# Patient Record
Sex: FEMALE | Race: WHITE | NOT HISPANIC OR LATINO | Employment: FULL TIME | URBAN - METROPOLITAN AREA
[De-identification: names, ages, dates, MRNs, and addresses within clinical notes are randomized per-mention and may not be internally consistent; named-entity substitution may affect disease eponyms.]

---

## 2019-10-06 ENCOUNTER — TRANSFERRED RECORDS (OUTPATIENT)
Dept: HEALTH INFORMATION MANAGEMENT | Facility: CLINIC | Age: 59
End: 2019-10-06

## 2019-10-25 ENCOUNTER — TRANSFERRED RECORDS (OUTPATIENT)
Dept: HEALTH INFORMATION MANAGEMENT | Facility: CLINIC | Age: 59
End: 2019-10-25

## 2019-11-02 ENCOUNTER — TRANSFERRED RECORDS (OUTPATIENT)
Dept: HEALTH INFORMATION MANAGEMENT | Facility: CLINIC | Age: 59
End: 2019-11-02

## 2019-11-07 ENCOUNTER — TRANSFERRED RECORDS (OUTPATIENT)
Dept: HEALTH INFORMATION MANAGEMENT | Facility: CLINIC | Age: 59
End: 2019-11-07

## 2019-11-22 ENCOUNTER — TRANSFERRED RECORDS (OUTPATIENT)
Dept: HEALTH INFORMATION MANAGEMENT | Facility: CLINIC | Age: 59
End: 2019-11-22

## 2019-12-09 ENCOUNTER — TRANSFERRED RECORDS (OUTPATIENT)
Dept: HEALTH INFORMATION MANAGEMENT | Facility: CLINIC | Age: 59
End: 2019-12-09

## 2020-02-03 ENCOUNTER — TRANSFERRED RECORDS (OUTPATIENT)
Dept: HEALTH INFORMATION MANAGEMENT | Facility: CLINIC | Age: 60
End: 2020-02-03

## 2020-06-12 ENCOUNTER — TRANSFERRED RECORDS (OUTPATIENT)
Dept: HEALTH INFORMATION MANAGEMENT | Facility: CLINIC | Age: 60
End: 2020-06-12

## 2020-07-21 ENCOUNTER — TRANSFERRED RECORDS (OUTPATIENT)
Dept: HEALTH INFORMATION MANAGEMENT | Facility: CLINIC | Age: 60
End: 2020-07-21

## 2020-07-23 ENCOUNTER — TRANSFERRED RECORDS (OUTPATIENT)
Dept: HEALTH INFORMATION MANAGEMENT | Facility: CLINIC | Age: 60
End: 2020-07-23

## 2021-02-23 ENCOUNTER — TRANSFERRED RECORDS (OUTPATIENT)
Dept: HEALTH INFORMATION MANAGEMENT | Facility: CLINIC | Age: 61
End: 2021-02-23

## 2021-03-11 ENCOUNTER — TRANSFERRED RECORDS (OUTPATIENT)
Dept: HEALTH INFORMATION MANAGEMENT | Facility: CLINIC | Age: 61
End: 2021-03-11

## 2021-04-01 ENCOUNTER — MEDICAL CORRESPONDENCE (OUTPATIENT)
Dept: HEALTH INFORMATION MANAGEMENT | Facility: CLINIC | Age: 61
End: 2021-04-01

## 2021-04-01 ENCOUNTER — TRANSFERRED RECORDS (OUTPATIENT)
Dept: HEALTH INFORMATION MANAGEMENT | Facility: CLINIC | Age: 61
End: 2021-04-01

## 2021-04-08 ENCOUNTER — TRANSCRIBE ORDERS (OUTPATIENT)
Dept: OTHER | Age: 61
End: 2021-04-08

## 2021-04-08 DIAGNOSIS — K51.011 ULCERATIVE PANCOLITIS WITH RECTAL BLEEDING (H): Primary | ICD-10-CM

## 2021-04-09 ENCOUNTER — DOCUMENTATION ONLY (OUTPATIENT)
Dept: SURGERY | Facility: CLINIC | Age: 61
End: 2021-04-09

## 2021-04-09 NOTE — PROGRESS NOTES
Luis Potter 4/9/21 10:10AM   Action Taken CSS faxed to [facility] to obtain recs   FAX: [phone]    Update: Received recs from [facility] - sent to urgent scanning

## 2021-04-23 NOTE — PROGRESS NOTES
Colon and Rectal Surgery Clinic Note    RE: Radha Jameson.  : 1960.  JU: 2021.    Reason for visit: Ulcerative colitis looking for second opinion regarding IPAA.    HPI:     Ulcerative colitis refractory to medical management followed by Dr. Jeff Silva at [facility].     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     Underwent total abdominal colectomy and end ileostomy on 10/17/2019    Completion proctectomy on 2020.  No ileal pouch anal anastomosis due to large rectal varices.        She postoperatively developed portal vein thrombosis and was on warfarin but currently is just on daily aspirin.    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 Furthermore, has very little past medical and surgical history apart from her kidney disease, DVTs and ulcerative colitis. She does not have any history of liver disease. No previous anorectal operations. G3, P1, C2.    Medical history:  No past medical history on file.    Surgical history:  No past surgical history on file.    Family history:  No family history on file.    Medications:  Current Outpatient Medications   Medication Sig Dispense Refill     aspirin (ASA) 325 MG tablet Take 325 mg by mouth daily       augmented betamethasone dipropionate (DIPROLENE) 0.05 % external lotion APPLY TO THE AFFECTED AREA OF SCALP TWICE DAILY UNTIL IMPROVED       busPIRone (BUSPAR) 5 MG tablet TAKE 1 TABLET BY MOUTH EVERY MORNING AND 2 TABLETS EVERY EVENING       carvedilol (COREG) 25 MG tablet TAKE 1 TABLET BY MOUTH TWICE DAILY WITH MEALS       dutasteride (AVODART) 0.5 MG capsule TAKE 2 CAPSULES BY MOUTH DAILY       enoxaparin ANTICOAGULANT (LOVENOX) 80 MG/0.8ML syringe        EPINEPHrine (ANY BX GENERIC EQUIV) 0.3 MG/0.3ML injection 2-pack INJECT 0.3 ML INTO THE MUSCLE ONE TIME PRF ALLERGIC REACTION       escitalopram (LEXAPRO) 20 MG tablet Take 20 mg by mouth every morning       finasteride  "(PROSCAR) 5 MG tablet TAKE 1 TABLET BY MOUTH DAILY OR AS DIRECTED       ketoconazole (NIZORAL) 2 % external shampoo APPLY TO AFFECTED AREAS ON SCALP ONCE EVERY OTHER WEEK AND THEN WASH OFF       magnesium citrate solution Take 296 mLs by mouth once for 1 dose Start at 8 pm night before surgery, unless otherwise stated in \"Getting Ready for Surgery\" instruction 296 mL 0     metroNIDAZOLE (FLAGYL) 500 MG tablet Take 1 tablet (500 mg) by mouth every 6 hours At 8:00 am, 2:00 pm, 8:00 pm the day prior to your surgery with neomycin and zofran. 3 tablet 0     neomycin (MYCIFRADIN) 500 MG tablet Take 2 tablets (1,000 mg) by mouth every 6 hours At 8:00 am, 2:00 pm, 8:00 pm the day prior to your surgery with flagyl and zofran. 6 tablet 0     nortriptyline (PAMELOR) 25 MG capsule TAKE 1 CAPSULE BY MOUTH AT BEDTIME       ondansetron (ZOFRAN) 4 MG tablet Take 1 tablet (4 mg) by mouth every 6 hours At 8:00 am, 2:00 pm, 8:00 pm the day prior to your surgery with neomycin and flagyl. 3 tablet 0     polyethylene glycol (MIRALAX) 17 g packet Take 238 g by mouth See Admin Instructions Starting at 4 pm night prior to surgery. Refer to \"Getting Ready for Surgery\" instructions. 14 packet 0       Allergies:  Allergies   Allergen Reactions     Warfarin Other (See Comments)     Internal bleeding reaction.     Wasp Venom Protein Angioedema and Other (See Comments)     Morphine Itching     Itching in throat per pt     Amlodipine Other (See Comments)       Social history:   Social History     Tobacco Use     Smoking status: Never Smoker     Smokeless tobacco: Never Used   Substance Use Topics     Alcohol use: Yes     Frequency: 2-4 times a month     Marital status: .    Physical Examination:  /84 (BP Location: Left arm, Patient Position: Sitting, Cuff Size: Adult Regular)   Pulse 57   Temp 97.6  F (36.4  C) (Oral)   Ht 5' 9\"   SpO2 100%   General: Well hydrated. No acute distress.  Abdomen: Soft, NT.  Right lower quadrant and " ileostomy viable and functioning.  No inguinal adenopathy palpated.  No abdominal varices.  Perianal external examination:  Perianal skin: intact.  Lesions: No.  Eversion of buttocks: There was not evidence of an anal fissure. Details: N/A.  Skin tags or external hemorrhoids: No.  Digital rectal examination: Was performed.   Sphincter tone: Good.  Palpable lesions: No.  It seems that the rectal cuff measures at least 4-5 cm from the anal verge.  Other: A small  rectocele was appreciated on bearing down.  Bimanual examination: was not performed.    Investigations:  None.    Procedures:  None.    ASSESSMENT  60-year-old female with ulcerative colitis refractory to medical therapy status post total abdominal proctocolectomy with a 2 cm rectal cuff left behind in case ileoanal anastomosis would be attempted in the future.  Unfortunately given the size of her pelvic varices IPAA was not attempted at that time.  We had a long discussion with regards to her comorbidities that would complicate IPAA as well as sequelae such as pouchitis, pouch varices with recurrent bleeding and severe life-threatening bleeding, pouch failure, chronic pain, and the need for further operations because of bowel obstructions, bowel ischemia or bleeding, internal hernia.  This would also put her at a higher risk for short gut syndrome with chronic TPN as well as further episodes of mesenteric ischemia if she develops more blood clots in her abdominal mesentery.  We also discussed that if I was not able to staple from below that we would have to perform a handsewn ileoanal anastomosis which has been associated with significantly worse functional outcomes as well as anastomotic stricture.  She is very frustrated with the fact that she has had an ileostomy for almost 2 years and is severely depressed and on max dose medications to treat this.  She also lived a very active lifestyle and would like to return at least partially to this.  All questions  were answered to her satisfaction.  Risks, benefits, and alternatives of operative treatment were thoroughly discussed with the patient, he/she understands these well and agrees to proceed.    PLAN  1.  Routine stoma cares.  2.  Schedule MR abdomen and pelvis without IV contrast to reassess abdominal pelvic varices.  3.  Schedule labs today to assess liver and kidney function as well as protein levels.  4.  We will need preoperative clearance by hematology and nephrology as well as PAC.  5.  Tentatively schedule robotic IPAA with diverting loop ileostomy and flexible sigmoidoscopy.  Will need PAC and labs.    60 minutes spent on the date of the encounter doing chart review, history and exam, documentation and further activities as noted above.      Uriel Hung M.D., M.Sc.     Division of Colon and Rectal Surgery  [facility]    Referring Provider:  Jeff Silva MD  [facility]  [address]  [address]  [place] [address]     Primary Care Provider:  Hair Rivera    CC:  Sage Palma DO Hunt, Jennifer L, MD

## 2021-04-26 ENCOUNTER — OFFICE VISIT (OUTPATIENT)
Dept: SURGERY | Facility: CLINIC | Age: 61
End: 2021-04-26
Attending: COLON & RECTAL SURGERY
Payer: COMMERCIAL

## 2021-04-26 VITALS
TEMPERATURE: 97.6 F | OXYGEN SATURATION: 100 % | HEART RATE: 57 BPM | DIASTOLIC BLOOD PRESSURE: 84 MMHG | HEIGHT: 69 IN | SYSTOLIC BLOOD PRESSURE: 131 MMHG

## 2021-04-26 DIAGNOSIS — K51.019 ULCERATIVE PANCOLITIS WITH COMPLICATION (H): ICD-10-CM

## 2021-04-26 DIAGNOSIS — K51.019 ULCERATIVE PANCOLITIS WITH COMPLICATION (H): Primary | ICD-10-CM

## 2021-04-26 LAB
ALBUMIN SERPL-MCNC: 3.7 G/DL (ref 3.4–5)
ALP SERPL-CCNC: 66 U/L (ref 40–150)
ALT SERPL W P-5'-P-CCNC: 43 U/L (ref 0–50)
ANION GAP SERPL CALCULATED.3IONS-SCNC: 6 MMOL/L (ref 3–14)
APTT PPP: 25 SEC (ref 22–37)
AST SERPL W P-5'-P-CCNC: 30 U/L (ref 0–45)
BASOPHILS # BLD AUTO: 0 10E9/L (ref 0–0.2)
BASOPHILS NFR BLD AUTO: 0.9 %
BILIRUB SERPL-MCNC: 0.7 MG/DL (ref 0.2–1.3)
BUN SERPL-MCNC: 31 MG/DL (ref 7–30)
CALCIUM SERPL-MCNC: 9.2 MG/DL (ref 8.5–10.1)
CHLORIDE SERPL-SCNC: 106 MMOL/L (ref 94–109)
CO2 SERPL-SCNC: 25 MMOL/L (ref 20–32)
CREAT SERPL-MCNC: 2.64 MG/DL (ref 0.52–1.04)
DIFFERENTIAL METHOD BLD: NORMAL
EOSINOPHIL # BLD AUTO: 0.2 10E9/L (ref 0–0.7)
EOSINOPHIL NFR BLD AUTO: 5.4 %
ERYTHROCYTE [DISTWIDTH] IN BLOOD BY AUTOMATED COUNT: 14.1 % (ref 10–15)
GFR SERPL CREATININE-BSD FRML MDRD: 19 ML/MIN/{1.73_M2}
GLUCOSE SERPL-MCNC: 70 MG/DL (ref 70–99)
HCT VFR BLD AUTO: 44 % (ref 35–47)
HGB BLD-MCNC: 13.9 G/DL (ref 11.7–15.7)
IMM GRANULOCYTES # BLD: 0 10E9/L (ref 0–0.4)
IMM GRANULOCYTES NFR BLD: 0.2 %
INR PPP: 1.21 (ref 0.86–1.14)
LYMPHOCYTES # BLD AUTO: 1.1 10E9/L (ref 0.8–5.3)
LYMPHOCYTES NFR BLD AUTO: 24.5 %
MCH RBC QN AUTO: 31.2 PG (ref 26.5–33)
MCHC RBC AUTO-ENTMCNC: 31.6 G/DL (ref 31.5–36.5)
MCV RBC AUTO: 99 FL (ref 78–100)
MONOCYTES # BLD AUTO: 0.5 10E9/L (ref 0–1.3)
MONOCYTES NFR BLD AUTO: 12.2 %
NEUTROPHILS # BLD AUTO: 2.5 10E9/L (ref 1.6–8.3)
NEUTROPHILS NFR BLD AUTO: 56.8 %
NRBC # BLD AUTO: 0 10*3/UL
NRBC BLD AUTO-RTO: 0 /100
PLATELET # BLD AUTO: 216 10E9/L (ref 150–450)
POTASSIUM SERPL-SCNC: 4.7 MMOL/L (ref 3.4–5.3)
PREALB SERPL IA-MCNC: 35 MG/DL (ref 15–45)
PROT SERPL-MCNC: 7.5 G/DL (ref 6.8–8.8)
RBC # BLD AUTO: 4.46 10E12/L (ref 3.8–5.2)
SODIUM SERPL-SCNC: 138 MMOL/L (ref 133–144)
WBC # BLD AUTO: 4.4 10E9/L (ref 4–11)

## 2021-04-26 PROCEDURE — 80053 COMPREHEN METABOLIC PANEL: CPT | Performed by: PATHOLOGY

## 2021-04-26 PROCEDURE — 85730 THROMBOPLASTIN TIME PARTIAL: CPT | Performed by: PATHOLOGY

## 2021-04-26 PROCEDURE — 85610 PROTHROMBIN TIME: CPT | Performed by: PATHOLOGY

## 2021-04-26 PROCEDURE — 85025 COMPLETE CBC W/AUTO DIFF WBC: CPT | Performed by: PATHOLOGY

## 2021-04-26 PROCEDURE — 99205 OFFICE O/P NEW HI 60 MIN: CPT | Performed by: COLON & RECTAL SURGERY

## 2021-04-26 PROCEDURE — 36415 COLL VENOUS BLD VENIPUNCTURE: CPT | Performed by: PATHOLOGY

## 2021-04-26 PROCEDURE — 84134 ASSAY OF PREALBUMIN: CPT | Performed by: PATHOLOGY

## 2021-04-26 RX ORDER — METRONIDAZOLE 500 MG/1
500 TABLET ORAL EVERY 6 HOURS
Qty: 3 TABLET | Refills: 0 | Status: SHIPPED | OUTPATIENT
Start: 2021-04-26 | End: 2021-04-26

## 2021-04-26 RX ORDER — KETOCONAZOLE 20 MG/ML
SHAMPOO TOPICAL DAILY PRN
COMMUNITY
Start: 2020-12-21

## 2021-04-26 RX ORDER — BETAMETHASONE DIPROPIONATE 0.5 MG/ML
LOTION, AUGMENTED TOPICAL 2 TIMES DAILY
COMMUNITY
Start: 2021-04-03

## 2021-04-26 RX ORDER — NEOMYCIN SULFATE 500 MG/1
1000 TABLET ORAL EVERY 6 HOURS
Qty: 6 TABLET | Refills: 0 | Status: SHIPPED | OUTPATIENT
Start: 2021-04-26 | End: 2021-04-26

## 2021-04-26 RX ORDER — CARVEDILOL 25 MG/1
12.5 TABLET ORAL 2 TIMES DAILY WITH MEALS
COMMUNITY
Start: 2021-02-27

## 2021-04-26 RX ORDER — ASPIRIN 325 MG
325 TABLET ORAL DAILY
COMMUNITY
Start: 2021-04-16 | End: 2021-06-01

## 2021-04-26 RX ORDER — ONDANSETRON 4 MG/1
4 TABLET, FILM COATED ORAL EVERY 6 HOURS
Qty: 3 TABLET | Refills: 0 | Status: SHIPPED | OUTPATIENT
Start: 2021-04-26 | End: 2021-04-26

## 2021-04-26 RX ORDER — FINASTERIDE 5 MG/1
5 TABLET, FILM COATED ORAL EVERY MORNING
COMMUNITY
Start: 2021-03-21 | End: 2022-06-06

## 2021-04-26 RX ORDER — ESCITALOPRAM OXALATE 20 MG/1
20 TABLET ORAL EVERY MORNING
COMMUNITY
Start: 2021-02-06

## 2021-04-26 RX ORDER — POLYETHYLENE GLYCOL 3350 17 G/17G
238 POWDER, FOR SOLUTION ORAL SEE ADMIN INSTRUCTIONS
Qty: 14 PACKET | Refills: 0 | Status: SHIPPED | OUTPATIENT
Start: 2021-04-26 | End: 2021-04-26

## 2021-04-26 RX ORDER — EPINEPHRINE 0.3 MG/.3ML
INJECTION SUBCUTANEOUS
COMMUNITY
Start: 2020-09-22

## 2021-04-26 RX ORDER — DUTASTERIDE 0.5 MG/1
0.5 CAPSULE, LIQUID FILLED ORAL 2 TIMES DAILY
COMMUNITY
Start: 2021-04-04

## 2021-04-26 RX ORDER — NORTRIPTYLINE HCL 25 MG
25 CAPSULE ORAL AT BEDTIME
COMMUNITY
Start: 2021-02-27

## 2021-04-26 RX ORDER — BUSPIRONE HYDROCHLORIDE 5 MG/1
10 TABLET ORAL 2 TIMES DAILY
COMMUNITY
Start: 2021-02-27

## 2021-04-26 SDOH — HEALTH STABILITY: MENTAL HEALTH: HOW OFTEN DO YOU HAVE 6 OR MORE DRINKS ON ONE OCCASION?: NOT ASKED

## 2021-04-26 SDOH — HEALTH STABILITY: MENTAL HEALTH: HOW MANY STANDARD DRINKS CONTAINING ALCOHOL DO YOU HAVE ON A TYPICAL DAY?: NOT ASKED

## 2021-04-26 SDOH — HEALTH STABILITY: MENTAL HEALTH: HOW OFTEN DO YOU HAVE A DRINK CONTAINING ALCOHOL?: 2-4 TIMES A MONTH

## 2021-04-26 ASSESSMENT — PAIN SCALES - GENERAL: PAINLEVEL: NO PAIN (0)

## 2021-04-26 NOTE — PATIENT INSTRUCTIONS
Follow up:    1. Pura will call you to schedule your surgery. If you do not hear from her within three business days please reach out to her.     2. Appointments you will need in prep:  -COVID test  -pre op physical   -blood work   -cleared by hematologist and nephrologist  -CT      3. You will need to do a full bowel prep with antibiotics the day before surgery. We will mail you the instructions.     4. Please switch to 81mg Aspirin     Please call with any questions or concerns regarding your clinic visit today.    It is a pleasure being involved in your health care.    Contacts post-consultation depending on your need:    Radiology Appointments [phone]    Schedule Clinic Appointments [phone] # 1   M-F 7:30 - 5 pm    MILY Foster [phone]    Clinic Fax Number [phone]    Surgery Scheduling [phone]    My Chart is available 24 hours a day and is a secure way to access your records and communicate with your care team.  I strongly recommend signing up if you haven't already done so, if you are comfortable with computers.  If you would like to inquire about this or are having problems with My Chart access, you may call [phone] or go online at [email or web address].  Please allow at least 24 hours for a response and extra time on weekends and Holidays.

## 2021-04-26 NOTE — NURSING NOTE
"Chief Complaint   Patient presents with     New Patient     New consult for ulcerative pancolitis with rectal bleeding       Vitals:    04/26/21 0937   BP: 131/84   BP Location: Left arm   Patient Position: Sitting   Cuff Size: Adult Regular   Pulse: 57   Temp: 97.6  F (36.4  C)   TempSrc: Oral   SpO2: 100%   Height: 5' 9\"       There is no height or weight on file to calculate BMI.         Delores Linares, [facility]    "

## 2021-04-26 NOTE — LETTER
2021       RE: Radha Jameson  [address]  [address]  [place] [address]     Dear Colleague,    Thank you for referring your patient, Radha Jameson, to the [facility] [place] at [facility]. Please see a copy of my visit note below.    Colon and Rectal Surgery Clinic Note    RE: Radha Jameson.  : 1960.  JU: 2021.    Reason for visit: Ulcerative colitis looking for second opinion regarding IPAA.    HPI:     Ulcerative colitis refractory to medical management followed by Dr. Jeff Silva at [facility].     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     Underwent total abdominal colectomy and end ileostomy on 10/17/2019    Completion proctectomy on 2020.  No ileal pouch anal anastomosis due to large rectal varices.        She postoperatively developed portal vein thrombosis and was on warfarin but currently is just on daily aspirin.    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 Furthermore, has very little past medical and surgical history apart from her kidney disease, DVTs and ulcerative colitis. She does not have any history of liver disease. No previous anorectal operations. G3, P1, C2.    Medical history:  No past medical history on file.    Surgical history:  No past surgical history on file.    Family history:  No family history on file.    Medications:  Current Outpatient Medications   Medication Sig Dispense Refill     aspirin (ASA) 325 MG tablet Take 325 mg by mouth daily       augmented betamethasone dipropionate (DIPROLENE) 0.05 % external lotion APPLY TO THE AFFECTED AREA OF SCALP TWICE DAILY UNTIL IMPROVED       busPIRone (BUSPAR) 5 MG tablet TAKE 1 TABLET BY MOUTH EVERY MORNING AND 2 TABLETS EVERY EVENING       carvedilol (COREG) 25 MG tablet TAKE 1 TABLET BY MOUTH TWICE DAILY WITH MEALS       dutasteride  "(AVODART) 0.5 MG capsule TAKE 2 CAPSULES BY MOUTH DAILY       enoxaparin ANTICOAGULANT (LOVENOX) 80 MG/0.8ML syringe        EPINEPHrine (ANY BX GENERIC EQUIV) 0.3 MG/0.3ML injection 2-pack INJECT 0.3 ML INTO THE MUSCLE ONE TIME PRF ALLERGIC REACTION       escitalopram (LEXAPRO) 20 MG tablet Take 20 mg by mouth every morning       finasteride (PROSCAR) 5 MG tablet TAKE 1 TABLET BY MOUTH DAILY OR AS DIRECTED       ketoconazole (NIZORAL) 2 % external shampoo APPLY TO AFFECTED AREAS ON SCALP ONCE EVERY OTHER WEEK AND THEN WASH OFF       magnesium citrate solution Take 296 mLs by mouth once for 1 dose Start at 8 pm night before surgery, unless otherwise stated in \"Getting Ready for Surgery\" instruction 296 mL 0     metroNIDAZOLE (FLAGYL) 500 MG tablet Take 1 tablet (500 mg) by mouth every 6 hours At 8:00 am, 2:00 pm, 8:00 pm the day prior to your surgery with neomycin and zofran. 3 tablet 0     neomycin (MYCIFRADIN) 500 MG tablet Take 2 tablets (1,000 mg) by mouth every 6 hours At 8:00 am, 2:00 pm, 8:00 pm the day prior to your surgery with flagyl and zofran. 6 tablet 0     nortriptyline (PAMELOR) 25 MG capsule TAKE 1 CAPSULE BY MOUTH AT BEDTIME       ondansetron (ZOFRAN) 4 MG tablet Take 1 tablet (4 mg) by mouth every 6 hours At 8:00 am, 2:00 pm, 8:00 pm the day prior to your surgery with neomycin and flagyl. 3 tablet 0     polyethylene glycol (MIRALAX) 17 g packet Take 238 g by mouth See Admin Instructions Starting at 4 pm night prior to surgery. Refer to \"Getting Ready for Surgery\" instructions. 14 packet 0       Allergies:  Allergies   Allergen Reactions     Warfarin Other (See Comments)     Internal bleeding reaction.     Wasp Venom Protein Angioedema and Other (See Comments)     Morphine Itching     Itching in throat per pt     Amlodipine Other (See Comments)       Social history:   Social History     Tobacco Use     Smoking status: Never Smoker     Smokeless tobacco: Never Used   Substance Use Topics     Alcohol " "use: Yes     Frequency: 2-4 times a month     Marital status: .    Physical Examination:  /84 (BP Location: Left arm, Patient Position: Sitting, Cuff Size: Adult Regular)   Pulse 57   Temp 97.6  F (36.4  C) (Oral)   Ht 5' 9\"   SpO2 100%   General: Well hydrated. No acute distress.  Abdomen: Soft, NT.  Right lower quadrant and ileostomy viable and functioning.  No inguinal adenopathy palpated.  No abdominal varices.  Perianal external examination:  Perianal skin: intact.  Lesions: No.  Eversion of buttocks: There was not evidence of an anal fissure. Details: N/A.  Skin tags or external hemorrhoids: No.  Digital rectal examination: Was performed.   Sphincter tone: Good.  Palpable lesions: No.  It seems that the rectal cuff measures at least 4-5 cm from the anal verge.  Other: A small  rectocele was appreciated on bearing down.  Bimanual examination: was not performed.    Investigations:  None.    Procedures:  None.    ASSESSMENT  60-year-old female with ulcerative colitis refractory to medical therapy status post total abdominal proctocolectomy with a 2 cm rectal cuff left behind in case ileoanal anastomosis would be attempted in the future.  Unfortunately given the size of her pelvic varices IPAA was not attempted at that time.  We had a long discussion with regards to her comorbidities that would complicate IPAA as well as sequelae such as pouchitis, pouch varices with recurrent bleeding and severe life-threatening bleeding, pouch failure, chronic pain, and the need for further operations because of bowel obstructions, bowel ischemia or bleeding, internal hernia.  This would also put her at a higher risk for short gut syndrome with chronic TPN as well as further episodes of mesenteric ischemia if she develops more blood clots in her abdominal mesentery.  We also discussed that if I was not able to staple from below that we would have to perform a handsewn ileoanal anastomosis which has been " associated with significantly worse functional outcomes as well as anastomotic stricture.  She is very frustrated with the fact that she has had an ileostomy for almost 2 years and is severely depressed and on max dose medications to treat this.  She also lived a very active lifestyle and would like to return at least partially to this.  All questions were answered to her satisfaction.  Risks, benefits, and alternatives of operative treatment were thoroughly discussed with the patient, he/she understands these well and agrees to proceed.    PLAN  1.  Routine stoma cares.  2.  Schedule MR abdomen and pelvis without IV contrast to reassess abdominal pelvic varices.  3.  Schedule labs today to assess liver and kidney function as well as protein levels.  4.  We will need preoperative clearance by hematology and nephrology as well as PAC.  5.  Tentatively schedule robotic IPAA with diverting loop ileostomy and flexible sigmoidoscopy.  Will need PAC and labs.    60 minutes spent on the date of the encounter doing chart review, history and exam, documentation and further activities as noted above.      Uriel Hung M.D., M.Sc.     Division of Colon and Rectal Surgery  [facility]    Referring Provider:  Jeff Silva MD  [facility]  [address]  [address]  [place] [address]     Primary Care Provider:  Hair Rivera    CC:  Sage Palma DO Hunt, Jennifer L, MD

## 2021-04-27 ENCOUNTER — PATIENT OUTREACH (OUTPATIENT)
Dept: SURGERY | Facility: CLINIC | Age: 61
End: 2021-04-27

## 2021-04-27 NOTE — PROGRESS NOTES
Pt left me a voicemail with questions following her appointment yesterday. I sent the below message to :       Hey,     Patient had a few follow up questions     1. Will a vascular surgeon be involved in the surgery or have you consulted one already. Per pt she thought you discussed her case with someone at [facility]?    2. What is the percentage of success and what is the percentage of complications in regards to her vascular issues? She gave me the example: if I were to get a blood clot and then she has vascular issues.     Thanks   Kristin

## 2021-04-27 NOTE — PROGRESS NOTES
I called pt in regards to her previous questions at let her know the below:    I told her that there will not be a vascular surgeon scheduled but we have a full vascular surgery team available if needed. Her vascular complication risk is high due to how rare her condition is, probably 30% per

## 2021-04-28 ENCOUNTER — TELEPHONE (OUTPATIENT)
Dept: SURGERY | Facility: CLINIC | Age: 61
End: 2021-04-28

## 2021-04-28 PROBLEM — K51.019 ULCERATIVE PANCOLITIS WITH COMPLICATION (H): Status: ACTIVE | Noted: 2021-04-28

## 2021-04-28 NOTE — TELEPHONE ENCOUNTER
Case Request received to schedule a Tier 2 Robotic surgery admit case with Dr. Hung at [facility]. Possible surgery date June 10th. Per Urology  Jodi, Dr. Dixon can do Cysto/Stents that date.    Communicated with MILY Foster and Dr. Hung via in basket message on 4/29/2021, and MILY Foster also followed up with Radiology to confirm best imaging for patient's needs. MRA orders entered.    Spoke with patient about scheduling her imaging, PAC, and Labs on the same day 1-2 weeks before surgery, she said to go ahead and schedule everything, then to call her to confirm.    Spoke with imaging scheduling, got everything scheduled at the [facility] on 6/2/2021 following PAC & Labs.    Spoke with patient again via phone, completed / confirmed all scheduling with patient via phone. MILY Foster will call patient regarding some medical questions. Surgery Packet then mailed to patient containing the following scheduling info confirmed with patient:    Your surgery is scheduled:    Date: Thursday Delilah 10, 2021  ________________________________    Time: 9:00 AM*  ________________________________    Please arrive at:  7:00 AM*  ______________________    Surgeon's Name:    Dr. Uriel Hung with Urology  - Urologist Dr. Joaquin Dixon for Cystoscopy and Uretal Stent Insertion      Pre-Op Physical Fax Numbers:         [facility] Pre-Admissions  [facility] Fax:  [phone] / Phone:  [phone]         Your surgery is located at:      [facility]      [facility] [address]C)      [place] [address]      [phone]      [email or web address]     *Times are tentative and may change. You can expect a call from the pre-admission nurses to confirm arrival and surgery start times if the times should change.      Required: Yes, you will need a  18 years or older to drive you home from your procedure as well as stay with you for 24  hours after your procedure, if you are discharged the same day as your procedure.    Surgery: Robotic ileoanal anastomosis, flexible sigmoidoscopy; cystoscopy, uretal stent insertion    Upcoming Appointments / Pre-surgical Requirements:   To ensure you are fully prepared for your surgery, please make sure the following items have been completed PRIOR to your surgery date:    Pre-operative Cardiology Clearance:  - clearance for surgery needed from your cardiologist at [facility]    Pre-operative Nephrology Clearance:  - clearance for surgery needed from your nephrologist at [facility]  Pre-operative History & Physical appointment:   Clinic appointment with Pre-operative Assessment Center (PAC): 6/2/2021 at 2:00 PM                                                 [address] [address]    Pre-operative Lab appointment:   Lab draw appointment:    6/2/2021 at 3:15 PM                                                 [address]                                                 [address] [place] [address]    Pre-operative MRA Abdomen WO:   Lab draw appointment:    6/2/2021 at 3:30 PM check-in                                                 [address]                                                 [address] [place] [address]  - back-to-back scans start at 4:00 [place] [address]     Pre-operative MRA Pelvis WO:   Lab draw appointment:    6/2/2021 at 3:30 PM check-in                                                 [address]                                                 [address] [place] [address]  - back-to-back scans start at 4:00 [place] [address]    Pre-operative COVID-19 Test:   Pre-procedure asymptomatic COVID PCR   6/8/2021 at 11:00 AM                                                 [address]                                                  [address] [place] [address]    Post operative appointment:  Clinic appointment with Simi Finch NP: 6/24/2021 at 10:00 AM                                                                      [facility] Floor(4K)                                                                      [address] [place] [address]    Post operative appointment:  Clinic appointment with Dr. Uriel Hung: 7/26/2021 at 11:00 AM                                                                      [address](4K)                                                                      [address] [place] [address]    Pre-surgical Bowel Preparation:  NONE

## 2021-04-29 ENCOUNTER — PATIENT OUTREACH (OUTPATIENT)
Dept: SURGERY | Facility: CLINIC | Age: 61
End: 2021-04-29

## 2021-04-29 DIAGNOSIS — I86.2 PELVIC VARICES: ICD-10-CM

## 2021-04-29 DIAGNOSIS — Q26.8 ABSENCE OF INFERIOR VENA CAVA: ICD-10-CM

## 2021-04-29 DIAGNOSIS — K51.019 ULCERATIVE PANCOLITIS WITH COMPLICATION (H): Primary | ICD-10-CM

## 2021-04-29 DIAGNOSIS — I86.8 INTRA-ABDOMINAL VARICES: ICD-10-CM

## 2021-04-30 NOTE — TELEPHONE ENCOUNTER
FUTURE VISIT INFORMATION      SURGERY INFORMATION:    Date: 6/10/21    Location: [facility] OR    Surgeon:  Joaquin Dixon MD Gaertner, Wolfgang Bernd, MD    Anesthesia Type:  General    Procedure: INSERTION, STENT, URETER, PREOPERATIVE ROBOT-ASSISTED  ileal pouch-anal anastomosis, diverting loop ileostomy SIGMOIDOSCOPY, FLEXIBLE    Consult: ov     RECORDS REQUESTED FROM:       Primary Care Provider: Hair Rivera MBBS  - [facility]    Most recent EKG+ Tracin20- [facility]    Most recent ECHO: 2009- [facility]    Most recent Sleep Study: 16- [facility]

## 2021-04-30 NOTE — PROGRESS NOTES
I answered all of Radha's questions to her stated satisfaction. I told her we need clear documentation for clearance from her nephrologist Dr. Blank Germain and her cardiologist Dr. Sage Palma. I told her she will need to set up an office visit. We are able to see their documentation in [facility].

## 2021-05-16 DIAGNOSIS — Z11.59 ENCOUNTER FOR SCREENING FOR OTHER VIRAL DISEASES: Primary | ICD-10-CM

## 2021-05-24 ENCOUNTER — TRANSFERRED RECORDS (OUTPATIENT)
Dept: HEALTH INFORMATION MANAGEMENT | Facility: CLINIC | Age: 61
End: 2021-05-24

## 2021-05-27 ENCOUNTER — TEAM CONFERENCE (OUTPATIENT)
Dept: SURGERY | Facility: CLINIC | Age: 61
End: 2021-05-27

## 2021-05-27 NOTE — PROGRESS NOTES
COLON AND RECTAL SURGERY HUDDLE:    Patient was reviewed in preporation for their surgery the following was reviewed and has been completed:    Surgeon: Dr. Uriel Hung    Surgery & Date: 6/10/2021 robotic ileal pouch anal anastomosis DLI and flex sig     Last MD Note: reviewed    Anesthesia Type: General    Other Providers: Yes Dr. Dixon     PAC: Yes    WOC: N/A    Labs: Yes    Bowel Prep: No Clear Liquid    Packet: Yes    Imaging: Yes 6/2/2021 MR A/P    Post-Op Appointments: Yes  - Dr. Germain: cleared per telephone encounter on 4/28/2021 in [facility] and will have PAC visit on 6/2/2021  -Hematologist: per patient she said that she is cleared by her hematologist but there is no documentation. I asked the patient to have her hematologist either document something in epic or fax documentation over.  -Addendum: I read through the heme note they are going to have Radha draw labs on 6/4 then review them with her on 6/8. I told her she needs to either have that note physically with her or it needs to get faxed to us asap since that visit is 2 days before surgery      COVID: Yes

## 2021-05-28 ENCOUNTER — PATIENT OUTREACH (OUTPATIENT)
Dept: SURGERY | Facility: CLINIC | Age: 61
End: 2021-05-28

## 2021-06-01 RX ORDER — ASPIRIN 81 MG/1
81 TABLET ORAL DAILY
COMMUNITY
End: 2021-07-23

## 2021-06-01 NOTE — PROGRESS NOTES
Preoperative Assessment Center Medication History Note    Medication history completed on June 1, 2021 by this writer. See Epic admission navigator for prior to admission medications. Operating room staff will still need to confirm medications and last dose information on day of surgery.     Medication history interview sources:  patient, payor information,     Changes made to PTA medication list (reason)  Added: biotin, vitamin D.   Deleted: enoxaparin (not on this any longer, likely will be on this postoperatively for a prolonged prophylactic course -s ee notes from [facility] from appointment on 6/8).   Changed: buspar dose/sig,  Dutasteride dose/sig, aspirin dose.     Additional medication history information (including reliability of information, actions taken by pharmacist):    -- No recent (within 30 days) course of antibiotics  -- No recent (within 30 days) course of systemic steroids  -- Patient declines being on any other prescription or over-the-counter medications      Prior to Admission medications    Medication Sig Last Dose Taking? Auth Provider   aspirin 81 MG EC tablet Take 81 mg by mouth daily Taking Yes Unknown, Entered By History   augmented betamethasone dipropionate (DIPROLENE) 0.05 % external lotion APPLY TO THE AFFECTED AREA OF SCALP TWICE DAILY UNTIL IMPROVED Taking Yes Reported, Patient   BIOTIN PO Take 1 tablet by mouth daily Taking Yes Unknown, Entered By History   busPIRone (BUSPAR) 5 MG tablet Take 5 mg in the morning  Take 5 mg in the afternoon and   Take 10 mg (2 tablets) by mouth at bedtime. Taking Yes Reported, Patient   carvedilol (COREG) 25 MG tablet Take 25 mg by mouth 2 times daily (with meals)  Taking Yes Reported, Patient   cholecalciferol 25 MCG (1000 UT) TABS Take 1,000 Units by mouth daily Taking Yes Unknown, Entered By History   dutasteride (AVODART) 0.5 MG capsule Take 0.5 mg by mouth 2 times daily  Taking Yes Reported, Patient   EPINEPHrine (ANY BX GENERIC EQUIV) 0.3  MG/0.3ML injection 2-pack INJECT 0.3 ML INTO THE MUSCLE ONE TIME PRF ALLERGIC REACTION Taking Yes Reported, Patient   escitalopram (LEXAPRO) 20 MG tablet Take 20 mg by mouth every morning Taking Yes Reported, Patient   finasteride (PROSCAR) 5 MG tablet Take 5 mg by mouth daily  Taking Yes Reported, Patient   ketoconazole (NIZORAL) 2 % external shampoo APPLY TO AFFECTED AREAS ON SCALP ONCE EVERY OTHER WEEK AND THEN WASH OFF Taking Yes Reported, Patient   nortriptyline (PAMELOR) 25 MG capsule Take 25 mg by mouth At Bedtime  Taking Yes Reported, Patient          Medication history completed by: Nam Limon [facility]

## 2021-06-02 ENCOUNTER — ANCILLARY PROCEDURE (OUTPATIENT)
Dept: MRI IMAGING | Facility: CLINIC | Age: 61
End: 2021-06-02
Attending: COLON & RECTAL SURGERY
Payer: COMMERCIAL

## 2021-06-02 ENCOUNTER — ANESTHESIA EVENT (OUTPATIENT)
Dept: SURGERY | Facility: CLINIC | Age: 61
DRG: 330 | End: 2021-06-02
Payer: COMMERCIAL

## 2021-06-02 ENCOUNTER — PRE VISIT (OUTPATIENT)
Dept: SURGERY | Facility: CLINIC | Age: 61
End: 2021-06-02

## 2021-06-02 ENCOUNTER — OFFICE VISIT (OUTPATIENT)
Dept: SURGERY | Facility: CLINIC | Age: 61
End: 2021-06-02
Payer: COMMERCIAL

## 2021-06-02 ENCOUNTER — PATIENT OUTREACH (OUTPATIENT)
Dept: UROLOGY | Facility: CLINIC | Age: 61
End: 2021-06-02

## 2021-06-02 VITALS
HEART RATE: 66 BPM | SYSTOLIC BLOOD PRESSURE: 154 MMHG | DIASTOLIC BLOOD PRESSURE: 110 MMHG | OXYGEN SATURATION: 95 % | WEIGHT: 170 LBS | TEMPERATURE: 98.1 F | BODY MASS INDEX: 25.18 KG/M2 | RESPIRATION RATE: 16 BRPM | HEIGHT: 69 IN

## 2021-06-02 DIAGNOSIS — Q26.8 ABSENCE OF INFERIOR VENA CAVA: ICD-10-CM

## 2021-06-02 DIAGNOSIS — I86.8 INTRA-ABDOMINAL VARICES: ICD-10-CM

## 2021-06-02 DIAGNOSIS — K51.019 ULCERATIVE PANCOLITIS WITH COMPLICATION (H): Primary | ICD-10-CM

## 2021-06-02 DIAGNOSIS — I86.2 PELVIC VARICES: ICD-10-CM

## 2021-06-02 DIAGNOSIS — K51.019 ULCERATIVE PANCOLITIS WITH COMPLICATION (H): ICD-10-CM

## 2021-06-02 DIAGNOSIS — Z01.812 PRE-PROCEDURE LAB EXAM: Primary | ICD-10-CM

## 2021-06-02 DIAGNOSIS — Z01.818 PREOP EXAMINATION: Primary | ICD-10-CM

## 2021-06-02 DIAGNOSIS — Z01.812 PRE-PROCEDURE LAB EXAM: ICD-10-CM

## 2021-06-02 LAB
ALBUMIN SERPL-MCNC: 3.9 G/DL (ref 3.4–5)
ALBUMIN UR-MCNC: 100 MG/DL
ALP SERPL-CCNC: 59 U/L (ref 40–150)
ALT SERPL W P-5'-P-CCNC: 41 U/L (ref 0–50)
ANION GAP SERPL CALCULATED.3IONS-SCNC: 6 MMOL/L (ref 3–14)
APPEARANCE UR: CLEAR
APTT PPP: 26 SEC (ref 22–37)
AST SERPL W P-5'-P-CCNC: 22 U/L (ref 0–45)
BASOPHILS # BLD AUTO: 0.1 10E9/L (ref 0–0.2)
BASOPHILS NFR BLD AUTO: 0.9 %
BILIRUB SERPL-MCNC: 0.8 MG/DL (ref 0.2–1.3)
BILIRUB UR QL STRIP: NEGATIVE
BUN SERPL-MCNC: 29 MG/DL (ref 7–30)
CALCIUM SERPL-MCNC: 9.5 MG/DL (ref 8.5–10.1)
CHLORIDE SERPL-SCNC: 111 MMOL/L (ref 94–109)
CO2 SERPL-SCNC: 24 MMOL/L (ref 20–32)
COLOR UR AUTO: YELLOW
CREAT SERPL-MCNC: 2.38 MG/DL (ref 0.52–1.04)
DIFFERENTIAL METHOD BLD: NORMAL
EOSINOPHIL # BLD AUTO: 0.2 10E9/L (ref 0–0.7)
EOSINOPHIL NFR BLD AUTO: 3.7 %
ERYTHROCYTE [DISTWIDTH] IN BLOOD BY AUTOMATED COUNT: 14.4 % (ref 10–15)
GFR SERPL CREATININE-BSD FRML MDRD: 21 ML/MIN/{1.73_M2}
GLUCOSE SERPL-MCNC: 79 MG/DL (ref 70–99)
GLUCOSE UR STRIP-MCNC: NEGATIVE MG/DL
HCT VFR BLD AUTO: 44.6 % (ref 35–47)
HGB BLD-MCNC: 14.4 G/DL (ref 11.7–15.7)
HGB UR QL STRIP: ABNORMAL
IMM GRANULOCYTES # BLD: 0 10E9/L (ref 0–0.4)
IMM GRANULOCYTES NFR BLD: 0.5 %
INR PPP: 1.02 (ref 0.86–1.14)
KETONES UR STRIP-MCNC: NEGATIVE MG/DL
LEUKOCYTE ESTERASE UR QL STRIP: NEGATIVE
LYMPHOCYTES # BLD AUTO: 1.5 10E9/L (ref 0.8–5.3)
LYMPHOCYTES NFR BLD AUTO: 26.2 %
MCH RBC QN AUTO: 30.9 PG (ref 26.5–33)
MCHC RBC AUTO-ENTMCNC: 32.3 G/DL (ref 31.5–36.5)
MCV RBC AUTO: 96 FL (ref 78–100)
MONOCYTES # BLD AUTO: 0.6 10E9/L (ref 0–1.3)
MONOCYTES NFR BLD AUTO: 9.9 %
MUCOUS THREADS #/AREA URNS LPF: PRESENT /LPF
NEUTROPHILS # BLD AUTO: 3.5 10E9/L (ref 1.6–8.3)
NEUTROPHILS NFR BLD AUTO: 58.8 %
NITRATE UR QL: NEGATIVE
NRBC # BLD AUTO: 0 10*3/UL
NRBC BLD AUTO-RTO: 0 /100
PH UR STRIP: 5 PH (ref 5–7)
PLATELET # BLD AUTO: 238 10E9/L (ref 150–450)
POTASSIUM SERPL-SCNC: 4.4 MMOL/L (ref 3.4–5.3)
PREALB SERPL IA-MCNC: 34 MG/DL (ref 15–45)
PROT SERPL-MCNC: 7.6 G/DL (ref 6.8–8.8)
RBC # BLD AUTO: 4.66 10E12/L (ref 3.8–5.2)
RBC #/AREA URNS AUTO: 2 /HPF (ref 0–2)
SODIUM SERPL-SCNC: 141 MMOL/L (ref 133–144)
SOURCE: ABNORMAL
SP GR UR STRIP: 1.02 (ref 1–1.03)
SQUAMOUS #/AREA URNS AUTO: <1 /HPF (ref 0–1)
UROBILINOGEN UR STRIP-MCNC: 0 MG/DL (ref 0–2)
WBC # BLD AUTO: 5.9 10E9/L (ref 4–11)
WBC #/AREA URNS AUTO: 1 /HPF (ref 0–5)

## 2021-06-02 PROCEDURE — 81001 URINALYSIS AUTO W/SCOPE: CPT | Performed by: PATHOLOGY

## 2021-06-02 PROCEDURE — 36415 COLL VENOUS BLD VENIPUNCTURE: CPT | Performed by: PATHOLOGY

## 2021-06-02 PROCEDURE — 84134 ASSAY OF PREALBUMIN: CPT | Performed by: PATHOLOGY

## 2021-06-02 PROCEDURE — 87086 URINE CULTURE/COLONY COUNT: CPT | Mod: 90 | Performed by: PATHOLOGY

## 2021-06-02 PROCEDURE — 86901 BLOOD TYPING SEROLOGIC RH(D): CPT | Performed by: PATHOLOGY

## 2021-06-02 PROCEDURE — 85610 PROTHROMBIN TIME: CPT | Performed by: PATHOLOGY

## 2021-06-02 PROCEDURE — 99000 SPECIMEN HANDLING OFFICE-LAB: CPT | Performed by: PATHOLOGY

## 2021-06-02 PROCEDURE — 85730 THROMBOPLASTIN TIME PARTIAL: CPT | Performed by: PATHOLOGY

## 2021-06-02 PROCEDURE — 86900 BLOOD TYPING SEROLOGIC ABO: CPT | Performed by: PATHOLOGY

## 2021-06-02 PROCEDURE — 85025 COMPLETE CBC W/AUTO DIFF WBC: CPT | Performed by: PATHOLOGY

## 2021-06-02 PROCEDURE — 72198 MR ANGIO PELVIS W/O & W/DYE: CPT | Mod: GC | Performed by: RADIOLOGY

## 2021-06-02 PROCEDURE — 86850 RBC ANTIBODY SCREEN: CPT | Performed by: PATHOLOGY

## 2021-06-02 PROCEDURE — 99205 OFFICE O/P NEW HI 60 MIN: CPT | Performed by: CLINICAL NURSE SPECIALIST

## 2021-06-02 PROCEDURE — 80053 COMPREHEN METABOLIC PANEL: CPT | Performed by: PATHOLOGY

## 2021-06-02 PROCEDURE — 72195 MRI PELVIS W/O DYE: CPT | Mod: GC | Performed by: RADIOLOGY

## 2021-06-02 PROCEDURE — 74185 MRA ABD W OR W/O CNTRST: CPT | Mod: GC | Performed by: RADIOLOGY

## 2021-06-02 ASSESSMENT — MIFFLIN-ST. JEOR: SCORE: 1405.49

## 2021-06-02 ASSESSMENT — LIFESTYLE VARIABLES: TOBACCO_USE: 0

## 2021-06-02 ASSESSMENT — PAIN SCALES - GENERAL: PAINLEVEL: NO PAIN (0)

## 2021-06-02 NOTE — PATIENT INSTRUCTIONS
Preparing for Your Surgery      Name:  Radha Jameson   MRN:  [id number]   :  1960   Today's Date:  2021       Arriving for surgery:  Surgery date:  6/10/21  Arrival time:  9:45AM    Restrictions due to COVID 19:  One consistent visitor per patient is allowed.  The visitor will be allowed in the pre-op area.  Visitors are asked to leave the building during the surgery.  No ill visitors.  All visitors must wear face mask.    [employer] parking is available for anyone with mobility limitations or disabilities.  ([employer]  24 hours/ 7 days a week; [employer]  7 am- 3:30 pm, Mon- Fri)    Please come to:     [facility] [address]  [address] [place] [address]    When entering the hospital you will be asked COVID screening questions, you will then be directed to Security and registration.  Registration will direct you to the correct lobby to wait until escorted to the preop area. Preop number- [phone]    What can I eat or drink?  -  You may eat and drink normally for up to 8 hours before your surgery. (Until 6/10/21, 4:15AM)  -  You may have clear liquids until 2 hours before surgery. (Until 6/10/21, 9:45AM)    Examples of clear liquids:  Water  Clear broth  Juices (apple, white grape, white cranberry  and cider) without pulp  Noncarbonated, powder based beverages  (lemonade and Stevo-Aid)  Sodas (Sprite, 7-Up, ginger ale and seltzer)  Coffee or tea (without milk or cream)  Gatorade    -  No Alcohol for at least 24 hours before surgery     Which medicines can I take?    Hold Aspirin for 5 days before surgery.   Hold Multivitamins for 7 days before surgery.  Hold Supplements for 7 days before surgery.  Hold Ibuprofen (Advil, Motrin) for 1 day before surgery--unless otherwise directed by surgeon.  Hold Naproxen (Aleve) for 4 days before surgery.    -  DO NOT take these medications the day of surgery:    Biotin   Vitamin D    -  PLEASE TAKE these  medications the day of surgery:    Buspirone(Buspar)   Carvedilol(Coreg)    Dutasteride(Avodart)   Escitalopram(Lexapro)    Finasteride(Proscar)    How do I prepare myself?  - Please take 2 showers before surgery using Scrubcare or Hibiclens soap.    Use this soap only from the neck to your toes.     Leave the soap on your skin for one minute--then rinse thoroughly.      You may use your own shampoo and conditioner; no other hair products.   - Please remove all jewelry and body piercings.  - No lotions, deodorants or fragrance.  - No makeup or fingernail polish.   - Bring your ID and insurance card.    - All patients are required to have a Covid-19 test within 4 days of surgery/procedure.      -Patients will be contacted by the [facility] scheduling team within 1 week of surgery to make an appointment.      - Patients may call the Scheduling team at [phone] if they have not been scheduled within 4 days of  surgery.        Questions or Concerns:    - For any questions regarding the day of surgery or your hospital stay, please contact the Pre Admission Nursing Office at [phone].       - If you have health changes between today and your surgery please call your surgeon.       For questions after surgery please call your surgeons office.             OPTIMAL RECOVERY       Begin hydrating yourself by drinking at least 8-10 glasses of clear liquids for 24 hours before surgery:      Suggested clear liquids:   Water    Clear Juices   Clear Broth   Non- carbonated beverages    (Crystal Light or Stevo Aid)   Sodas    (Sprite, 7 up, ginger ale, seltzer)   Gatorade              Drink clear liquids up until 2 hours before your surgery.       We would like you to purchase a drink such as Gatorade or Ensure Clear (not the milkshake type).  Drink this before bedtime and on the way into the hospital, drink between 8-10 ounces or until you feel hydrated.        Keeping well hydrated leads to your veins being plump,  you wake up faster, and you are less likely to be nauseated. Start drinking water as soon as you can after surgery and advance to clear liquids and food as tolerated.  IV fluids contain salt, drinking fluids will minimize the amount of IV fluids you need and decrease the amount of salt you get.                 The most common reason for the patient to be readmitted is dehydration. Staying hydrated after you go home from the hospital is very important.  Ensure or Ensure Clear are good options to keep you hydrated.      Using an Incentive Spirometer    An incentive spirometer is a device that helps you do deep breathing exercises. These exercises expand your lungs, aid in circulation, and help prevent pneumonia. Deep breathing exercises also help you breathe better and improve the function of your lungs by:    Keeping your lungs clear    Strengthening your breathing muscles    Helping prevent respiratory complications or problems  The incentive spirometer gives you a way to take an active part in your care. A nurse or therapist will teach you breathing exercises. To do these exercises, you will breathe in through your mouth and not your nose. The incentive spirometer only works correctly if you breathe in through your mouth.    Steps to clear lungs  Step 1. Exhale normally. Then, inhale normally.    Relax and breathe out.  Step 2. Place your lips tightly around the mouthpiece.    Make sure the device is upright and not tilted.  Step 3. Inhale as much air as you can through the mouthpiece (don't breath through your nose).    Inhale slowly and deeply.    Hold your breath long enough to keep the balls or disk raised for at least 3 to 5 seconds, or as instructed by your healthcare provider.  Step 4. Repeat the exercise regularly.  Begin using the Incentive Spirometer one week prior to your surgery, 4 times per day-5 breaths each.

## 2021-06-02 NOTE — ANESTHESIA PREPROCEDURE EVALUATION
Anesthesia Pre-Procedure Evaluation    Patient: Radha Jameson   MRN: [id number] : 1960        Preoperative Diagnosis: Ulcerative pancolitis with complication (H) [K51.019]   Procedure : Procedure(s):  INSERTION, STENT, URETER, PREOPERATIVE  ROBOT-ASSISTED  ileal pouch-anal anastomosis, diverting loop ileostomy  SIGMOIDOSCOPY, FLEXIBLE     Past Medical History:   Diagnosis Date     Absence of inferior vena cava      Acute kidney injury (H)      Matson's esophagus      Chronic kidney disease      Chronic neck pain      DVT (deep venous thrombosis) (H)      Esophageal reflux      Fibromyalgia      HTN (hypertension)      PCOS (polycystic ovarian syndrome)      PONV (postoperative nausea and vomiting)      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     Wasp Venom Protein Angioedema and Other (See Comments)     Morphine Itching     Itching in throat per pt     Amlodipine Other (See Comments)     Edema        Social History     Tobacco Use     Smoking status: Never Smoker     Smokeless tobacco: Never Used   Substance Use Topics     Alcohol use: Yes     Frequency: 2-4 times a month      Wt Readings from Last 1 Encounters:   No data found for Wt        Anesthesia Evaluation   Pt has had prior anesthetic. Type: General and MAC.    History of anesthetic complications  - PONV.  Two episodes of hypotension during  surgery.    ROS/MED HX  ENT/Pulmonary:     (+) NICKI risk factors, snores loudly, hypertension,  (-) tobacco use   Neurologic:  - neg neurologic ROS     Cardiovascular:     (+) hypertension-range: Today 140-150s/100s/ ----Taking blood thinners Pt has received instructions: Instructions Given to patient: Will hold ASA for 5 days prior to surgery. Previous cardiac testing   Echo: Date: Results:    Stress Test: Date: Results:    ECG Reviewed: Date: 7/26/20 Results:  SB  Cath: Date: Results:      METS/Exercise Tolerance: >4 METS    Hematologic:     (+) History of blood clots, pt is not anticoagulated, anemia, history of blood transfusion, no previous transfusion reaction,     Musculoskeletal:  - neg musculoskeletal ROS     GI/Hepatic: Comment: History of Matson's  Denies GERD symptoms    (+) GERD, Other, Inflammatory bowel disease,     Renal/Genitourinary:     (+) renal disease, type: CRI, Pt does not require dialysis,     Endo:     (+) thyroid problem,  Thyroid disease - Other,     Psychiatric/Substance Use:     (+) psychiatric history anxiety and depression     Infectious Disease:  - neg infectious disease ROS     Malignancy:  - neg malignancy ROS     Other:  - neg other ROS          Physical Exam    Airway        Mallampati: II   TM distance: > 3 FB   Neck ROM: full   Mouth opening: > 3 cm    Respiratory Devices and Support         Dental  no notable dental history         Cardiovascular          Rhythm and rate: regular and normal     Pulmonary           breath sounds clear to auscultation           OUTSIDE LABS:  CBC:   Lab Results   Component Value Date    WBC 4.4 04/26/2021    HGB 13.9 04/26/2021    HCT 44.0 04/26/2021     04/26/2021     BMP:   Lab Results   Component Value Date     04/26/2021    POTASSIUM 4.7 04/26/2021    CHLORIDE 106 04/26/2021    CO2 25 04/26/2021    BUN 31 (H) 04/26/2021    CR 2.64 (H) 04/26/2021    GLC 70 04/26/2021     COAGS:   Lab Results   Component Value Date    PTT 25  04/26/2021    INR 1.21 (H) 04/26/2021     POC: No results found for: BGM, HCG, HCGS  HEPATIC:   Lab Results   Component Value Date    ALBUMIN 3.7 04/26/2021    PROTTOTAL 7.5 04/26/2021    ALT 43 04/26/2021    AST 30 04/26/2021    ALKPHOS 66 04/26/2021    BILITOTAL 0.7 04/26/2021     OTHER:   Lab Results   Component Value Date    STEPHANIE 9.2 04/26/2021       Anesthesia Plan    ASA Status:  3      Anesthesia Type: General.     - Airway: ETT   Induction: Intravenous.   Maintenance: Inhalation.   Techniques and Equipment:     - Lines/Monitors: 2nd IV     Consents    Anesthesia Plan(s) and associated risks, benefits, and realistic alternatives discussed. Questions answered and patient/representative(s) expressed understanding.     - Discussed with:  Patient         Postoperative Care    Pain management: Multi-modal analgesia.   PONV prophylaxis: Dexamethasone or Solumedrol     Comments:    H/o PONV x1 for several hours after a surgery years ago. Has had inhaled GA within past 2 years with decadron/zofran and not had PONV. Will admin decadron intraop, received granisetron preop; droperidol ordered for PACU prn breakthrough nausea.          PAC Discussion and Assessment    ASA Classification: 3  Case is suitable for: [place]  Anesthetic techniques and relevant risks discussed: GA and GA with regional block for post-op pain control  Invasive monitoring and risk discussed: No    Possibility and Risk of blood transfusion discussed: Yes            PAC Resident/NP Anesthesia Assessment: Radha Jameson is a 60 year old female scheduled to undergo INSERTION, STENT, URETER, PREOPERATIVE, ROBOT-ASSISTED  ileal pouch-anal anastomosis, diverting loop ileostomy, SIGMOIDOSCOPY, FLEXIBLE with Ezequiel Hung and Destiny on 6/10/21. She has the following specific operative considerations:   - RCRI : 6.6% risk of major adverse cardiac event.   - VTE risk: 1.8%  - NICKI # of risks 3/8 = Intermediate risk  - Risk of PONV score = 4.  If > 2,  anti-emetic intervention recommended. If 3 or > anti emetic intervention recommended with two or more meds    --Ulcerative colitis, refractory to medical management. S/p multiple surgeries, currently with Ileostomy. Above procedure now planned to complete  ileal pouch.   --PONV. Significant history. Final decisions regarding prophylaxis by Anesthesia on DOS. History of hypotension during two surgeries. Possibly attributed to acute illness with sepsis.   --HYPERTENSION. Presenting /101, attributed to anxiety but recheck 154/110. Patient was instructed to contact her PCP or physician following her BP and discuss management prior to surgery. She agreed. Will take Coreg on DOS. No other cardiac history, symptoms or meds. EKG above SB. Patient is a competitive weight  and is physically fit and active.   --Nonsmoker. Denies pulmonary symptoms. Intermediate risk for NICKI.  --Denies GERD at this time. No meds.  --History of multiple DVTs and thrombosis. Difficulty tolerating warfarin due to labile INR and bleeding episode. Currently on aspirin 81 mg and will hold for 5 days prior to surgery. Follows with outside Hematology with plan for postoperative anticoagulation documented in H and P.  --Anemia. Last Hgb 14.4. Past history of blood transfusion. Type and screen drawn today.   --CKD. Cr today 2.38. Followed by OSH Nephrology. Will require careful monitoring during periop period.  --History of thyrotoxicosis in the past attributed to an herbal supplement.   --Depression. Will take Lexapro on DOS. Buspar for anxiety and will take on DOS. Nortriptyline for sleep at HS.  --Pain management. Discussed possibility of nerve block if appropriate for patient's procedure. Final counseling and decisions by regional team on DOS.  --Proscar and Avodart for hair growth.  --Difficult IV stick with US sometimes required.   --Optimal recovery pathway.      Patient was discussed with Dr Lomas. Patient will proceed.        Reviewed and Signed by PAC Mid-Level Provider/Resident  Mid-Level Provider/Resident: LEVI Roman CNS  Date: 6/2/21  Time: 3:00pm                               LEVI Little CNS

## 2021-06-02 NOTE — H&P
"  Pre-Operative H & P     CC:  Preoperative exam to assess for increased cardiopulmonary risk while undergoing surgery and anesthesia.    Date of Encounter: 6/2/2021  Primary Care Physician:  Hair Rivera  Reason for visit: Ulcerative pancolitis with complication (H) [K51.019]    HPI  Radha Jameson is a 60 year old female who presents for pre-operative H & P in preparation for INSERTION, STENT, URETER, PREOPERATIVE, ROBOT-ASSISTED  ileal pouch-anal anastomosis, diverting loop ileostomy, SIGMOIDOSCOPY, FLEXIBLE with Ezequiel Hung and Destiny on 6/10/21 at [facility]. History is obtained from the patient and medical records.    Patient with complex medical history to include ulcerative colitis, refractory to medical management, s/p total abdominal colectomy in 2019. She returned to the OR in 7/2020 for completion proctectomy and pouch formation. The pouch, however, could not be formed due to rectal varices in significant pelvic venous collaterals related to her history of congenitally absent IVC. She more recently consulted with Dr. Hung who counseled her for above procedure.     Her history also includes multiple DVTs, portal vein thrombosis and SMV thrombosis. A work up in 2012 revealed lupus anticoagulant. She has been on anticoagulation in the past but has had difficulty tolerating warfarin due to labile INR and bleeding episode. She has been taking aspirin prophylaxis for many years. She has been recently followed by Hematology through [facility]. She was last seen on 5/24/21 in consideration for a perioperative plan for above procedure. Per notes from that visit:     \"Due to high risk of recurrent abdominal VTE, I recommend a prolonged post operative course of LMWH vs heparain with bridging to coumadin based on kidney function for an additional six weeks.\"     The patient is also treated for essential HYPERTENSION, Matson's esophagus, s/p Fontan " "gastric fundoplication, normocytic anemia, chronic low back pain, PCOS, and CKD, stage IV. For her kidney disease she is followed by Dr. Germain at [facility] who provided the following recommendation:    \"Colorectal at the [facility] wanted nephrology clearance for surgery.   I have reviewed her most recent vitals and labs from [facility]:     While her last [facility] creatinine is a bit higher than her baseline, her blood pressure, electrolytes, and hemoglobin are within normal limits. I do not see any renal contraindication for planned surgery. Please note that this is an informal assessment. She still needs her actual pre op physical and other testing as outlined in their note on 21.\"      Today patient denies fever, cough, shortness of breath, chest pain, irregular HR, or ankle edema. She denies pain. She is anxious today and presents with BP of 149/101.       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 No known reactions.      Hx of abnormal bleeding or anti-platelet " use: ASA 81 mg daily.     Menstrual history: No LMP recorded. Patient is postmenopausal.    Steroid use in the last year: Denies.     Personal or FH with difficulty with Anesthesia:  PONV. Patient also reports that her BP dropped in 2 of her surgeries and had to be stabilized before they proceeded. She was acute ill with sepsis prior to one episode.     Prior to Admission Medications  Current Outpatient Medications   Medication Sig Dispense Refill     aspirin 81 MG EC tablet Take 81 mg by mouth daily       augmented betamethasone dipropionate (DIPROLENE) 0.05 % external lotion APPLY TO THE AFFECTED AREA OF SCALP TWICE DAILY UNTIL IMPROVED       BIOTIN PO Take 1 tablet by mouth daily       busPIRone (BUSPAR) 5 MG tablet Take 5 mg in the morning  Take 5 mg in the afternoon and   Take 10 mg (2 tablets) by mouth at bedtime.       carvedilol (COREG) 25 MG tablet Take 25 mg by mouth 2 times daily (with meals)        cholecalciferol 25 MCG (1000 UT) TABS Take 1,000 Units by mouth daily       dutasteride (AVODART) 0.5 MG capsule Take 0.5 mg by mouth 2 times daily        EPINEPHrine (ANY BX GENERIC EQUIV) 0.3 MG/0.3ML injection 2-pack INJECT 0.3 ML INTO THE MUSCLE ONE TIME PRF ALLERGIC REACTION       escitalopram (LEXAPRO) 20 MG tablet Take 20 mg by mouth every morning       finasteride (PROSCAR) 5 MG tablet Take 5 mg by mouth daily        ketoconazole (NIZORAL) 2 % external shampoo APPLY TO AFFECTED AREAS ON SCALP ONCE EVERY OTHER WEEK AND THEN WASH OFF       nortriptyline (PAMELOR) 25 MG capsule Take 25 mg by mouth At Bedtime          Allergies  Allergies   Allergen Reactions     Warfarin Other (See Comments)     Internal bleeding reaction.     Wasp Venom Protein Angioedema and Other (See Comments)     Morphine Itching     Itching in throat per pt     Amlodipine Other (See Comments)     Edema         Social History  Social History     Socioeconomic History     Marital status:      Spouse name: Not on file     Number  of children: Not on file     Years of education: Not on file     Highest education level: Not on file   Occupational History     Not on file   Social Needs     Financial resource strain: Not on file     Food insecurity     Worry: Not on file     Inability: Not on file     Transportation needs     Medical: Not on file     Non-medical: Not on file   Tobacco Use     Smoking status: Never Smoker     Smokeless tobacco: Never Used   Substance and Sexual Activity     Alcohol use: Yes     Frequency: 2-4 times a month     Drug use: Never     Sexual activity: Not on file   Lifestyle     Physical activity     Days per week: Not on file     Minutes per session: Not on file     Stress: Not on file   Relationships     Social connections     Talks on phone: Not on file     Gets together: Not on file     Attends Taoist service: Not on file     Active member of club or organization: Not on file     Attends meetings of clubs or organizations: Not on file     Relationship status: Not on file     Intimate partner violence     Fear of current or ex partner: Not on file     Emotionally abused: Not on file     Physically abused: Not on file     Forced sexual activity: Not on file   Other Topics Concern     Not on file   Social History Narrative     Not on file       Family History  Family History   Problem Relation Age of Onset     Diabetes Mother      Hypertension Mother      Multiple myeloma Mother      Ulcerative Colitis Mother      Matson's esophagus Father      Cervical Cancer Sister      Diabetes Type 2  Brother      Asthma Maternal Grandmother      Breast Cancer Maternal Grandmother      Breast Cancer Paternal Grandmother        ROS  The complete review of systems is negative other than noted in the HPI or here.    ENT/Pulmonary:    (-) tobacco use   Neurologic: Neg   Cardiovascular:     (+) hypertension-----Taking blood thinners Pt has received instructions: Instructions Given to patient: Will hold ASA for 5 days prior to  "surgery. Previous cardiac testing   Echo: Date: Results:    Stress Test: Date: Results:    ECG Reviewed: Date: 7/26/20 Results:  SB  Cath: Date: Results:      METS/Exercise Tolerance: 4 - Raking leaves, gardening    Hematologic: History of transfusion, history of multiple blood clots    Musculoskeletal: NEG   GI/Hepatic:     (+) GERD, Inflammatory bowel disease,     Renal/Genitourinary:     (+) renal disease, type: CRI, Pt does not require dialysis,     Endo:     (+) thyroid problem,  Thyroid disease - Other,     Psychiatric/Substance Use:  - neg psychiatric ROS     Infectious Disease:  - neg infectious disease ROS     Malignancy:  - neg malignancy ROS     Other:  - neg other ROS         Vitals: BP (!) 154/110 (BP Location: Right arm, Patient Position: Chair, Cuff Size: Adult Regular)   Pulse 66   Temp 98.1  F (36.7  C) (Oral)   Resp 16   Ht 1.753 m (5' 9\")   Wt 77.1 kg (170 lb)   SpO2 95%   Breastfeeding No   BMI 25.10 kg/m    BMI= Body mass index is 25.1 kg/m .    Physical Exam  Constitutional: Awake, alert, cooperative, no apparent distress, and appears stated age.  Eyes: Pupils equal, round and reactive to light, extra ocular muscles intact, sclera clear, conjunctiva normal.  HENT: Normocephalic, oral pharynx with moist mucus membranes, good dentition. No goiter appreciated.   Respiratory: Clear to auscultation bilaterally, no crackles or wheezing. No cough or obvious dyspnea.  Cardiovascular: Regular rate and rhythm, normal S1 and S2, and no murmur noted.  Carotids +2, no bruits. No edema. Palpable pulses to radial  DP and PT arteries.   GI: Normal bowel sounds, soft, non-distended, non-tender, no masses palpated, Ileostomy RLQ covered with appliance. Surgical scars: midline, well healed. Small soft incisional hernia.   Lymph/Hematologic: No cervical lymphadenopathy and no supraclavicular lymphadenopathy.  Genitourinary:  Deferred.   Skin: Warm and diaphoretic.  No rashes at anticipated surgical site. "   Musculoskeletal: Full ROM of neck. There is no redness, warmth, or swelling of the joints. Gross motor strength is normal.    Neurologic: Awake, alert, oriented to name, place and time. Cranial nerves II-XII are grossly intact. Gait is normal.   Neuropsychiatric: Calm, cooperative. Normal affect.     Labs: (personally reviewed)  Lab Results   Component Value Date    WBC 5.9 06/02/2021     Lab Results   Component Value Date    RBC 4.66 06/02/2021     Lab Results   Component Value Date    HGB 14.4 06/02/2021     Lab Results   Component Value Date    HCT 44.6 06/02/2021     Lab Results   Component Value Date    MCV 96 06/02/2021     Lab Results   Component Value Date    MCH 30.9 06/02/2021     Lab Results   Component Value Date    MCHC 32.3 06/02/2021     Lab Results   Component Value Date    RDW 14.4 06/02/2021     Lab Results   Component Value Date     06/02/2021     Last Comprehensive Metabolic Panel:  Sodium   Date Value Ref Range Status   06/02/2021 141 133 - 144 mmol/L Final     Potassium   Date Value Ref Range Status   06/02/2021 4.4 3.4 - 5.3 mmol/L Final     Chloride   Date Value Ref Range Status   06/02/2021 111 (H) 94 - 109 mmol/L Final     Carbon Dioxide   Date Value Ref Range Status   06/02/2021 24 20 - 32 mmol/L Final     Anion Gap   Date Value Ref Range Status   06/02/2021 6 3 - 14 mmol/L Final     Glucose   Date Value Ref Range Status   06/02/2021 79 70 - 99 mg/dL Final     Urea Nitrogen   Date Value Ref Range Status   06/02/2021 29 7 - 30 mg/dL Final     Creatinine   Date Value Ref Range Status   06/02/2021 2.38 (H) 0.52 - 1.04 mg/dL Final     GFR Estimate   Date Value Ref Range Status   06/02/2021 21 (L) >60 mL/min/[1.73_m2] Final     Comment:     Non  GFR Calc  Starting 12/18/2018, serum creatinine based estimated GFR (eGFR) will be   calculated using the Chronic Kidney Disease Epidemiology Collaboration   (CKD-EPI) equation.       Calcium   Date Value Ref Range Status    06/02/2021 9.5 8.5 - 10.1 mg/dL Final     Lab Results   Component Value Date    AST 22 06/02/2021     Lab Results   Component Value Date    ALT 41 06/02/2021     No results found for: BILICONJ   Lab Results   Component Value Date    BILITOTAL 0.8 06/02/2021     Lab Results   Component Value Date    ALBUMIN 3.9 06/02/2021     Lab Results   Component Value Date    PROTTOTAL 7.6 06/02/2021      Lab Results   Component Value Date    ALKPHOS 59 06/02/2021     EKG: Personally reviewed 7/26/20 Sinus bradycardia  Cardiac echo: Last 2004  Personal review of a Two-Dimensional echocardiogram dated 01/221/04 demonstrates   normal Ejection fraction with ascending aorta upper normal at 3.7 cm.  5/11/21 CT abd/pelvis  1. Chronically thrombosed IVC with extensive venous collaterals. Chronic portal  vein thrombus and likely SMV thrombus.  2. Presumed small bowel obstruction of the ileostomy site has resolved since  11/07/2019.    5/7/21 Abd US  1. Decreased caliber of the portal system veins and tortuous blood vessels in  the prabha hepatis compatible with chronic post-thrombotic changes. The acute  thrombus seen within the portal system on 08/26/2020 has resolved.  2. Slightly echogenic renal parenchyma bilaterally suggesting medical renal  Disease.    US bilateral JULIETA with exercise 4/13/21  IMPRESSION:   1. Right lower extremity: Normal resting JULIETA of 1.07. Mildly reduced post exercise JULIETA of 0.85 with recovery to baseline by 7 minutes. Triphasic waveforms throughout. No evidence for hemodynamically significant stenoses or occlusions.   2. Left lower extremity: Normal resting JULIETA of 1.12. Normal post exercise JULIETA 1.04. Triphasic waveforms throughout. No evidence for hemodynamically significant stenoses or occlusions.    Imaging and cardiac testing reviewed by this provider    Outside records reviewed from: Care Everywhere    ASSESSMENT and PLAN  Radha Jameson is a 60 year old female scheduled to undergo INSERTION, STENT, URETER,  PREOPERATIVE, ROBOT-ASSISTED  ileal pouch-anal anastomosis, diverting loop ileostomy, SIGMOIDOSCOPY, FLEXIBLE with Ezequiel Hung and Destiny on 6/10/21. She has the following specific operative considerations:   - RCRI : 6.6% risk of major adverse cardiac event.   - Anesthesia considerations:  Refer to PAC assessment in anesthesia records  - VTE risk: 1.8%  - NICKI # of risks 3/8 = Intermediate risk  - Risk of PONV score = 4.  If > 2, anti-emetic intervention recommended. If 3 or > anti emetic intervention recommended with two or more meds    --Ulcerative colitis, refractory to medical management. S/p multiple surgeries, currently with Ileostomy. Above procedure now planned to complete  ileal pouch.   --PONV. Significant history. Final decisions regarding prophylaxis by Anesthesia on DOS. History of hypotension during two surgeries. Possibly attributed to acute illness with sepsis.   --HYPERTENSION. Presenting /101, attributed to anxiety but recheck 154/110. Patient was instructed to contact her PCP or physician following her BP and discuss management prior to surgery. She agreed. Will take Coreg on DOS. No other cardiac history, symptoms or meds. EKG above SB. Patient is a competitive weight  and is physically fit and active.   --Nonsmoker. Denies pulmonary symptoms. Intermediate risk for NICKI.  --Denies GERD at this time. No meds.  --History of multiple DVTs and thrombosis. Difficulty tolerating warfarin due to labile INR and bleeding episode. Currently on aspirin 81 mg and will hold for 5 days prior to surgery. Follows with outside Hematology with plan for postoperative anticoagulation above.   --Anemia. Last Hgb 14.4. Past history of blood transfusion. Type and screen drawn today.   --CKD. Cr today 2.38. Followed by [facility]. Will require careful monitoring during periop period.  --History of thyrotoxicosis in the past attributed to an herbal supplement.   --Depression. Will take Lexapro on DOS.  Buspar for anxiety and will take on DOS. Nortriptyline for sleep at HS.  --Pain management. Discussed possibility of nerve block if appropriate for patient's procedure. Final counseling and decisions by regional team on DOS.  --Proscar and Avodart for hair growth.  --Difficult IV stick with US sometimes required.  --Optimal recovery pathway.    Arrival time, NPO, shower and medication instructions provided by nursing staff today.     Patient was discussed with Dr Lomas. Patient will proceed.     LEVI Little CNS  Preoperative Assessment Center  [facility]  Phone: [phone]  Fax: [phone]

## 2021-06-03 LAB
BACTERIA SPEC CULT: NORMAL
Lab: NORMAL
SPECIMEN SOURCE: NORMAL

## 2021-06-03 NOTE — ADDENDUM NOTE
Addendum  created 06/03/21 1122 by Lesly Oliver APRN CNS    Order list changed, Order sets accessed

## 2021-06-07 ENCOUNTER — TELEPHONE (OUTPATIENT)
Dept: SURGERY | Facility: CLINIC | Age: 61
End: 2021-06-07

## 2021-06-07 NOTE — OR NURSING
Message left for patient regarding follow up on Blood Pressure following elevated blood pressure at PAC clinic visit. I will attempt to speak to patient later in the day.

## 2021-06-08 ENCOUNTER — TRANSFERRED RECORDS (OUTPATIENT)
Dept: HEALTH INFORMATION MANAGEMENT | Facility: CLINIC | Age: 61
End: 2021-06-08

## 2021-06-08 ENCOUNTER — ALLIED HEALTH/NURSE VISIT (OUTPATIENT)
Dept: INTERNAL MEDICINE | Facility: CLINIC | Age: 61
End: 2021-06-08
Payer: COMMERCIAL

## 2021-06-08 ENCOUNTER — TELEPHONE (OUTPATIENT)
Dept: SURGERY | Facility: CLINIC | Age: 61
End: 2021-06-08

## 2021-06-08 VITALS — DIASTOLIC BLOOD PRESSURE: 81 MMHG | SYSTOLIC BLOOD PRESSURE: 148 MMHG

## 2021-06-08 DIAGNOSIS — Z01.30 BLOOD PRESSURE CHECK: Primary | ICD-10-CM

## 2021-06-08 DIAGNOSIS — Z11.59 ENCOUNTER FOR SCREENING FOR OTHER VIRAL DISEASES: ICD-10-CM

## 2021-06-08 LAB
LABORATORY COMMENT REPORT: NORMAL
SARS-COV-2 RNA RESP QL NAA+PROBE: NEGATIVE
SARS-COV-2 RNA RESP QL NAA+PROBE: NORMAL
SPECIMEN SOURCE: NORMAL
SPECIMEN SOURCE: NORMAL

## 2021-06-08 PROCEDURE — 99207 PR NO CHARGE NURSE ONLY: CPT

## 2021-06-08 PROCEDURE — U0003 INFECTIOUS AGENT DETECTION BY NUCLEIC ACID (DNA OR RNA); SEVERE ACUTE RESPIRATORY SYNDROME CORONAVIRUS 2 (SARS-COV-2) (CORONAVIRUS DISEASE [COVID-19]), AMPLIFIED PROBE TECHNIQUE, MAKING USE OF HIGH THROUGHPUT TECHNOLOGIES AS DESCRIBED BY CMS-2020-01-R: HCPCS | Mod: 90 | Performed by: PATHOLOGY

## 2021-06-08 PROCEDURE — 99000 SPECIMEN HANDLING OFFICE-LAB: CPT | Performed by: PATHOLOGY

## 2021-06-08 PROCEDURE — U0005 INFEC AGEN DETEC AMPLI PROBE: HCPCS | Mod: 90 | Performed by: PATHOLOGY

## 2021-06-08 NOTE — NURSING NOTE
Chief Complaint   Patient presents with     Nurse Visit     bp check for preop       TODD Conway at 11:29 AM on 6/8/2021

## 2021-06-08 NOTE — NURSING NOTE
BP TRIPLE CHECK    A triple blood pressure AHA check was performed in clinic by myself. I instructed the patient to relax their arm and keep their legs uncrossed as the band automatically takes their pressure over 10 minutes. The patient voiced understanding.     Individual blood pressures were as follows:  136/91  130/86  129/88    Average blood pressure was:  131/88    The results of this pressure check were communicated to the provider.     Winifred Mitchell EMT at 11:36 AM on 6/8/2021

## 2021-06-08 NOTE — TELEPHONE ENCOUNTER
Health Call Center    Phone Message    May a detailed message be left on voicemail: yes     Reason for Call: Other: Dr.Osman Marc nurse has called to ask Dr. Hung to call  back. Writer has given the provider to provider line to Kristin. Anthony Foster has given Dr. Palma phone number here of . Anthony Foster would like if  left a voice message if  doesn't anwser. Thank you.    Action Taken: Message routed to:  [facility] ([facility] Rec    Travel Screening: Not Applicable

## 2021-06-08 NOTE — PROGRESS NOTES
Patient is affiliated with [facility] but has upcoming surgery at [facility]. I discussed with patient that she should follow-up with her primary care provider at [facility] regarding her blood pressure (Dr. Miguel Arndt) and request records from us if needed. Patient expressed understanding.     Winifred Mitchell, EMT at 11:45 AM on 6/8/2021

## 2021-06-08 NOTE — TELEPHONE ENCOUNTER
I gave Dr.Gaertner Dr Palma's phone number. I also called Kristin at [facility] and left a VM for her.

## 2021-06-10 ENCOUNTER — ANESTHESIA (OUTPATIENT)
Dept: SURGERY | Facility: CLINIC | Age: 61
DRG: 330 | End: 2021-06-10
Payer: COMMERCIAL

## 2021-06-10 ENCOUNTER — HOSPITAL ENCOUNTER (INPATIENT)
Facility: CLINIC | Age: 61
LOS: 4 days | Discharge: HOME | DRG: 330 | End: 2021-06-14
Attending: UROLOGY | Admitting: COLON & RECTAL SURGERY
Payer: COMMERCIAL

## 2021-06-10 ENCOUNTER — ANCILLARY PROCEDURE (OUTPATIENT)
Dept: ULTRASOUND IMAGING | Facility: CLINIC | Age: 61
End: 2021-06-10
Payer: COMMERCIAL

## 2021-06-10 DIAGNOSIS — K51.019 ULCERATIVE PANCOLITIS WITH COMPLICATION (H): ICD-10-CM

## 2021-06-10 LAB
ABO + RH BLD: NORMAL
ABO + RH BLD: NORMAL
ANION GAP SERPL CALCULATED.3IONS-SCNC: 5 MMOL/L (ref 3–14)
BLD GP AB SCN SERPL QL: NORMAL
BLOOD BANK CMNT PATIENT-IMP: NORMAL
BUN SERPL-MCNC: 26 MG/DL (ref 7–30)
CALCIUM SERPL-MCNC: 8.3 MG/DL (ref 8.5–10.1)
CHLORIDE SERPL-SCNC: 104 MMOL/L (ref 94–109)
CO2 SERPL-SCNC: 27 MMOL/L (ref 20–32)
CREAT SERPL-MCNC: 2.37 MG/DL (ref 0.52–1.04)
CREAT SERPL-MCNC: 2.53 MG/DL (ref 0.52–1.04)
GFR SERPL CREATININE-BSD FRML MDRD: 20 ML/MIN/{1.73_M2}
GFR SERPL CREATININE-BSD FRML MDRD: 21 ML/MIN/{1.73_M2}
GLUCOSE BLDC GLUCOMTR-MCNC: 89 MG/DL (ref 70–99)
GLUCOSE SERPL-MCNC: 118 MG/DL (ref 70–99)
HGB BLD-MCNC: 13.8 G/DL (ref 11.7–15.7)
MAGNESIUM SERPL-MCNC: 0.8 MG/DL (ref 1.6–2.3)
PHOSPHATE SERPL-MCNC: 4.3 MG/DL (ref 2.5–4.5)
POTASSIUM SERPL-SCNC: 5.1 MMOL/L (ref 3.4–5.3)
POTASSIUM SERPL-SCNC: 5.7 MMOL/L (ref 3.4–5.3)
POTASSIUM SERPL-SCNC: 5.8 MMOL/L (ref 3.4–5.3)
SODIUM SERPL-SCNC: 136 MMOL/L (ref 133–144)
SPECIMEN EXP DATE BLD: NORMAL

## 2021-06-10 PROCEDURE — 250N000011 HC RX IP 250 OP 636: Performed by: STUDENT IN AN ORGANIZED HEALTH CARE EDUCATION/TRAINING PROGRAM

## 2021-06-10 PROCEDURE — 999N001017 HC STATISTIC GLUCOSE BY METER IP

## 2021-06-10 PROCEDURE — 250N000011 HC RX IP 250 OP 636: Performed by: COLON & RECTAL SURGERY

## 2021-06-10 PROCEDURE — 999N000141 HC STATISTIC PRE-PROCEDURE NURSING ASSESSMENT: Performed by: UROLOGY

## 2021-06-10 PROCEDURE — 250N000013 HC RX MED GY IP 250 OP 250 PS 637: Performed by: COLON & RECTAL SURGERY

## 2021-06-10 PROCEDURE — 250N000011 HC RX IP 250 OP 636: Performed by: NURSE ANESTHETIST, CERTIFIED REGISTERED

## 2021-06-10 PROCEDURE — 84132 ASSAY OF SERUM POTASSIUM: CPT | Performed by: CLINICAL NURSE SPECIALIST

## 2021-06-10 PROCEDURE — 4A1685H MONITORING OF LYMPHATIC FLOW USING INDOCYANINE GREEN DYE, VIA NATURAL OR ARTIFICIAL OPENING ENDOSCOPIC: ICD-10-PCS | Performed by: UROLOGY

## 2021-06-10 PROCEDURE — 250N000009 HC RX 250: Performed by: NURSE ANESTHETIST, CERTIFIED REGISTERED

## 2021-06-10 PROCEDURE — 8E0W4CZ ROBOTIC ASSISTED PROCEDURE OF TRUNK REGION, PERCUTANEOUS ENDOSCOPIC APPROACH: ICD-10-PCS | Performed by: COLON & RECTAL SURGERY

## 2021-06-10 PROCEDURE — 250N000025 HC SEVOFLURANE, PER MIN: Performed by: UROLOGY

## 2021-06-10 PROCEDURE — 82565 ASSAY OF CREATININE: CPT | Performed by: CLINICAL NURSE SPECIALIST

## 2021-06-10 PROCEDURE — 0D1B0ZQ BYPASS ILEUM TO ANUS, OPEN APPROACH: ICD-10-PCS | Performed by: COLON & RECTAL SURGERY

## 2021-06-10 PROCEDURE — C1769 GUIDE WIRE: HCPCS | Performed by: UROLOGY

## 2021-06-10 PROCEDURE — 84100 ASSAY OF PHOSPHORUS: CPT | Performed by: COLON & RECTAL SURGERY

## 2021-06-10 PROCEDURE — 258N000003 HC RX IP 258 OP 636: Performed by: ANESTHESIOLOGY

## 2021-06-10 PROCEDURE — C9290 INJ, BUPIVACAINE LIPOSOME: HCPCS | Performed by: STUDENT IN AN ORGANIZED HEALTH CARE EDUCATION/TRAINING PROGRAM

## 2021-06-10 PROCEDURE — 710N000010 HC RECOVERY PHASE 1, LEVEL 2, PER MIN: Performed by: UROLOGY

## 2021-06-10 PROCEDURE — 250N000009 HC RX 250: Performed by: STUDENT IN AN ORGANIZED HEALTH CARE EDUCATION/TRAINING PROGRAM

## 2021-06-10 PROCEDURE — 0TJD8ZZ INSPECTION OF URETHRA, VIA NATURAL OR ARTIFICIAL OPENING ENDOSCOPIC: ICD-10-PCS | Performed by: UROLOGY

## 2021-06-10 PROCEDURE — 258N000003 HC RX IP 258 OP 636: Performed by: COLON & RECTAL SURGERY

## 2021-06-10 PROCEDURE — 250N000009 HC RX 250: Performed by: UROLOGY

## 2021-06-10 PROCEDURE — 0DJD8ZZ INSPECTION OF LOWER INTESTINAL TRACT, VIA NATURAL OR ARTIFICIAL OPENING ENDOSCOPIC: ICD-10-PCS | Performed by: COLON & RECTAL SURGERY

## 2021-06-10 PROCEDURE — 84132 ASSAY OF SERUM POTASSIUM: CPT | Performed by: COLON & RECTAL SURGERY

## 2021-06-10 PROCEDURE — 80048 BASIC METABOLIC PNL TOTAL CA: CPT | Performed by: COLON & RECTAL SURGERY

## 2021-06-10 PROCEDURE — 85018 HEMOGLOBIN: CPT | Performed by: COLON & RECTAL SURGERY

## 2021-06-10 PROCEDURE — 360N000080 HC SURGERY LEVEL 7, PER MIN: Performed by: UROLOGY

## 2021-06-10 PROCEDURE — 250N000011 HC RX IP 250 OP 636: Performed by: ANESTHESIOLOGY

## 2021-06-10 PROCEDURE — C1765 ADHESION BARRIER: HCPCS | Performed by: UROLOGY

## 2021-06-10 PROCEDURE — 0DBB4ZZ EXCISION OF ILEUM, PERCUTANEOUS ENDOSCOPIC APPROACH: ICD-10-PCS | Performed by: COLON & RECTAL SURGERY

## 2021-06-10 PROCEDURE — 36415 COLL VENOUS BLD VENIPUNCTURE: CPT | Performed by: COLON & RECTAL SURGERY

## 2021-06-10 PROCEDURE — 83735 ASSAY OF MAGNESIUM: CPT | Performed by: COLON & RECTAL SURGERY

## 2021-06-10 PROCEDURE — 370N000017 HC ANESTHESIA TECHNICAL FEE, PER MIN: Performed by: UROLOGY

## 2021-06-10 PROCEDURE — 272N000001 HC OR GENERAL SUPPLY STERILE: Performed by: UROLOGY

## 2021-06-10 PROCEDURE — 120N000002 HC R&B MED SURG/OB UMMC

## 2021-06-10 PROCEDURE — 36415 COLL VENOUS BLD VENIPUNCTURE: CPT | Performed by: CLINICAL NURSE SPECIALIST

## 2021-06-10 PROCEDURE — 258N000003 HC RX IP 258 OP 636: Performed by: NURSE ANESTHETIST, CERTIFIED REGISTERED

## 2021-06-10 RX ORDER — ERTAPENEM 1 G/1
1 INJECTION, POWDER, LYOPHILIZED, FOR SOLUTION INTRAMUSCULAR; INTRAVENOUS ONCE
Status: COMPLETED | OUTPATIENT
Start: 2021-06-11 | End: 2021-06-11

## 2021-06-10 RX ORDER — FENTANYL CITRATE 50 UG/ML
INJECTION, SOLUTION INTRAMUSCULAR; INTRAVENOUS PRN
Status: DISCONTINUED | OUTPATIENT
Start: 2021-06-10 | End: 2021-06-10

## 2021-06-10 RX ORDER — EPHEDRINE SULFATE 50 MG/ML
INJECTION, SOLUTION INTRAMUSCULAR; INTRAVENOUS; SUBCUTANEOUS PRN
Status: DISCONTINUED | OUTPATIENT
Start: 2021-06-10 | End: 2021-06-10

## 2021-06-10 RX ORDER — HYDROMORPHONE HYDROCHLORIDE 1 MG/ML
.3-.5 INJECTION, SOLUTION INTRAMUSCULAR; INTRAVENOUS; SUBCUTANEOUS EVERY 5 MIN PRN
Status: DISCONTINUED | OUTPATIENT
Start: 2021-06-10 | End: 2021-06-10 | Stop reason: HOSPADM

## 2021-06-10 RX ORDER — OXYCODONE HYDROCHLORIDE 10 MG/1
10 TABLET ORAL EVERY 4 HOURS PRN
Status: DISCONTINUED | OUTPATIENT
Start: 2021-06-10 | End: 2021-06-14 | Stop reason: HOSPADM

## 2021-06-10 RX ORDER — HYDROMORPHONE HCL IN WATER/PF 6 MG/30 ML
0.2 PATIENT CONTROLLED ANALGESIA SYRINGE INTRAVENOUS
Status: DISCONTINUED | OUTPATIENT
Start: 2021-06-10 | End: 2021-06-13

## 2021-06-10 RX ORDER — CELECOXIB 200 MG/1
200 CAPSULE ORAL ONCE
Status: COMPLETED | OUTPATIENT
Start: 2021-06-10 | End: 2021-06-10

## 2021-06-10 RX ORDER — LIDOCAINE HYDROCHLORIDE 20 MG/ML
INJECTION, SOLUTION INFILTRATION; PERINEURAL PRN
Status: DISCONTINUED | OUTPATIENT
Start: 2021-06-10 | End: 2021-06-10

## 2021-06-10 RX ORDER — MAGNESIUM SULFATE HEPTAHYDRATE 40 MG/ML
4 INJECTION, SOLUTION INTRAVENOUS ONCE
Status: COMPLETED | OUTPATIENT
Start: 2021-06-10 | End: 2021-06-11

## 2021-06-10 RX ORDER — LIDOCAINE 40 MG/G
CREAM TOPICAL
Status: DISCONTINUED | OUTPATIENT
Start: 2021-06-10 | End: 2021-06-14 | Stop reason: HOSPADM

## 2021-06-10 RX ORDER — ONDANSETRON 2 MG/ML
4 INJECTION INTRAMUSCULAR; INTRAVENOUS EVERY 30 MIN PRN
Status: DISCONTINUED | OUTPATIENT
Start: 2021-06-10 | End: 2021-06-10 | Stop reason: HOSPADM

## 2021-06-10 RX ORDER — PHENYLEPHRINE HCL IN 0.9% NACL 50MG/250ML
0.5-6 PLASTIC BAG, INJECTION (ML) INTRAVENOUS CONTINUOUS
Status: DISCONTINUED | OUTPATIENT
Start: 2021-06-10 | End: 2021-06-10 | Stop reason: HOSPADM

## 2021-06-10 RX ORDER — NALOXONE HYDROCHLORIDE 0.4 MG/ML
0.4 INJECTION, SOLUTION INTRAMUSCULAR; INTRAVENOUS; SUBCUTANEOUS
Status: DISCONTINUED | OUTPATIENT
Start: 2021-06-10 | End: 2021-06-14 | Stop reason: HOSPADM

## 2021-06-10 RX ORDER — SODIUM CHLORIDE, SODIUM LACTATE, POTASSIUM CHLORIDE, CALCIUM CHLORIDE 600; 310; 30; 20 MG/100ML; MG/100ML; MG/100ML; MG/100ML
INJECTION, SOLUTION INTRAVENOUS CONTINUOUS PRN
Status: DISCONTINUED | OUTPATIENT
Start: 2021-06-10 | End: 2021-06-10

## 2021-06-10 RX ORDER — NALOXONE HYDROCHLORIDE 0.4 MG/ML
0.2 INJECTION, SOLUTION INTRAMUSCULAR; INTRAVENOUS; SUBCUTANEOUS
Status: DISCONTINUED | OUTPATIENT
Start: 2021-06-10 | End: 2021-06-10 | Stop reason: HOSPADM

## 2021-06-10 RX ORDER — BUSPIRONE HYDROCHLORIDE 10 MG/1
10 TABLET ORAL AT BEDTIME
Status: DISCONTINUED | OUTPATIENT
Start: 2021-06-10 | End: 2021-06-14 | Stop reason: HOSPADM

## 2021-06-10 RX ORDER — FLUMAZENIL 0.1 MG/ML
0.2 INJECTION, SOLUTION INTRAVENOUS
Status: DISCONTINUED | OUTPATIENT
Start: 2021-06-10 | End: 2021-06-10 | Stop reason: HOSPADM

## 2021-06-10 RX ORDER — ERTAPENEM 1 G/1
1 INJECTION, POWDER, LYOPHILIZED, FOR SOLUTION INTRAMUSCULAR; INTRAVENOUS
Status: DISCONTINUED | OUTPATIENT
Start: 2021-06-10 | End: 2021-06-10 | Stop reason: HOSPADM

## 2021-06-10 RX ORDER — ACETAMINOPHEN 500 MG
1000 TABLET ORAL 4 TIMES DAILY
Status: DISCONTINUED | OUTPATIENT
Start: 2021-06-10 | End: 2021-06-14 | Stop reason: HOSPADM

## 2021-06-10 RX ORDER — DEXAMETHASONE SODIUM PHOSPHATE 4 MG/ML
INJECTION, SOLUTION INTRA-ARTICULAR; INTRALESIONAL; INTRAMUSCULAR; INTRAVENOUS; SOFT TISSUE PRN
Status: DISCONTINUED | OUTPATIENT
Start: 2021-06-10 | End: 2021-06-10

## 2021-06-10 RX ORDER — CARVEDILOL 12.5 MG/1
25 TABLET ORAL 2 TIMES DAILY WITH MEALS
Status: DISCONTINUED | OUTPATIENT
Start: 2021-06-11 | End: 2021-06-14 | Stop reason: HOSPADM

## 2021-06-10 RX ORDER — NALOXONE HYDROCHLORIDE 0.4 MG/ML
0.4 INJECTION, SOLUTION INTRAMUSCULAR; INTRAVENOUS; SUBCUTANEOUS
Status: DISCONTINUED | OUTPATIENT
Start: 2021-06-10 | End: 2021-06-10 | Stop reason: HOSPADM

## 2021-06-10 RX ORDER — DROPERIDOL 2.5 MG/ML
0.62 INJECTION, SOLUTION INTRAMUSCULAR; INTRAVENOUS ONCE
Status: DISCONTINUED | OUTPATIENT
Start: 2021-06-10 | End: 2021-06-10 | Stop reason: HOSPADM

## 2021-06-10 RX ORDER — DUTASTERIDE 0.5 MG/1
0.5 CAPSULE, LIQUID FILLED ORAL 2 TIMES DAILY
Status: DISCONTINUED | OUTPATIENT
Start: 2021-06-11 | End: 2021-06-14 | Stop reason: HOSPADM

## 2021-06-10 RX ORDER — INDOCYANINE GREEN AND WATER 25 MG
KIT INJECTION PRN
Status: DISCONTINUED | OUTPATIENT
Start: 2021-06-10 | End: 2021-06-10 | Stop reason: HOSPADM

## 2021-06-10 RX ORDER — SODIUM CHLORIDE, SODIUM LACTATE, POTASSIUM CHLORIDE, CALCIUM CHLORIDE 600; 310; 30; 20 MG/100ML; MG/100ML; MG/100ML; MG/100ML
INJECTION, SOLUTION INTRAVENOUS CONTINUOUS
Status: DISCONTINUED | OUTPATIENT
Start: 2021-06-10 | End: 2021-06-13

## 2021-06-10 RX ORDER — DEXAMETHASONE SODIUM PHOSPHATE 10 MG/ML
INJECTION, SOLUTION INTRAMUSCULAR; INTRAVENOUS
Status: DISCONTINUED | OUTPATIENT
Start: 2021-06-10 | End: 2021-06-11

## 2021-06-10 RX ORDER — FENTANYL CITRATE 50 UG/ML
25-50 INJECTION, SOLUTION INTRAMUSCULAR; INTRAVENOUS
Status: DISCONTINUED | OUTPATIENT
Start: 2021-06-10 | End: 2021-06-10 | Stop reason: HOSPADM

## 2021-06-10 RX ORDER — SODIUM CHLORIDE, SODIUM LACTATE, POTASSIUM CHLORIDE, CALCIUM CHLORIDE 600; 310; 30; 20 MG/100ML; MG/100ML; MG/100ML; MG/100ML
INJECTION, SOLUTION INTRAVENOUS CONTINUOUS
Status: DISCONTINUED | OUTPATIENT
Start: 2021-06-10 | End: 2021-06-10 | Stop reason: HOSPADM

## 2021-06-10 RX ORDER — PROPOFOL 10 MG/ML
INJECTION, EMULSION INTRAVENOUS PRN
Status: DISCONTINUED | OUTPATIENT
Start: 2021-06-10 | End: 2021-06-10

## 2021-06-10 RX ORDER — BUSPIRONE HYDROCHLORIDE 5 MG/1
5 TABLET ORAL 2 TIMES DAILY
Status: DISCONTINUED | OUTPATIENT
Start: 2021-06-11 | End: 2021-06-14 | Stop reason: HOSPADM

## 2021-06-10 RX ORDER — BUPIVACAINE HYDROCHLORIDE 2.5 MG/ML
INJECTION, SOLUTION EPIDURAL; INFILTRATION; INTRACAUDAL
Status: COMPLETED | OUTPATIENT
Start: 2021-06-10 | End: 2021-06-10

## 2021-06-10 RX ORDER — ONDANSETRON 4 MG/1
4 TABLET, ORALLY DISINTEGRATING ORAL EVERY 30 MIN PRN
Status: DISCONTINUED | OUTPATIENT
Start: 2021-06-10 | End: 2021-06-10 | Stop reason: HOSPADM

## 2021-06-10 RX ORDER — NALOXONE HYDROCHLORIDE 0.4 MG/ML
0.2 INJECTION, SOLUTION INTRAMUSCULAR; INTRAVENOUS; SUBCUTANEOUS
Status: DISCONTINUED | OUTPATIENT
Start: 2021-06-10 | End: 2021-06-14 | Stop reason: HOSPADM

## 2021-06-10 RX ORDER — ESCITALOPRAM OXALATE 10 MG/1
20 TABLET ORAL EVERY MORNING
Status: DISCONTINUED | OUTPATIENT
Start: 2021-06-11 | End: 2021-06-14 | Stop reason: HOSPADM

## 2021-06-10 RX ORDER — LORAZEPAM 2 MG/ML
0.5 INJECTION INTRAMUSCULAR EVERY 6 HOURS PRN
Status: DISCONTINUED | OUTPATIENT
Start: 2021-06-10 | End: 2021-06-13

## 2021-06-10 RX ORDER — HYDROMORPHONE HCL IN WATER/PF 6 MG/30 ML
0.4 PATIENT CONTROLLED ANALGESIA SYRINGE INTRAVENOUS
Status: DISCONTINUED | OUTPATIENT
Start: 2021-06-10 | End: 2021-06-13

## 2021-06-10 RX ORDER — FINASTERIDE 5 MG/1
5 TABLET, FILM COATED ORAL DAILY
Status: DISCONTINUED | OUTPATIENT
Start: 2021-06-11 | End: 2021-06-14 | Stop reason: HOSPADM

## 2021-06-10 RX ORDER — OXYCODONE HYDROCHLORIDE 5 MG/1
5 TABLET ORAL EVERY 4 HOURS PRN
Status: DISCONTINUED | OUTPATIENT
Start: 2021-06-10 | End: 2021-06-14 | Stop reason: HOSPADM

## 2021-06-10 RX ORDER — LIDOCAINE 40 MG/G
CREAM TOPICAL
Status: DISCONTINUED | OUTPATIENT
Start: 2021-06-10 | End: 2021-06-10 | Stop reason: HOSPADM

## 2021-06-10 RX ORDER — GLYCOPYRROLATE 0.2 MG/ML
INJECTION, SOLUTION INTRAMUSCULAR; INTRAVENOUS PRN
Status: DISCONTINUED | OUTPATIENT
Start: 2021-06-10 | End: 2021-06-10

## 2021-06-10 RX ORDER — GRANISETRON HYDROCHLORIDE 1 MG/ML
1 INJECTION INTRAVENOUS ONCE
Status: COMPLETED | OUTPATIENT
Start: 2021-06-10 | End: 2021-06-10

## 2021-06-10 RX ADMIN — Medication 5 MG: at 13:18

## 2021-06-10 RX ADMIN — BUPIVACAINE HYDROCHLORIDE 20 ML: 2.5 INJECTION, SOLUTION EPIDURAL; INFILTRATION; INTRACAUDAL; PERINEURAL at 11:57

## 2021-06-10 RX ADMIN — SODIUM CHLORIDE, POTASSIUM CHLORIDE, SODIUM LACTATE AND CALCIUM CHLORIDE: 600; 310; 30; 20 INJECTION, SOLUTION INTRAVENOUS at 11:31

## 2021-06-10 RX ADMIN — FENTANYL CITRATE 50 MCG: 50 INJECTION, SOLUTION INTRAMUSCULAR; INTRAVENOUS at 12:48

## 2021-06-10 RX ADMIN — BUPIVACAINE 20 ML: 13.3 INJECTION, SUSPENSION, LIPOSOMAL INFILTRATION at 11:57

## 2021-06-10 RX ADMIN — ROCURONIUM BROMIDE 30 MG: 10 INJECTION INTRAVENOUS at 15:38

## 2021-06-10 RX ADMIN — BUSPIRONE HYDROCHLORIDE 10 MG: 10 TABLET ORAL at 22:31

## 2021-06-10 RX ADMIN — MIDAZOLAM 1 MG: 1 INJECTION INTRAMUSCULAR; INTRAVENOUS at 11:39

## 2021-06-10 RX ADMIN — ROCURONIUM BROMIDE 20 MG: 10 INJECTION INTRAVENOUS at 14:44

## 2021-06-10 RX ADMIN — Medication 5 MG: at 12:58

## 2021-06-10 RX ADMIN — HYDROMORPHONE HYDROCHLORIDE 0.2 MG: 0.2 INJECTION, SOLUTION INTRAMUSCULAR; INTRAVENOUS; SUBCUTANEOUS at 21:58

## 2021-06-10 RX ADMIN — ROCURONIUM BROMIDE 30 MG: 10 INJECTION INTRAVENOUS at 13:25

## 2021-06-10 RX ADMIN — HYDROMORPHONE HYDROCHLORIDE 0.5 MG: 1 INJECTION, SOLUTION INTRAMUSCULAR; INTRAVENOUS; SUBCUTANEOUS at 19:15

## 2021-06-10 RX ADMIN — LIDOCAINE HYDROCHLORIDE 80 MG: 20 INJECTION, SOLUTION INFILTRATION; PERINEURAL at 12:48

## 2021-06-10 RX ADMIN — GRANISETRON HYDROCHLORIDE 1 MG: 1 INJECTION INTRAVENOUS at 11:08

## 2021-06-10 RX ADMIN — SODIUM CHLORIDE, POTASSIUM CHLORIDE, SODIUM LACTATE AND CALCIUM CHLORIDE: 600; 310; 30; 20 INJECTION, SOLUTION INTRAVENOUS at 19:18

## 2021-06-10 RX ADMIN — ROCURONIUM BROMIDE 10 MG: 10 INJECTION INTRAVENOUS at 17:00

## 2021-06-10 RX ADMIN — FENTANYL CITRATE 50 MCG: 50 INJECTION, SOLUTION INTRAMUSCULAR; INTRAVENOUS at 11:39

## 2021-06-10 RX ADMIN — ROCURONIUM BROMIDE 50 MG: 10 INJECTION INTRAVENOUS at 12:48

## 2021-06-10 RX ADMIN — ROCURONIUM BROMIDE 10 MG: 10 INJECTION INTRAVENOUS at 17:46

## 2021-06-10 RX ADMIN — SUGAMMADEX 200 MG: 100 INJECTION, SOLUTION INTRAVENOUS at 18:26

## 2021-06-10 RX ADMIN — OXYCODONE HYDROCHLORIDE 5 MG: 5 TABLET ORAL at 22:30

## 2021-06-10 RX ADMIN — SODIUM CHLORIDE, POTASSIUM CHLORIDE, SODIUM LACTATE AND CALCIUM CHLORIDE: 600; 310; 30; 20 INJECTION, SOLUTION INTRAVENOUS at 15:39

## 2021-06-10 RX ADMIN — MAGNESIUM SULFATE IN WATER 4 G: 40 INJECTION, SOLUTION INTRAVENOUS at 23:46

## 2021-06-10 RX ADMIN — GLYCOPYRROLATE 0.1 MG: 0.2 INJECTION, SOLUTION INTRAMUSCULAR; INTRAVENOUS at 13:11

## 2021-06-10 RX ADMIN — PROPOFOL 150 MG: 10 INJECTION, EMULSION INTRAVENOUS at 12:48

## 2021-06-10 RX ADMIN — GLYCOPYRROLATE 0.1 MG: 0.2 INJECTION, SOLUTION INTRAMUSCULAR; INTRAVENOUS at 13:23

## 2021-06-10 RX ADMIN — MIDAZOLAM 2 MG: 1 INJECTION INTRAMUSCULAR; INTRAVENOUS at 12:48

## 2021-06-10 RX ADMIN — CELECOXIB 200 MG: 200 CAPSULE ORAL at 11:12

## 2021-06-10 RX ADMIN — Medication 10 MG: at 13:05

## 2021-06-10 RX ADMIN — DEXAMETHASONE SODIUM PHOSPHATE 4 MG: 4 INJECTION, SOLUTION INTRA-ARTICULAR; INTRALESIONAL; INTRAMUSCULAR; INTRAVENOUS; SOFT TISSUE at 13:00

## 2021-06-10 RX ADMIN — HYDROMORPHONE HYDROCHLORIDE 0.5 MG: 1 INJECTION, SOLUTION INTRAMUSCULAR; INTRAVENOUS; SUBCUTANEOUS at 19:35

## 2021-06-10 RX ADMIN — Medication 5 MG: at 13:01

## 2021-06-10 RX ADMIN — FENTANYL CITRATE 50 MCG: 50 INJECTION, SOLUTION INTRAMUSCULAR; INTRAVENOUS at 18:35

## 2021-06-10 RX ADMIN — GLUCAGON HYDROCHLORIDE 1 MG: KIT at 16:35

## 2021-06-10 RX ADMIN — ACETAMINOPHEN 1000 MG: 500 TABLET, FILM COATED ORAL at 22:30

## 2021-06-10 RX ADMIN — ENOXAPARIN SODIUM 30 MG: 30 INJECTION SUBCUTANEOUS at 11:12

## 2021-06-10 RX ADMIN — HYDROMORPHONE HYDROCHLORIDE 0.5 MG: 1 INJECTION, SOLUTION INTRAMUSCULAR; INTRAVENOUS; SUBCUTANEOUS at 18:17

## 2021-06-10 RX ADMIN — ROCURONIUM BROMIDE 20 MG: 10 INJECTION INTRAVENOUS at 13:58

## 2021-06-10 RX ADMIN — HYDROMORPHONE HYDROCHLORIDE 0.5 MG: 1 INJECTION, SOLUTION INTRAMUSCULAR; INTRAVENOUS; SUBCUTANEOUS at 20:12

## 2021-06-10 RX ADMIN — FENTANYL CITRATE 50 MCG: 50 INJECTION, SOLUTION INTRAMUSCULAR; INTRAVENOUS at 13:48

## 2021-06-10 RX ADMIN — HYDROMORPHONE HYDROCHLORIDE 0.5 MG: 1 INJECTION, SOLUTION INTRAMUSCULAR; INTRAVENOUS; SUBCUTANEOUS at 16:37

## 2021-06-10 RX ADMIN — SODIUM CHLORIDE, POTASSIUM CHLORIDE, SODIUM LACTATE AND CALCIUM CHLORIDE: 600; 310; 30; 20 INJECTION, SOLUTION INTRAVENOUS at 18:00

## 2021-06-10 RX ADMIN — Medication 10 MG: at 17:34

## 2021-06-10 RX ADMIN — SODIUM CHLORIDE, POTASSIUM CHLORIDE, SODIUM LACTATE AND CALCIUM CHLORIDE: 600; 310; 30; 20 INJECTION, SOLUTION INTRAVENOUS at 12:38

## 2021-06-10 ASSESSMENT — MIFFLIN-ST. JEOR: SCORE: 1396.38

## 2021-06-10 NOTE — BRIEF OP NOTE
[facility]    Brief Operative Note    Pre-operative diagnosis: Ulcerative pancolitis with complication (H) [K51.019]  Post-operative diagnosis Same as pre-operative diagnosis    Procedure: Procedure(s):  INSERTION, STENT, URETER, PREOPERATIVE  ROBOT-ASSISTED  ileal pouch-anal anastomosis, diverting loop ileostomy  SIGMOIDOSCOPY, FLEXIBLE  Surgeon: Surgeon(s) and Role:  Panel 1:     * Joaquin Dixon MD - Primary     * Cass Burkett MD - Assisting     * Nori Mcguire MD - Resident - Assisting  Panel 2:     * Uriel Hung MD - Primary     * Jeff Evans MD  Panel 3:     * Uriel Hung MD - Primary     * Jeff Evans MD  Anesthesia: General   Estimated blood loss: 200 ml  Drains:  19Fr Maikol drain in the pelvis  Specimens: * No specimens in log *  Findings:   Known pelvic varices adding difficulty to dissection. 25mm EEA stapler with tension free anastomosis.  Complications: None.  Implants: * No implants in log *

## 2021-06-10 NOTE — ANESTHESIA CARE TRANSFER NOTE
Patient: Radha COSTA Stalcar    Procedure(s):  INSERTION, STENT, URETER, PREOPERATIVE  ROBOT-ASSISTED  ileal pouch-anal anastomosis, diverting loop ileostomy  SIGMOIDOSCOPY, FLEXIBLE    Diagnosis: Ulcerative pancolitis with complication (H) [K51.019]  Diagnosis Additional Information: No value filed.    Anesthesia Type:   General     Note:    Oropharynx: oropharynx clear of all foreign objects and spontaneously breathing  Level of Consciousness: awake  Oxygen Supplementation: face mask  Level of Supplemental Oxygen (L/min / FiO2): 6  Independent Airway: airway patency satisfactory and stable  Dentition: dentition unchanged  Vital Signs Stable: post-procedure vital signs reviewed and stable  Report to RN Given: handoff report given  Patient transferred to: PACU    Handoff Report: Identifed the Patient, Identified the Reponsible Provider, Reviewed the pertinent medical history, Discussed the surgical course, Reviewed Intra-OP anesthesia mangement and issues during anesthesia, Set expectations for post-procedure period and Allowed opportunity for questions and acknowledgement of understanding      Vitals: (Last set prior to Anesthesia Care Transfer)  CRNA VITALS  6/10/2021 1806 - 6/10/2021 1841      6/10/2021             NIBP:  (!) 120/101    Ht Rate:  56    SpO2:  100 %        Electronically Signed By: LEVI Ware CRNA  Delilah 10, 2021  6:41 PM

## 2021-06-10 NOTE — ANESTHESIA PROCEDURE NOTES
TAP Procedure Note  Pre-Procedure   Staff -        Anesthesiologist:  Amado Velasquez MD       Resident/Fellow: Danna Paz MD       Performed By: resident       Location: pre-op       Pre-Anesthestic Checklist: patient identified, IV checked, site marked, risks and benefits discussed, informed consent, monitors and equipment checked, pre-op evaluation, at physician/surgeon's request and post-op pain management  Timeout:       Correct Patient: Yes        Correct Procedure: Yes        Correct Site: Yes        Correct Position: Yes        Correct Laterality: Yes        Site Marked: Yes  Procedure Documentation  Procedure: TAP       Diagnosis: POST OPERATIVE PAIN       Laterality: bilateral       Patient Position: supine       Patient Prep/Sterile Barriers: sterile gloves, mask       Skin prep: Chloraprep       Needle Type: short bevel       Needle Gauge: 21.        Needle Length (millimeters): 110        Ultrasound guided       1. Ultrasound was used to identify targeted nerve, plexus, vascular marker, or fascial plane and place a needle adjacent to it in real-time.       2. Ultrasound was used to visualize the spread of anesthetic in close proximity to the above referenced structure.       3. A permanent image is entered into the patient's record.    Assessment/Narrative         The placement was negative for: blood aspirated, painful injection and site bleeding       Paresthesias: No.     Bolus given via needle..        Secured via.        Insertion/Infusion Method: Single Shot       Complications: none       Injection made incrementally with aspirations every 5 mL.    Medication(s) Administered   Bupivacaine 0.25% PF (Infiltration), 20 mL  Bupivacaine liposome (Exparel) 1.3% LA inj susp (Infiltration), 20 mL

## 2021-06-10 NOTE — ANESTHESIA PROCEDURE NOTES
Airway       Patient location during procedure: OR  Staff -        CRNA: Pura Grady APRN CRNA       Performed By: CRNA  Consent for Airway        Urgency: elective  Indications and Patient Condition       Indications for airway management: marisol-procedural       Induction type:intravenous       Mask difficulty assessment: 1 - vent by mask    Final Airway Details       Final airway type: endotracheal airway       Successful airway: ETT - single  Endotracheal Airway Details        ETT size (mm): 7.0       Cuffed: yes       Successful intubation technique: direct laryngoscopy       DL Blade Type: [place] 2       Grade View of Cords: 1       Adjucts: stylet       Position: Right       Measured from: gums/teeth       Secured at (cm): 22       Bite block used: None    Post intubation assessment        Placement verified by: capnometry, equal breath sounds and chest rise        Number of attempts at approach: 1       Number of other approaches attempted: 0       Secured with: pink tape       Ease of procedure: easy       Dentition: Intact and Unchanged    Medication(s) Administered   Medication Administration Time: 6/10/2021 12:55 PM

## 2021-06-11 ENCOUNTER — APPOINTMENT (OUTPATIENT)
Dept: OCCUPATIONAL THERAPY | Facility: CLINIC | Age: 61
DRG: 330 | End: 2021-06-11
Attending: COLON & RECTAL SURGERY
Payer: COMMERCIAL

## 2021-06-11 LAB
ANION GAP SERPL CALCULATED.3IONS-SCNC: 6 MMOL/L (ref 3–14)
BUN SERPL-MCNC: 32 MG/DL (ref 7–30)
CALCIUM SERPL-MCNC: 8.2 MG/DL (ref 8.5–10.1)
CHLORIDE SERPL-SCNC: 103 MMOL/L (ref 94–109)
CO2 SERPL-SCNC: 24 MMOL/L (ref 20–32)
CREAT SERPL-MCNC: 2.67 MG/DL (ref 0.52–1.04)
GFR SERPL CREATININE-BSD FRML MDRD: 19 ML/MIN/{1.73_M2}
GLUCOSE SERPL-MCNC: 125 MG/DL (ref 70–99)
HGB BLD-MCNC: 13.3 G/DL (ref 11.7–15.7)
MAGNESIUM SERPL-MCNC: 2.2 MG/DL (ref 1.6–2.3)
PLATELET # BLD AUTO: 219 10E9/L (ref 150–450)
POTASSIUM SERPL-SCNC: 5.3 MMOL/L (ref 3.4–5.3)
SODIUM SERPL-SCNC: 134 MMOL/L (ref 133–144)

## 2021-06-11 PROCEDURE — 83735 ASSAY OF MAGNESIUM: CPT | Performed by: COLON & RECTAL SURGERY

## 2021-06-11 PROCEDURE — 258N000003 HC RX IP 258 OP 636: Performed by: PHYSICIAN ASSISTANT

## 2021-06-11 PROCEDURE — 999N000198 HC STATISTIC WOC PT EDUCATION, 16-30 MIN

## 2021-06-11 PROCEDURE — 258N000003 HC RX IP 258 OP 636: Performed by: COLON & RECTAL SURGERY

## 2021-06-11 PROCEDURE — 250N000013 HC RX MED GY IP 250 OP 250 PS 637: Performed by: COLON & RECTAL SURGERY

## 2021-06-11 PROCEDURE — 97165 OT EVAL LOW COMPLEX 30 MIN: CPT | Mod: GO

## 2021-06-11 PROCEDURE — 999N000147 HC STATISTIC PT IP EVAL DEFER

## 2021-06-11 PROCEDURE — 80048 BASIC METABOLIC PNL TOTAL CA: CPT | Performed by: COLON & RECTAL SURGERY

## 2021-06-11 PROCEDURE — 97530 THERAPEUTIC ACTIVITIES: CPT | Mod: GO

## 2021-06-11 PROCEDURE — 97535 SELF CARE MNGMENT TRAINING: CPT | Mod: GO

## 2021-06-11 PROCEDURE — 250N000009 HC RX 250: Performed by: COLON & RECTAL SURGERY

## 2021-06-11 PROCEDURE — 999N000128 HC STATISTIC PERIPHERAL IV START W/O US GUIDANCE

## 2021-06-11 PROCEDURE — 120N000002 HC R&B MED SURG/OB UMMC

## 2021-06-11 PROCEDURE — 36415 COLL VENOUS BLD VENIPUNCTURE: CPT | Performed by: COLON & RECTAL SURGERY

## 2021-06-11 PROCEDURE — 85018 HEMOGLOBIN: CPT | Performed by: COLON & RECTAL SURGERY

## 2021-06-11 PROCEDURE — 250N000011 HC RX IP 250 OP 636: Performed by: COLON & RECTAL SURGERY

## 2021-06-11 PROCEDURE — 85049 AUTOMATED PLATELET COUNT: CPT | Performed by: COLON & RECTAL SURGERY

## 2021-06-11 RX ORDER — CALCIUM GLUCONATE 20 MG/ML
2 INJECTION, SOLUTION INTRAVENOUS ONCE
Status: COMPLETED | OUTPATIENT
Start: 2021-06-11 | End: 2021-06-11

## 2021-06-11 RX ADMIN — HYDROMORPHONE HYDROCHLORIDE 0.2 MG: 0.2 INJECTION, SOLUTION INTRAMUSCULAR; INTRAVENOUS; SUBCUTANEOUS at 07:45

## 2021-06-11 RX ADMIN — ERTAPENEM SODIUM 1 G: 1 INJECTION, POWDER, LYOPHILIZED, FOR SOLUTION INTRAMUSCULAR; INTRAVENOUS at 12:21

## 2021-06-11 RX ADMIN — CALCIUM GLUCONATE 2 G: 20 INJECTION, SOLUTION INTRAVENOUS at 18:51

## 2021-06-11 RX ADMIN — CARVEDILOL 25 MG: 12.5 TABLET, FILM COATED ORAL at 18:51

## 2021-06-11 RX ADMIN — OXYCODONE HYDROCHLORIDE 5 MG: 5 TABLET ORAL at 08:45

## 2021-06-11 RX ADMIN — OXYCODONE HYDROCHLORIDE 5 MG: 5 TABLET ORAL at 03:18

## 2021-06-11 RX ADMIN — BUSPIRONE HYDROCHLORIDE 5 MG: 5 TABLET ORAL at 14:12

## 2021-06-11 RX ADMIN — BUSPIRONE HYDROCHLORIDE 5 MG: 5 TABLET ORAL at 08:43

## 2021-06-11 RX ADMIN — HYDROMORPHONE HYDROCHLORIDE 0.4 MG: 0.2 INJECTION, SOLUTION INTRAMUSCULAR; INTRAVENOUS; SUBCUTANEOUS at 17:07

## 2021-06-11 RX ADMIN — SODIUM CHLORIDE, POTASSIUM CHLORIDE, SODIUM LACTATE AND CALCIUM CHLORIDE: 600; 310; 30; 20 INJECTION, SOLUTION INTRAVENOUS at 04:55

## 2021-06-11 RX ADMIN — HYDROMORPHONE HYDROCHLORIDE 0.2 MG: 0.2 INJECTION, SOLUTION INTRAMUSCULAR; INTRAVENOUS; SUBCUTANEOUS at 00:11

## 2021-06-11 RX ADMIN — HYDROMORPHONE HYDROCHLORIDE 0.2 MG: 0.2 INJECTION, SOLUTION INTRAMUSCULAR; INTRAVENOUS; SUBCUTANEOUS at 04:57

## 2021-06-11 RX ADMIN — HYDROMORPHONE HYDROCHLORIDE 0.2 MG: 0.2 INJECTION, SOLUTION INTRAMUSCULAR; INTRAVENOUS; SUBCUTANEOUS at 01:41

## 2021-06-11 RX ADMIN — FINASTERIDE 5 MG: 5 TABLET, FILM COATED ORAL at 08:40

## 2021-06-11 RX ADMIN — ENOXAPARIN SODIUM 30 MG: 30 INJECTION SUBCUTANEOUS at 18:51

## 2021-06-11 RX ADMIN — DUTASTERIDE 0.5 MG: 0.5 CAPSULE, LIQUID FILLED ORAL at 08:40

## 2021-06-11 RX ADMIN — CARVEDILOL 25 MG: 12.5 TABLET, FILM COATED ORAL at 08:42

## 2021-06-11 RX ADMIN — DUTASTERIDE 0.5 MG: 0.5 CAPSULE, LIQUID FILLED ORAL at 20:08

## 2021-06-11 RX ADMIN — BUSPIRONE HYDROCHLORIDE 10 MG: 10 TABLET ORAL at 22:20

## 2021-06-11 RX ADMIN — SODIUM CHLORIDE, POTASSIUM CHLORIDE, SODIUM LACTATE AND CALCIUM CHLORIDE: 600; 310; 30; 20 INJECTION, SOLUTION INTRAVENOUS at 13:16

## 2021-06-11 RX ADMIN — ESCITALOPRAM OXALATE 20 MG: 10 TABLET ORAL at 08:43

## 2021-06-11 RX ADMIN — ACETAMINOPHEN 1000 MG: 500 TABLET, FILM COATED ORAL at 16:36

## 2021-06-11 RX ADMIN — ACETAMINOPHEN 1000 MG: 500 TABLET, FILM COATED ORAL at 08:42

## 2021-06-11 RX ADMIN — ACETAMINOPHEN 1000 MG: 500 TABLET, FILM COATED ORAL at 12:12

## 2021-06-11 ASSESSMENT — ACTIVITIES OF DAILY LIVING (ADL)
ADLS_ACUITY_SCORE: 17

## 2021-06-11 NOTE — PROVIDER NOTIFICATION
"  Colorectal cross cover Dr. Velasquez paged via [facility] at 0625 regarding critical lab value.     Page stated, \"7C 7412-1 Radha Jaemson. YARIEL Critical lab value: Mag 0.8, no symptoms. RN driven protocol already ordered 4g with recheck tomorrow AM. Thanks Winifred MINOR [phone]\"    Mag 0.8. Pt already has RN protocol ordered. 4g of Mag ordered, awaiting pharmacist to verify medication. Recheck ordered for tomorrow morning.     Received call back at 7687: MD acknowledged, no further orders placed.   "

## 2021-06-11 NOTE — PLAN OF CARE
"  BP (!) 143/78 (BP Location: Left arm)   Pulse 70   Temp 98.7  F (37.1  C) (Temporal)   Resp 12   Ht 1.753 m (5' 9\")   Wt 76.2 kg (167 lb 15.9 oz)   SpO2 96%   BMI 24.81 kg/m      Time: 3360-6510.  Reason for admission: POD #0 s/p ureter stent placement, ileal pouch-anal anastomosis, and diverting loop ileostomy.    VS: VSS on 2L NC with O2 sats in high 90s. Afebrile. Capno WDL.   Activity: Up with assist x1, stood at the bedside, tolerated well. Calls appropriately.   Neuros: A&Ox4. Neuros intact. C/o abdominal/incisional pain managed with scheduled PO Tylenol, PRN IV dilaudid, and PRN PO Oxy.   Cardiac: Slight HTN, 140s/70s. HR stable in 70s. Denies chest pain.   Respiratory: LS clear. Denies SOB or LAWRENCE. Stable on 2L NC. Capno WDL. No cough noted. IS encouraged.   GI/: Valerio with pink output, cath cares completed. Ileostomy with small mucous green/brown output, -gas. Hypoactive BS. Denies nausea or vomiting.   Diet: Full liquid diet, tolerating ice water well.   Skin: x4 lap sites dermabonded closed, JANIE, no drainage noted. LLQ ISAAC with bright red output. RUQ ileostomy. Redness on upper back. Mepilex to coccyx.    Lines: Right PIV infusing LR at 125 mL/hr. Left PIV SL'd. Valerio, ISAAC, ileostomy.   Labs: K+ elevated at 5.7. Cr 2.53.     New changes this shift/Plan: Pt arrived from PACU around 2130. VSS on 2L NC, capno WDL, afebrile. Stood at the bedside, tolerated well. Pain well controlled on current regimen. A&Ox4. Valerio with pink output. ISAAC with bright red output. Ileostomy with gree/brown mucous. X4 lap sites dermabonded. 2 RN skin check completed, see note. Tolerating ice water. K+ elevated at 5.7, Cr 2.53.  Will continue to monitor & follow POC.     "

## 2021-06-11 NOTE — PLAN OF CARE
Admitted/transferred from: PACU at 2130.    2 RN full skin assessment completed by Winifred Robert, RN and Maggy Sanchez, 7C RN.   Skin assessment finding: issues found  x4 lap sites dermabonded, x1 LLQ ISAAC, RUQ ileostomy. Redness noted on upper back. Mepielx to coccyx.    Interventions/actions: skin interventions Mepilex to coccyx.      Will continue to monitor.

## 2021-06-11 NOTE — OP NOTE
Procedure Date: 06/10/2021.    PREOPERATIVE DIAGNOSES:  1.  Chronic ulcerative pancolitis refractory to medical therapy (status post total abdominal proctocolectomy with end ileostomy).  2.  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  3.  History of deep venous thrombosis.  4.  Chronic kidney disease (stage III).      POSTOPERATIVE DIAGNOSES:  1.  Chronic ulcerative pancolitis refractory to medical therapy (status post total abdominal proctocolectomy with end ileostomy).  2.  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  3.  History of deep venous thrombosis.  4.  Chronic kidney disease (stage III).    ANESTHESIA:  General endotracheal anesthesia plus bilateral TAP blocks.    PROCEDURES PERFORMED:  1.  Cystoscopy with bilateral ureteral stent insertion and injection of indocyanine green.  2.  Laparoscopic robotic assisted pelvic dissection with mobilization of rectal stump.  3.  Ileostomy takedown.  4.  Laparoscopic robotic assisted ileal pouch-anal anastomosis.  5.  Flexible pouchoscopy with CO2 insufflation.  6.  Diverting loop ileostomy.    ASSISTANT:  Uriel Hung MD.    ASSISTANTS:  Jeff Evans MD; and Cony Hu MD.    BRIEF HISTORY:  Radha is a 60-year-old pleasant lady with chronic ulcerative pancolitis refractory to medical management.  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  She underwent a total abdominal colectomy with end ileostomy in 10/2019.  Completion proctectomy was performed in 07/2020, but given the size and amount of rectal varices the ileal pouch was aborted.  She does not have any history of colon dysplasia or invasive carcinoma.  She did develop a postoperative DVT and was on warfarin, but on her clinic visit she was only on aspirin at that time.  Radha is an extremely fit individual and she  has been a weight  most of her life.  She does have stage III chronic kidney disease as one of diagnoses.  I evaluated her in clinic and performed a flexible sigmoidoscopy.  Her anorectal exam showed good sphincter tone and her rectal cuff measured approximately 4-5 cm from the anal verge.  There was a small rectocele.  There was no evidence of any active inflammation in the rectal stump.  She was off of all colitis medications or steroids.  I had a long discussion with her regarding the risks of performing ileal pouch anal anastomosis in the setting of extensive collateralization of her venous return in the abdomen.  We also discussed that even if we were able to construct a pouch that she would be at high risk for pouchitis and pouch varices with recurrent bleeding.  This would increase her likelihood of pouch failure.  This would also put her at a high risk for a vascular-mesenteric event and potential small bowel ischemia leading to a short gut syndrome and chronic TPN.  We also discussed that a reoperative pelvic operation can be associated with poor functional outcomes as well as anastomotic stricture.  She was very frustrated with her ileostomy and she had had it for approximately 2 years and this had made her severely depressed.  She also lives a very active lifestyle and would like to return to this, at least partially.  After discussing all of the risks, benefits, and alternatives of surgery we agreed to proceed with performing some abdominal imaging with an MRI as well as consultation with her nephrologist and hematologist and after getting all this information back, we decided to move forward with surgery.  I thoroughly discussed all these risks, benefits and alternatives once again and she agreed to proceed.    DESCRIPTION OF OPERATION:  After obtaining informed consent, the patient was brought to the operating room and placed in the modified lithotomy position.  General endotracheal anesthesia  "was gently induced without difficulty.  She underwent preoperative placement of bilateral TAP blocks.  She also received appropriate preoperative antibiotic prophylaxis as well as mechanical and chemical DVT prophylaxis.  Bilateral lower extremity pneumatic compression devices were applied, and all pressure points were cushioned.  Dr. Dixon performed cystoscopy with injection of both ureters with indocyanine green as well as bilateral insertion of ureteral stents.  Please see his operative report for full details.  After this, the abdomen was prepped and draped in the standard sterile fashion.  A \"timeout\" was performed.  A 12 mm upper midline incision was placed and the peritoneal cavity was entered under direct vision.  A 12 mm balloon tipped trocar was then placed at this incision and pneumoperitoneum was established up to 15 mmHg without difficulty.  The peritoneal cavity was then assessed with a 30-degree angle robotic laparoscope and there was no evidence of organ injury or bleeding.  There was extensive collateralization of her intraabdominal veins noted mainly at the omentum and in the pelvis.  Additional trocars were placed at the left abdomen (8 mm x 2); as well as the right abdomen (12 mm x 1 and 8 mm AirSeal trocar x 1).  There were minimal adhesions to the previous lower midline incision.  These were taken down with sharp dissection.  There were minimal adhesions around the right lower quadrant end ileostomy as well.  The da Kati Xi robot was docked and all robotic instruments were placed under direct visualization.  The uterus was adhesed to the sacral promontory and rectosigmoid mesentery.  This was dissected free with monopolar electrocautery and sharp dissection.  Both ureters were clearly identified and care was taken to not injure these structures during the pelvic dissection.  The uterus was then tented up to the abdominal wall with two 2-0 Ethibond sutures.  The rectal stump was severely " adhered to the posterior vagina as well as the pelvic sidewalls.  This was a very difficult dissection and required quite a bit of guidance with an EEA sizer as well as digital exams both in the rectum and vagina.  We were able to dissect the rectal stump free with care.  There was no evidence of any injuries to the rectal stump.  We confirmed this with flexible sigmoidoscopy and there was no evidence of any air leakage and the rectal stump measured approximately 4 cm.  After this, we reassessed all the circulation to the small intestine and there was no evidence of any bleeding in the pelvis.  We decided at that time that this would be appropriate to perform an ileal pouch-anal anastomosis.  All robotic instruments were removed and the da Kati Xi robot was undocked.  We then placed a circular incision around the right lower quadrant ileostomy and dissected it free all the way into the peritoneal cavity.  The ileum was then exteriorized through this incision and folded on itself.  We selected the segment of ileum that would reach the farthest down to the pubis.  The ileostomy was excised and the mesentery was transected at this site.  This was performed with a MISSY stapler (100 mm -- blue load).  We then folded the ileum on itself and placed an antimesenteric enterotomy.  The J-pouch was measured and this was approximately 15 cm in length.  The J-pouch was then constructed with 2 firings of a MISSY stapler (blue load -- 100 mm).  The tip of the J-pouch was oversewn with multiple 4-0 silk Lembert sutures.  The antimesenteric aspect of the J-pouch staple line was also reinforced with multiple 4-0 silk Lembert sutures.  The J-pouch appeared to be clearly viable and with absolutely no evidence of torsion or inadequate blood supply.  The J-pouch staple lines were then tested by insufflating it with Betadine solution and there was absolutely no leakage.  The blood supply to the J-pouch was visibly pulsatile.  A 2-0  pursestring suture was then placed at the antimesenteric enterotomy and a 25 mm EEA anvil was sutured in with the pursestring suture.  Care was taken to not incorporate any adjacent tissue at the enterotomy.  The J-pouch was then returned to the peritoneal cavity and a GelPort mini was placed at this incision.  Pneumoperitoneum was reestablished up to 15 mmHg.  Upon reinsufflation there was some venous bleeding from the posterior vagina and this was controlled with bipolar cautery.  Hemostasis was corroborated.  A 25 mm EEA stapler was then passed transanally to the top of the rectal pouch.  The post was then delivered and the anvil was connected to the post.  There was absolutely no evidence of excessive tension, torsion, or inadequate blood supply to the ileal pouch-anal anastomosis.  The post was then retrieved and the stapler was fired.  Care was taken to not incorporate any of the posterior vagina into the staple line.  This was assessed multiple times with vaginal exams.  Inspection of the anastomotic rings showed 2 intact anastomotic rings.  Flexible pouchoscopy was then repeated with a pediatric colonoscope and there was absolutely no evidence of air leakage upon hydropneumatic testing.  Furthermore, the entire pouch appeared to be healthy with no evidence of active bleeding from the staple lines.  The ileoanal anastomosis was also healthy appearing with no evidence of dehiscence or air leakage.  The colonoscope was then removed.  Hemostasis was corroborated.  The abdomen was irrigated with approximately 300 mL of warm sterile water and subsequently suctioned out.  The uteropexy sutures were removed.  A 19-[place] Maikol drain was placed through one of the left sided trocars and left in the pelvis.  This was fixed in place with a 2-0 nylon suture.  The omentum was then draped over the small intestine.  We did close the right lower quadrant 12 mm robotic trocar site at the fascial level with a figure-of-eight 0  Vicryl suture using a PMI device.  We then selected a segment of ileum approximately 40 cm proximal to the J-pouch for our diverting loop ileostomy.  Pneumoperitoneum was then desufflated and all trocars were removed under direct visualization.  The 12 mm upper midline incision was closed at the fascial level with a figure-of-eight 0 Vicryl suture.  Skin incisions were reapproximated with running 4-0 Monocryl subcuticular sutures and Exofin fusion.  All incisions were irrigated with dilute peroxide before closure.  The segment of ileum that was brought up to the previous Gelport mini site was oriented.  There was some arterial bleeding from the rectus muscle at this site and this was controlled with figure-of-eight 3-0 Vicryl sutures.  The ileum was matured as a loop ileostomy with multiple 3-0 Vicryl sutures.  A sterile stoma appliance was then placed.  Both ureteral catheters were removed at the end of the operation.  The patient tolerated the procedure well.    COMPLICATIONS:  None immediately.    ESTIMATED BLOOD LOSS:  200 mL    REPLACEMENT:  2100 mL of crystalloid.    DRAINS/TUBES:  Valerio catheter and 19-[place] Maikol drain in the pelvis.    SPECIMENS:  None.    FINDINGS:  A difficult pelvic dissection given her previous proctectomy as well as multiple pelvic varices.  A 25 mm EEA ileoanal anastomosis with no evidence of air leakage.  Anastomosis was located approximately 4 cm from the anal verge.    DISPOSITION:  PACU.      Uriel Hung MD        D: 06/10/2021   T: 06/10/2021   MT: [facility]    Name:     SCOTT MATUTE  MRN:      [id number]        Account:        [id number]   :      1960           Procedure Date: 06/10/2021     Document: [id number]    cc:  MD Hair Mir MD Diaa Osman, D.O. Jennifer L. Hunt, MD

## 2021-06-11 NOTE — PLAN OF CARE
PT 7C: Defer - PT orders acknowledged and appreciated. Pt mobilizing SBA and will only require 1 discipline to follow for IP stay. Please defer to OT for updated discharge recs as appropriate. Will complete orders. Thank you for your referral.

## 2021-06-11 NOTE — CONSULTS
"  WO Nurse Inpatient [facility]   WO Nurse Inpatient Adult     Initial Assessment   Assessment of new diverting loop Ileostomy Stoma complication(s) none   Mucocutaneous junction; intact   Peristomal complication(s) none - not visualized barrier in place  Pouch wear time:less than 24 hours  Following today's visit:Patient /  is  able to demonstrate;       1. How to empty their pouch? yes      2. How to change their pouch?  yes      3. How to read and record intake and output correctly? yes    Objective data:  Patient history according to medical record: This is a 60 year old female with congenitally absent IVC and history of multiple DVT and refractory ulcerative colitis s/p total abdominal colectomy with end ileostomy followed by completion proctectomy now s/p IPAA with diverting loop ileostomy on 6/10. She is tolerating liquids, ambulating, and her pain is controlled with pain medications.  Current Diet/Nutrition: Orders Placed This Encounter      Full Liquid Diet     TPN no   I/O last 3 completed shifts:  In: 2510 [P.O.:310; I.V.:2200]  Out: 1330 [Urine:910; Drains:220; Blood:200]  Labs:    Recent Labs   Lab 06/11/21  0638   HGB 13.3        Physical Exam:  Current pouching system:[place] and 2pc flat CTF fro surgery wihout any tape boarder - pt not willing to change the pouch as it was placed yesterday  Reason for pouch change today: pouch not changed today  Stoma appearance: viable, healthy, normal-appearing, moist, good turgor, edematous and protruberant  Stoma size;   approx 1 1/4\"  Peristomal skin: not visualized (barrier in place)  Stoma output :green   Abdominal  Assessment  soft , NG still in place? No  Surgical Site: open to air  Pain: Dull ache  Is patient still on a PCA No    Interventions:  Patient's chart evaluated.  Focus of today's visit: verbal instruction , diet and hydration , pouch emptying, output measurement and pt knowledgable on ostom management. Prefers to use coloplast " pouches and has plenty left at home. Went over stoma measurement, who to contact with issues and signs to watch for, perianal skin cares. Appropriate supplies given  Participant of teaching session today patient   Orders: Reviewed and Written  Change made with ostomy management today: No  Patient/family: active participation  Supplies:Gathered and at bedside    Plan:  Learning needs: None  Preparation for discharge: None needed  Recommend home care? no    Discussed plan of care with Patient and Nurse  Nursing to notify the Provider(s) and re-consult the WOC Nurse if new ostomy concerns or discharge planned before next planned WOC visit.    WOC Nurse will return: No planned visit schedule please call with questions- RN to page WOC if tati Srivastava RN

## 2021-06-11 NOTE — PROGRESS NOTES
Took over cares of pt @ 2300. Pt is AxO. VSS on 2L NC, using IS when awake. Pain controled with Tylenol, Oxy and Dilaudid. Valerio is patent with adequate pink UOP. ISAAC drain with red/bloody output. Ileostomy has had significant amount of gas and has small amount of green output. Pt is tolerating fluids, denies nausea. 4 lap sites all are CDI and open to air. Magnesium replaced and recheck in place for this am. Pt ambulated in room this am from bed to door with Ax1, denies dizziness or lightheadedness, tolerated well.

## 2021-06-11 NOTE — PROGRESS NOTES
CLINICAL NUTRITION SERVICES  Reason for Assessment:  Nutrition education regarding low fiber/low residue diet education  Diet History:  Patient was on a low fiber diet in the past after previous surgery, would like a review today  Nutrition Diagnosis:  Food- and nutrition-related knowledge deficit r/t requiring review of low fiber diet as evidenced by patient report  Interventions:  Provided instruction on low fiber diet. Discussed each food group and foods to eat and avoid. Answered patient's questions regarding diet.   Provided handouts: Fiber-Restricted Nutrition Therapy and Low Fiber Diet Hospital Menu  Goals:   Patient will verbalize at least 5 low fiber foods acceptable on diet and 5 high fiber foods to avoid.  Follow-up:    Patient to ask any further nutrition-related questions before discharge.  In addition, pt may request outpatient RD appointment.    Marley Alcocer RD, LD  Pager: [phone]  Units covered: 7C (all beds) and 5A (beds 8766 through 5219-2)

## 2021-06-11 NOTE — PROGRESS NOTES
06/11/21 0907   Quick Adds   Type of Visit Initial Occupational Therapy Evaluation   Living Environment   People in home spouse   Current Living Arrangements house   Home Accessibility stairs to enter home   Number of Stairs, Main Entrance 2   Transportation Anticipated family or friend will provide   Living Environment Comments spouse retired. Pt works from home. IND at baseline. walk in shower and tub shower.    Self-Care   Usual Activity Tolerance excellent   Current Activity Tolerance moderate   Regular Exercise Yes   Activity/Exercise Type   (strengthening)   Exercise Amount/Frequency 3-5 times/wk   Equipment Currently Used at Home none   Activity/Exercise/Self-Care Comment lifts 3x/wk for 1 hour.    Disability/Function   Hearing Difficulty or Deaf no   Wear Glasses or Blind yes   Vision Management readers   Concentrating, Remembering or Making Decisions Difficulty no   Difficulty Communicating no   Difficulty Eating/Swallowing no   Walking or Climbing Stairs Difficulty no   Dressing/Bathing Difficulty no   Toileting issues no   Doing Errands Independently Difficulty (such as shopping) no   Fall history within last six months no   Change in Functional Status Since Onset of Current Illness/Injury no   General Information   Onset of Illness/Injury or Date of Surgery 06/10/21   Referring Physician Abhilash   Patient/Family Therapy Goal Statement (OT) return home with spouse A    Additional Occupational Profile Info/Pertinent History of Current Problem This is a 60 year old female with congenitally absent IVC and history of multiple DVT and refractory ulcerative colitis s/p total abdominal colectomy with end ileostomy followed by completion proctectomy now s/p IPAA with diverting loop ileostomy on 6/10.   Existing Precautions/Restrictions abdominal;fall   Cognitive Status Examination   Orientation Status orientation to person, place and time   Affect/Mental Status (Cognitive) WNL   Follows Commands WNL   Visual  Perception   Visual Impairment/Limitations WNL   Sensory   Sensory Quick Adds No deficits were identified   Pain Assessment   Patient Currently in Pain Yes, see Vital Sign flowsheet   Integumentary/Edema   Integumentary/Edema no deficits were identifed   Posture   Posture not impaired   Range of Motion Comprehensive   General Range of Motion no range of motion deficits identified   Strength Comprehensive (MMT)   General Manual Muscle Testing (MMT) Assessment no strength deficits identified   Muscle Tone Assessment   Muscle Tone Quick Adds No deficits were identified   Coordination   Upper Extremity Coordination No deficits were identified   Instrumental Activities of Daily Living (IADL)   Previous Responsibilities shopping;laundry;housekeeping;meal prep;medication management;finances;driving;work   IADL Comments works from home. Spouse able to A with IADLs as needed.    Clinical Impression   Criteria for Skilled Therapeutic Interventions Met (OT) yes   OT Diagnosis dec IND with ADL/IADLs and transfers   OT Problem List-Impairments impacting ADL activity tolerance impaired;pain;post-surgical precautions   Assessment of Occupational Performance 3-5 Performance Deficits   Identified Performance Deficits LE dressing, bed mobility, amb, stairs, g/h    Planned Therapy Interventions (OT) ADL retraining;IADL retraining;transfer training;home program guidelines   Clinical Decision Making Complexity (OT) low complexity   Therapy Frequency (OT) 6x/week   Predicted Duration of Therapy 7 days   Risk & Benefits of therapy have been explained care plan/treatment goals reviewed;evaluation/treatment results reviewed;risks/benefits reviewed;current/potential barriers reviewed;patient   OT Discharge Planning    OT Discharge Recommendation (DC Rec) Home with assist   OT Rationale for DC Rec anticipate pt will be safe to d/c to home with spouse A for IADLs as needed. Pt mobilizing well on POD 1, SBA and no AE.   OT Brief overview of  current status  SBA    Total Evaluation Time (Minutes)   Total Evaluation Time (Minutes) 4

## 2021-06-11 NOTE — PROGRESS NOTES
Pt is A&Ox4. VSS. Pt using IS independently. Pt reports a decrease in pain, controlled with Tylenol, Dilaudid and Oxycodone. Valerio catheter in place with pink urine output. Pt. Passing gas into ileostomy bag, small amount of stool in ileostomy bag. Pt on full liquid diet tolerating well. Denies nausea. Pt. Has 4 lap sites open to air and CDI. Pt ambulated in room and to bathroom independently. Denies dizziness, numbness or tingling or lightheadedness.

## 2021-06-11 NOTE — PHARMACY-ADMISSION MEDICATION HISTORY
Admission Medication History Completed by Pharmacy    See [facility] Admission Navigator for allergy information, preferred outpatient pharmacy, prior to admission medications and immunization status.     Medication History Sources:     Chart, dispense report, patient    Changes made to PTA medication list (reason):    Added: None    Deleted: None    Changed: unknown strength of biotin    Additional Information:    None    Prior to Admission medications    Medication Sig Last Dose Taking? Auth Provider   aspirin 81 MG EC tablet Take 81 mg by mouth daily Past Week at Unknown time Yes Unknown, Entered By History   augmented betamethasone dipropionate (DIPROLENE) 0.05 % external lotion APPLY TO THE AFFECTED AREA OF SCALP TWICE DAILY UNTIL IMPROVED 6/9/2021 at Unknown time Yes Reported, Patient   busPIRone (BUSPAR) 5 MG tablet Take 5 mg in the morning  Take 5 mg in the afternoon and   Take 10 mg (2 tablets) by mouth at bedtime. 6/10/2021 at 0800 Yes Reported, Patient   carvedilol (COREG) 25 MG tablet Take 25 mg by mouth 2 times daily (with meals)  6/10/2021 at 0800 Yes Reported, Patient   cholecalciferol 25 MCG (1000 UT) TABS Take 1,000 Units by mouth daily Past Week at Unknown time Yes Unknown, Entered By History   dutasteride (AVODART) 0.5 MG capsule Take 0.5 mg by mouth 2 times daily  6/10/2021 at 0800 Yes Reported, Patient   escitalopram (LEXAPRO) 20 MG tablet Take 20 mg by mouth every morning 6/10/2021 at 0800 Yes Reported, Patient   finasteride (PROSCAR) 5 MG tablet Take 5 mg by mouth daily  6/10/2021 at 0800 Yes Reported, Patient   nortriptyline (PAMELOR) 25 MG capsule Take 25 mg by mouth At Bedtime  Past Week at Unknown time Yes Reported, Patient   BIOTIN PO Take 1 tablet by mouth daily Unknown strength More than a month at Unknown time  Unknown, Entered By History   EPINEPHrine (ANY BX GENERIC EQUIV) 0.3 MG/0.3ML injection 2-pack INJECT 0.3 ML INTO THE MUSCLE ONE TIME PRF ALLERGIC REACTION Unknown at Unknown time   Reported, Patient   ketoconazole (NIZORAL) 2 % external shampoo APPLY TO AFFECTED AREAS ON SCALP ONCE EVERY OTHER WEEK AND THEN WASH OFF More than a month at Unknown time  Reported, Patient       Date completed: 06/11/21    Medication history completed by: Janee Franks [facility]

## 2021-06-11 NOTE — PROGRESS NOTES
Colorectal Surgery Progress Note  [facility]  POD#1      Subjective:  Magnesium low to 0.8, replaced with 4g Mg sulfate. Tolerating liquids, no nausea or vomiting. Ambulating. Pain tolerable.     Vitals:  Vitals:    06/10/21 2345 06/11/21 0000 06/11/21 0017 06/11/21 0318   BP: 116/86 106/55 111/57    BP Location: Left arm Left arm Left arm    Pulse: 68 68 71    Resp:       Temp:       TempSrc:       SpO2:    96%   Weight:       Height:         I/O:  I/O last 3 completed shifts:  In: 2260 [P.O.:60; I.V.:2200]  Out: 630 [Urine:310; Drains:120; Blood:200]    Physical Exam:  Gen: AAOx3, NAD  Pulm: Non-labored breathing  Abd: Soft, non-distended, appropriately tender, no guarding   Incisions C/D/I    Stoma pink and viable with old blood in bag.    ISAAC drain with serosanguinous output  : Valerio in place with light cherry colored urine, expected since s/p stenting  Ext:  Warm and well-perfused    BMP  Recent Labs   Lab 06/10/21  2149 06/10/21  1952 06/10/21  1102   NA  --  136  --    POTASSIUM 5.7* 5.8* 5.1   CHLORIDE  --  104  --    CO2  --  27  --    BUN  --  26  --    CR  --  2.53* 2.37*   GLC  --  118*  --    MAG 0.8*  --   --    PHOS 4.3  --   --      CBC  Recent Labs   Lab 06/10/21  1952   HGB 13.8         ASSESSMENT: This is a 60 year old female with congenitally absent IVC and history of multiple DVT and refractory ulcerative colitis s/p total abdominal colectomy with end ileostomy followed by completion proctectomy now s/p IPAA with diverting loop ileostomy on 6/10. She is tolerating liquids, ambulating, and her pain is controlled with pain medications. Continue full liquid diet and follow labs today.     Neuro/Pain: controlled with scheduled Tylenol, prn hydromorphone, oxycodone  CV: on home carvedilol  PULM: encourage IS.  GI/FEN: Full liquid diet  : Valerio in place with slightly sanguinous urine output, will keep Valerio in today   Heme: congenitally absent IVC and history of DVT,  on ppx anticoagulation   ID: 1g ertapenem ppx today  Other: /hr  Lines/Drains: Valerio, ileostomy, Maikol drain LLQ, PIV  Activity: as tolerated.  Ppx: enoxaparin 30mg daily  Dispo: home in 2-3 days    Corinne Praska, MS4    Patient was seen and discussed with Dr. Evans who will discuss with staff.      Addendum:  Pt seen and examined independent of medical student.   Up in bed.  Hugging pillow.  Doing ok. No nausea.   NAD  abd soft, mildly distended.    Stoma pink and viable with old blood in bag  ISAAC drain serosang  Valerio light cherry colored urine, expected.     Hgb 13.3 from 13.8. (baseline 13-14)  Creat 2.67 (baseline 2.3-2.6)  Potassium 5.3 from 5.7.    Agree with note above.   - FLD as tolerated  - continue IVF, UOP picking up  - replete e-lytes  - WOCN for ileo teaching  Ppx:  lovenox 30mg per hematologist due to CKD.   Dispo:  Likely early next week, Mon-Tues.  New ileo, lovenox 30mg daily for 30 days, likely will remove ISAAC drain prior to discharge but still to be determined.      Diann Kennedy PA-C  Colon and Rectal Surgery     Additional diagnosis of hypomagnesemia treated with IV magnesium replacement.    Jeff Evans MD   CRS Fellow

## 2021-06-11 NOTE — ANESTHESIA POSTPROCEDURE EVALUATION
Patient: Radha COSTA Stalcar    Procedure(s):  INSERTION, STENT, URETER, PREOPERATIVE  ROBOT-ASSISTED  ileal pouch-anal anastomosis, diverting loop ileostomy  SIGMOIDOSCOPY, FLEXIBLE    Diagnosis:Ulcerative pancolitis with complication (H) [K51.019]  Diagnosis Additional Information: No value filed.    Anesthesia Type:  General    Note:  Disposition: Admission   Postop Pain Control: Uneventful            Sign Out: Well controlled pain   PONV: No   Neuro/Psych: Uneventful            Sign Out: Acceptable/Baseline neuro status   Airway/Respiratory: Uneventful            Sign Out: Acceptable/Baseline resp. status   CV/Hemodynamics: Uneventful            Sign Out: Acceptable CV status; No obvious hypovolemia; No obvious fluid overload   Other NRE: NONE   DID A NON-ROUTINE EVENT OCCUR? No           Last vitals:  Vitals:    06/10/21 1930 06/10/21 1945 06/10/21 2000   BP: (!) 136/97 132/88    Pulse: 57 66    Resp: 15 12 12   Temp: 36.5  C (97.7  F)     SpO2: 99% 96%        Last vitals prior to Anesthesia Care Transfer:  CRNA VITALS  6/10/2021 1806 - 6/10/2021 1906      6/10/2021             NIBP:  (!) 120/101    Ht Rate:  56    SpO2:  100 %          Electronically Signed By: Scar Liu MD  Delilah 10, 2021  8:15 PM

## 2021-06-11 NOTE — ADDENDUM NOTE
Addendum  created 06/11/21 1021 by Danna Paz MD    Clinical Note Signed, Diagnosis association updated, Intraprocedure Blocks edited

## 2021-06-11 NOTE — CONSULTS
SW consulted for discharge planning.  Chart reviewed and discussed during MDRs.  OT is recommending home with assist.  No SW needs identified, consulted closed.  Please page SW or place a new consult if needs arise.    LEONARDO Acosta, [facility]  Phone:  [phone]   Pager:  [phone]

## 2021-06-11 NOTE — OR NURSING
Phlebotomist at bedside [phone] attempting to draw blood from patient.  Sample obtained at 1955, MD will be paged with results as requested prior to transfer to .

## 2021-06-11 NOTE — PLAN OF CARE
VSS except temp max at 100.4F/temporal and 98.2F/oral-MD notified, will monitor.   Neuros intact.  LS clear-IS done.  +BS, +flatus/ watery stool in ostomy.  Younger with adeq uop, urine still pinkish-younger to stay.  Tolerating Full liqs.  UAL/SBA with activitties-walked with family in hallways.  Pain controlled with prn oxycodone, sched tylenol.  Abd incision with intact derma bond.  ISAAC with serosang drainage.  PIV infusing, started with IV meds.  Continue with POC.

## 2021-06-11 NOTE — PROGRESS NOTES
"Post Op Check    06/10/2021    Radha Jameson is a 60 year old female with h/o congenitally absent IVC, ulcerative colitis s/p total abdominal colectomy with j pouch (no anastomosis) in 2019 now POD#0 s/p robot-assisted ileal pouch anal anastomosis and loop ileosotmy.    Pt reports she is doing well. Pain well controlled. Tolerating clears. Denies SOB, chest pain, or dizziness.    BP (!) 140/69 (BP Location: Left arm)   Pulse 72   Temp 98.7  F (37.1  C) (Temporal)   Resp 13   Ht 1.753 m (5' 9\")   Wt 76.2 kg (167 lb 15.9 oz)   SpO2 96%   BMI 24.81 kg/m      Gen: A&O x3, NAD, resting comfortably in bed  Chest: breathing non-labored on room air  Abdomen: soft, non-distended, minimally tender, incisions c/d/i, pelvic drain with serosanguinous output. Ileostomy pink, patent, small amount of gas and green output in ostomy bag.  Extremities: warm and well perfused    A/P: No acute post-op issues. Continue plan of care per primary team. Please call with any questions.    Jose A Velasquez MD  Surgery resident  [phone]  "

## 2021-06-12 ENCOUNTER — APPOINTMENT (OUTPATIENT)
Dept: OCCUPATIONAL THERAPY | Facility: CLINIC | Age: 61
DRG: 330 | End: 2021-06-12
Attending: UROLOGY
Payer: COMMERCIAL

## 2021-06-12 LAB
ANION GAP SERPL CALCULATED.3IONS-SCNC: 4 MMOL/L (ref 3–14)
BUN SERPL-MCNC: 34 MG/DL (ref 7–30)
CA-I SERPL ISE-MCNC: 4.8 MG/DL (ref 4.4–5.2)
CALCIUM SERPL-MCNC: 9 MG/DL (ref 8.5–10.1)
CHLORIDE SERPL-SCNC: 104 MMOL/L (ref 94–109)
CO2 SERPL-SCNC: 28 MMOL/L (ref 20–32)
CREAT SERPL-MCNC: 2.85 MG/DL (ref 0.52–1.04)
GFR SERPL CREATININE-BSD FRML MDRD: 17 ML/MIN/{1.73_M2}
GLUCOSE SERPL-MCNC: 88 MG/DL (ref 70–99)
POTASSIUM SERPL-SCNC: 4.5 MMOL/L (ref 3.4–5.3)
SODIUM SERPL-SCNC: 136 MMOL/L (ref 133–144)

## 2021-06-12 PROCEDURE — 250N000013 HC RX MED GY IP 250 OP 250 PS 637: Performed by: COLON & RECTAL SURGERY

## 2021-06-12 PROCEDURE — 97530 THERAPEUTIC ACTIVITIES: CPT | Mod: GO

## 2021-06-12 PROCEDURE — 258N000003 HC RX IP 258 OP 636: Performed by: PHYSICIAN ASSISTANT

## 2021-06-12 PROCEDURE — 82330 ASSAY OF CALCIUM: CPT | Performed by: COLON & RECTAL SURGERY

## 2021-06-12 PROCEDURE — 36415 COLL VENOUS BLD VENIPUNCTURE: CPT | Performed by: COLON & RECTAL SURGERY

## 2021-06-12 PROCEDURE — 250N000011 HC RX IP 250 OP 636: Performed by: COLON & RECTAL SURGERY

## 2021-06-12 PROCEDURE — 97535 SELF CARE MNGMENT TRAINING: CPT | Mod: GO

## 2021-06-12 PROCEDURE — 120N000002 HC R&B MED SURG/OB UMMC

## 2021-06-12 PROCEDURE — 80048 BASIC METABOLIC PNL TOTAL CA: CPT | Performed by: COLON & RECTAL SURGERY

## 2021-06-12 RX ADMIN — SODIUM CHLORIDE, POTASSIUM CHLORIDE, SODIUM LACTATE AND CALCIUM CHLORIDE: 600; 310; 30; 20 INJECTION, SOLUTION INTRAVENOUS at 03:30

## 2021-06-12 RX ADMIN — SODIUM CHLORIDE, POTASSIUM CHLORIDE, SODIUM LACTATE AND CALCIUM CHLORIDE: 600; 310; 30; 20 INJECTION, SOLUTION INTRAVENOUS at 17:11

## 2021-06-12 RX ADMIN — ENOXAPARIN SODIUM 30 MG: 30 INJECTION SUBCUTANEOUS at 18:23

## 2021-06-12 RX ADMIN — CARVEDILOL 25 MG: 12.5 TABLET, FILM COATED ORAL at 18:23

## 2021-06-12 RX ADMIN — DUTASTERIDE 0.5 MG: 0.5 CAPSULE, LIQUID FILLED ORAL at 08:12

## 2021-06-12 RX ADMIN — BUSPIRONE HYDROCHLORIDE 5 MG: 5 TABLET ORAL at 08:12

## 2021-06-12 RX ADMIN — DUTASTERIDE 0.5 MG: 0.5 CAPSULE, LIQUID FILLED ORAL at 20:20

## 2021-06-12 RX ADMIN — BUSPIRONE HYDROCHLORIDE 10 MG: 10 TABLET ORAL at 22:23

## 2021-06-12 RX ADMIN — CARVEDILOL 25 MG: 12.5 TABLET, FILM COATED ORAL at 08:12

## 2021-06-12 RX ADMIN — FINASTERIDE 5 MG: 5 TABLET, FILM COATED ORAL at 08:12

## 2021-06-12 RX ADMIN — ACETAMINOPHEN 1000 MG: 500 TABLET, FILM COATED ORAL at 20:20

## 2021-06-12 RX ADMIN — BUSPIRONE HYDROCHLORIDE 5 MG: 5 TABLET ORAL at 14:24

## 2021-06-12 RX ADMIN — ESCITALOPRAM OXALATE 20 MG: 10 TABLET ORAL at 08:12

## 2021-06-12 RX ADMIN — ACETAMINOPHEN 1000 MG: 500 TABLET, FILM COATED ORAL at 16:29

## 2021-06-12 RX ADMIN — ACETAMINOPHEN 1000 MG: 500 TABLET, FILM COATED ORAL at 12:13

## 2021-06-12 RX ADMIN — ACETAMINOPHEN 1000 MG: 500 TABLET, FILM COATED ORAL at 08:12

## 2021-06-12 ASSESSMENT — ACTIVITIES OF DAILY LIVING (ADL)
ADLS_ACUITY_SCORE: 17

## 2021-06-12 ASSESSMENT — MIFFLIN-ST. JEOR: SCORE: 1440.42

## 2021-06-12 NOTE — PLAN OF CARE
Occupational Therapy Discharge Summary    Reason for therapy discharge:    All goals and outcomes met, no further needs identified.    Progress towards therapy goal(s). See goals on Care Plan in [facility] electronic health record for goal details.  Goals met    Therapy recommendation(s):    No further therapy is recommended.

## 2021-06-12 NOTE — PLAN OF CARE
VSS   Neuros intact.  LS clear, infrequent non-productive cough-IS encouraged.  +BS, +flatus and dark green to maroon-colored loose stool in ostomy, MD aware.  Voiding spontaneously after younger was removed.  Tolerating low fiber diet.  UAL with activity.  Pain controlled with sched tylenol.  Abd incision with intact derma bond.  ISAAC with serosang drainage.  PIV infusing  Continue with POC

## 2021-06-12 NOTE — PLAN OF CARE
VSS. Highest Temp: 100.4 (temporal), MD notified, will monitor. Valerio patent with pink urine output, adequate amount. Ileostomy with moderate output, brown/black. Pain managed with scheduled tylenol and prn oxycodone x1 this shift. ISAAC drain with bloody/red moderate output, changed dressing @ 2230. Tolerating full liquid diet. 4 abdominal lap sites liquid bandage, open to air. Ambulated in [place] x1, independent. L PIV infusing LR. Continue plan of care.

## 2021-06-12 NOTE — PROGRESS NOTES
Took over cares of pt @ 2300. Pt is AxO. VSS on RA. Up ad mayur in room and walking halls. Valerio in place, patent and having adequate UOP that is sergio and clear. No output from ISAAC overnight. Pt denies pain or nausea. Tolerating FLD. Lap sites are CDI and JANIE. PIV with IVF. Continue POC.

## 2021-06-13 PROCEDURE — 250N000013 HC RX MED GY IP 250 OP 250 PS 637: Performed by: COLON & RECTAL SURGERY

## 2021-06-13 PROCEDURE — 250N000011 HC RX IP 250 OP 636: Performed by: COLON & RECTAL SURGERY

## 2021-06-13 PROCEDURE — 120N000002 HC R&B MED SURG/OB UMMC

## 2021-06-13 PROCEDURE — 250N000013 HC RX MED GY IP 250 OP 250 PS 637: Performed by: STUDENT IN AN ORGANIZED HEALTH CARE EDUCATION/TRAINING PROGRAM

## 2021-06-13 PROCEDURE — 258N000003 HC RX IP 258 OP 636: Performed by: PHYSICIAN ASSISTANT

## 2021-06-13 RX ORDER — PSYLLIUM SEED (WITH DEXTROSE)
2 POWDER (GRAM) ORAL 2 TIMES DAILY
Status: DISCONTINUED | OUTPATIENT
Start: 2021-06-13 | End: 2021-06-14 | Stop reason: HOSPADM

## 2021-06-13 RX ORDER — LOPERAMIDE HCL 1 MG/7.5ML
2 SUSPENSION ORAL 2 TIMES DAILY
Status: DISCONTINUED | OUTPATIENT
Start: 2021-06-13 | End: 2021-06-14

## 2021-06-13 RX ADMIN — ACETAMINOPHEN 1000 MG: 500 TABLET, FILM COATED ORAL at 12:05

## 2021-06-13 RX ADMIN — ENOXAPARIN SODIUM 30 MG: 30 INJECTION SUBCUTANEOUS at 18:11

## 2021-06-13 RX ADMIN — BUSPIRONE HYDROCHLORIDE 5 MG: 5 TABLET ORAL at 13:29

## 2021-06-13 RX ADMIN — DUTASTERIDE 0.5 MG: 0.5 CAPSULE, LIQUID FILLED ORAL at 08:11

## 2021-06-13 RX ADMIN — BUSPIRONE HYDROCHLORIDE 5 MG: 5 TABLET ORAL at 08:07

## 2021-06-13 RX ADMIN — LOPERAMIDE HCL 2 MG: 1 SOLUTION ORAL at 09:20

## 2021-06-13 RX ADMIN — CARVEDILOL 25 MG: 12.5 TABLET, FILM COATED ORAL at 08:11

## 2021-06-13 RX ADMIN — CARVEDILOL 25 MG: 12.5 TABLET, FILM COATED ORAL at 18:04

## 2021-06-13 RX ADMIN — OXYCODONE HYDROCHLORIDE 5 MG: 5 TABLET ORAL at 04:10

## 2021-06-13 RX ADMIN — Medication 2 WAFER: at 20:55

## 2021-06-13 RX ADMIN — ACETAMINOPHEN 1000 MG: 500 TABLET, FILM COATED ORAL at 20:47

## 2021-06-13 RX ADMIN — BUSPIRONE HYDROCHLORIDE 10 MG: 10 TABLET ORAL at 22:26

## 2021-06-13 RX ADMIN — ACETAMINOPHEN 1000 MG: 500 TABLET, FILM COATED ORAL at 08:08

## 2021-06-13 RX ADMIN — LOPERAMIDE HCL 2 MG: 1 SOLUTION ORAL at 20:47

## 2021-06-13 RX ADMIN — DUTASTERIDE 0.5 MG: 0.5 CAPSULE, LIQUID FILLED ORAL at 20:55

## 2021-06-13 RX ADMIN — ACETAMINOPHEN 1000 MG: 500 TABLET, FILM COATED ORAL at 16:31

## 2021-06-13 RX ADMIN — Medication 2 WAFER: at 09:20

## 2021-06-13 RX ADMIN — ESCITALOPRAM OXALATE 20 MG: 10 TABLET ORAL at 08:08

## 2021-06-13 RX ADMIN — SODIUM CHLORIDE, POTASSIUM CHLORIDE, SODIUM LACTATE AND CALCIUM CHLORIDE: 600; 310; 30; 20 INJECTION, SOLUTION INTRAVENOUS at 04:15

## 2021-06-13 RX ADMIN — FINASTERIDE 5 MG: 5 TABLET, FILM COATED ORAL at 08:11

## 2021-06-13 ASSESSMENT — ACTIVITIES OF DAILY LIVING (ADL)
ADLS_ACUITY_SCORE: 17
ADLS_ACUITY_SCORE: 15
ADLS_ACUITY_SCORE: 17
ADLS_ACUITY_SCORE: 15

## 2021-06-13 NOTE — PLAN OF CARE
Patient is emptying her ileostomy, loose brown stool and gas in bag. ISAAC to bulb suction, leaks a bit at insertion site, dressing changed once. Voiding good amounts, tolerating a low fiber diet. Mild pain managed with scheduled Tylenol. Up ad mayur, ambulates independently in halls. BP slightly elevated, is getting scheduled Coreg.

## 2021-06-13 NOTE — PLAN OF CARE
"  Plan of Care Note    Reason for Admission: Ulcerative pancolitis with complication, Procedure(s):  INSERTION, STENT, URETER, PREOPERATIVE  ROBOT-ASSISTED  ileal pouch-anal anastomosis, diverting loop ileostomy  SIGMOIDOSCOPY, FLEXIBLE  Access Lines: MOLLY PIV infusing LR @ 75 ML/HR  Incisions/Drains: LLQ ISAAC drain, Abd Incision liquid bandage  VS: /69 (BP Location: Right arm)   Pulse 61   Temp 98.8  F (37.1  C) (Temporal)   Resp 18   Ht 1.753 m (5' 9\")   Wt 80.6 kg (177 lb 11.2 oz)   SpO2 93%   BMI 26.24 kg/m    Diet: Low Fiber  Activity: Independent   Pain Management: Pain managed with Tylenol, and Oxycodone  GI/: Ileostomy with adequate output and voiding adequately   Neuro: A&Ox4  Team: Colorectal  Pertinent Labs: None      Shift Summary  Patient sleeping most of shift. Pt up to the bathroom x 3 on shift. ISAAC drain with 185 ML out on shift. Pt up for a walk in am. Continue with POC.      "

## 2021-06-13 NOTE — PROGRESS NOTES
"Colorectal Surgery Progress Note    Subjective: No acute events. Pt tolerating low fiber diet. Ostomy with thicker output. Ambulating around the unit. Voided spontaneously after Valerio removed. Pain well controlled. No other complaints    Objective:   /69 (BP Location: Right arm)   Pulse 61   Temp 98.8  F (37.1  C) (Temporal)   Resp 18   Ht 1.753 m (5' 9\")   Wt 80.6 kg (177 lb 11.2 oz)   SpO2 93%   BMI 26.24 kg/m      PE:  Gen: no distress, sitting up in bed  CV: Regular rate  Resp: non-labored breathing on RA  Abd: Soft, non-tender, minimally distended, ostomy pink, patent with thick brown stool and gas in bag, drain serosanguinous  Ext: warm and well perfused  Incision: c/d/i       Intake/Output Summary (Last 24 hours) at 6/13/2021 0648  Last data filed at 6/13/2021 0521  Gross per 24 hour   Intake 3960 ml   Output 3400 ml   Net 560 ml       No new labs    A/P: 60F hx congenitally absent IVC, DVTs, refractory UC s/p TAC-EI followed by completion proctectomy now s/p IPAA with DLI 6/10. Doing well postoperatively tolerating diet, voiding independently, and ambulating. Drain with continued high output; will monitor and potentially pull prior to discharge. Anticipate home tomorrow.     - Discontinue IV fluids; encourage good PO fluid intake  - Low fiber diet  - Start Metamucil 2 wafers BID, Imodium 2mg BID  - Keep drain d/t high outputs  - BMP tomorrow  - Lovenox  - Dispo: home tomorrow, lovenox 30d, +/- drain    Seen and discussed with fellow, Dr. Kimble, who will discuss with staff.    Toney Amezcua MD  Surgery PGY-1    "

## 2021-06-13 NOTE — PROGRESS NOTES
Pt. Is A&Ox4. VSS. Pt. Is up independently in room and hallway frequently; resting comfortably in bed now.  Pt. Voiding spontaneously. Pt. Is passing gas into ileostomy bag, medium output of stool in ileostomy bag. Pt. On low fiber diet, tolerating well, denies any nausea. Pt. States pain has come down to 2/10; treating with Tylenol. Lap sites are CDI. Pt. Is saline locked. Denies any dizziness, numbness or tingling. Pt. States she would like to go home as soon as possible.

## 2021-06-14 VITALS
TEMPERATURE: 98.8 F | OXYGEN SATURATION: 97 % | SYSTOLIC BLOOD PRESSURE: 158 MMHG | HEART RATE: 67 BPM | BODY MASS INDEX: 26.32 KG/M2 | DIASTOLIC BLOOD PRESSURE: 91 MMHG | HEIGHT: 69 IN | WEIGHT: 177.7 LBS | RESPIRATION RATE: 16 BRPM

## 2021-06-14 LAB
ANION GAP SERPL CALCULATED.3IONS-SCNC: 2 MMOL/L (ref 3–14)
BUN SERPL-MCNC: 25 MG/DL (ref 7–30)
CALCIUM SERPL-MCNC: 9.7 MG/DL (ref 8.5–10.1)
CHLORIDE SERPL-SCNC: 107 MMOL/L (ref 94–109)
CO2 SERPL-SCNC: 32 MMOL/L (ref 20–32)
CREAT SERPL-MCNC: 2.43 MG/DL (ref 0.52–1.04)
GFR SERPL CREATININE-BSD FRML MDRD: 21 ML/MIN/{1.73_M2}
GLUCOSE SERPL-MCNC: 70 MG/DL (ref 70–99)
POTASSIUM SERPL-SCNC: 4.7 MMOL/L (ref 3.4–5.3)
SODIUM SERPL-SCNC: 141 MMOL/L (ref 133–144)

## 2021-06-14 PROCEDURE — 250N000013 HC RX MED GY IP 250 OP 250 PS 637: Performed by: COLON & RECTAL SURGERY

## 2021-06-14 PROCEDURE — 36415 COLL VENOUS BLD VENIPUNCTURE: CPT | Performed by: STUDENT IN AN ORGANIZED HEALTH CARE EDUCATION/TRAINING PROGRAM

## 2021-06-14 PROCEDURE — 250N000013 HC RX MED GY IP 250 OP 250 PS 637: Performed by: STUDENT IN AN ORGANIZED HEALTH CARE EDUCATION/TRAINING PROGRAM

## 2021-06-14 PROCEDURE — 80048 BASIC METABOLIC PNL TOTAL CA: CPT | Performed by: STUDENT IN AN ORGANIZED HEALTH CARE EDUCATION/TRAINING PROGRAM

## 2021-06-14 RX ORDER — LOPERAMIDE HCL 1 MG/7.5ML
4 SUSPENSION ORAL 3 TIMES DAILY
Qty: 2700 ML | Refills: 0 | Status: SHIPPED | OUTPATIENT
Start: 2021-06-14 | End: 2021-07-14

## 2021-06-14 RX ORDER — OXYCODONE HYDROCHLORIDE 5 MG/1
5 TABLET ORAL EVERY 4 HOURS PRN
Qty: 5 TABLET | Refills: 0 | Status: SHIPPED | OUTPATIENT
Start: 2021-06-14 | End: 2021-07-23

## 2021-06-14 RX ORDER — ACETAMINOPHEN 500 MG
1000 TABLET ORAL 4 TIMES DAILY PRN
Qty: 240 TABLET | Refills: 0 | Status: SHIPPED | OUTPATIENT
Start: 2021-06-14 | End: 2021-07-14

## 2021-06-14 RX ORDER — LOPERAMIDE HCL 1 MG/7.5ML
2 SUSPENSION ORAL 4 TIMES DAILY
Status: DISCONTINUED | OUTPATIENT
Start: 2021-06-14 | End: 2021-06-14

## 2021-06-14 RX ORDER — PSYLLIUM SEED (WITH DEXTROSE)
2 POWDER (GRAM) ORAL 2 TIMES DAILY
Qty: 60 PACKET | Refills: 0 | Status: SHIPPED | OUTPATIENT
Start: 2021-06-14 | End: 2021-07-14

## 2021-06-14 RX ORDER — LOPERAMIDE HCL 1 MG/7.5ML
4 SUSPENSION ORAL 3 TIMES DAILY
Status: DISCONTINUED | OUTPATIENT
Start: 2021-06-14 | End: 2021-06-14 | Stop reason: HOSPADM

## 2021-06-14 RX ADMIN — Medication 2 WAFER: at 07:49

## 2021-06-14 RX ADMIN — FINASTERIDE 5 MG: 5 TABLET, FILM COATED ORAL at 07:49

## 2021-06-14 RX ADMIN — ESCITALOPRAM OXALATE 20 MG: 10 TABLET ORAL at 07:48

## 2021-06-14 RX ADMIN — LOPERAMIDE HCL 4 MG: 1 SOLUTION ORAL at 10:36

## 2021-06-14 RX ADMIN — BUSPIRONE HYDROCHLORIDE 5 MG: 5 TABLET ORAL at 07:49

## 2021-06-14 RX ADMIN — DUTASTERIDE 0.5 MG: 0.5 CAPSULE, LIQUID FILLED ORAL at 07:48

## 2021-06-14 RX ADMIN — CARVEDILOL 25 MG: 12.5 TABLET, FILM COATED ORAL at 07:49

## 2021-06-14 RX ADMIN — LOPERAMIDE HCL 2 MG: 1 SOLUTION ORAL at 07:49

## 2021-06-14 RX ADMIN — ACETAMINOPHEN 1000 MG: 500 TABLET, FILM COATED ORAL at 07:48

## 2021-06-14 ASSESSMENT — ACTIVITIES OF DAILY LIVING (ADL)
ADLS_ACUITY_SCORE: 15

## 2021-06-14 NOTE — PLAN OF CARE
A&Ox4.B/P was high this evening;B/P improved after evening dose of coreg 25 mg tab po.Ileostomy with large brown watery output; pt is on imodium 2 mg solution twice daily; pt empties ileostomy and saves it in the bathroom.Harry with moderate serosang output. Voids adequate amount. Good appetite.Up independent in [place]. Pt stated that she knows how to administer lovenox  Plan for possible discharge home tomorrow with lovenox.

## 2021-06-14 NOTE — DISCHARGE SUMMARY
[facility]  Discharge Summary  Colon and Rectal Surgery     Radha Jameson MRN# [id number]   YOB: 1960 Age: 60 year old     Date of Admission:  6/10/2021  Date of Discharge::  6/14/21  Admitting Physician:  Uriel Hung MD  Discharge Physician:  Uriel Hung MD  Primary Care Physician:        Hair Rivera          Admission Diagnoses:   Ulcerative pancolitis with complication (H) [K51.019]            Discharge Diagnosis:   Ulcerative pancolitis with complication (H) [K51.019]           Procedures:   1.  Cystoscopy with bilateral ureteral stent insertion and injection of indocyanine green.  2.  Laparoscopic robotic assisted pelvic dissection with mobilization of rectal stump.  3.  Ileostomy takedown.  4.  Laparoscopic robotic assisted ileal pouch-anal anastomosis.  5.  Flexible pouchoscopy with CO2 insufflation.  6.  Diverting loop ileostomy.              Consultations:   NUTRITION SERVICES ADULT IP CONSULT  OCCUPATIONAL THERAPY ADULT IP CONSULT  PHYSICAL THERAPY ADULT IP CONSULT  SOCIAL WORK IP CONSULT  WOUND OSTOMY CONTINENCE NURSE  IP CONSULT  VASCULAR ACCESS CARE ADULT IP CONSULT         Imaging Studies:     Results for orders placed or performed during the hospital encounter of 06/10/21   POC US GUIDANCE NEEDLE PLACEMENT    Impression    Bilateral Transversus Abdominis Plane Block              Medications Prior to Admission:     Medications Prior to Admission   Medication Sig Dispense Refill Last Dose     aspirin 81 MG EC tablet Take 81 mg by mouth daily   Past Week at Unknown time     augmented betamethasone dipropionate (DIPROLENE) 0.05 % external lotion APPLY TO THE AFFECTED AREA OF SCALP TWICE DAILY UNTIL IMPROVED   6/9/2021 at Unknown time     busPIRone (BUSPAR) 5 MG tablet Take 5 mg in the morning  Take 5 mg in the afternoon and   Take 10 mg (2 tablets) by mouth at bedtime.   6/10/2021 at 0800     carvedilol  (COREG) 25 MG tablet Take 25 mg by mouth 2 times daily (with meals)    6/10/2021 at 0800     cholecalciferol 25 MCG (1000 UT) TABS Take 1,000 Units by mouth daily   Past Week at Unknown time     dutasteride (AVODART) 0.5 MG capsule Take 0.5 mg by mouth 2 times daily    6/10/2021 at 0800     escitalopram (LEXAPRO) 20 MG tablet Take 20 mg by mouth every morning   6/10/2021 at 0800     finasteride (PROSCAR) 5 MG tablet Take 5 mg by mouth daily    6/10/2021 at 0800     nortriptyline (PAMELOR) 25 MG capsule Take 25 mg by mouth At Bedtime    Past Week at Unknown time     BIOTIN PO Take 1 tablet by mouth daily Unknown strength   More than a month at Unknown time     EPINEPHrine (ANY BX GENERIC EQUIV) 0.3 MG/0.3ML injection 2-pack INJECT 0.3 ML INTO THE MUSCLE ONE TIME PRF ALLERGIC REACTION   Unknown at Unknown time     ketoconazole (NIZORAL) 2 % external shampoo APPLY TO AFFECTED AREAS ON SCALP ONCE EVERY OTHER WEEK AND THEN WASH OFF   More than a month at Unknown time              Discharge Medications:     Current Discharge Medication List      START taking these medications    Details   acetaminophen (TYLENOL) 500 MG tablet Take 2 tablets (1,000 mg) by mouth 4 times daily as needed for mild pain  Qty: 240 tablet, Refills: 0    Associated Diagnoses: Ulcerative pancolitis with complication (H)      enoxaparin ANTICOAGULANT (LOVENOX) 30 MG/0.3ML syringe Inject 0.3 mLs (30 mg) Subcutaneous every 24 hours for 26 days  Qty: 7.8 mL, Refills: 0    Associated Diagnoses: Ulcerative pancolitis with complication (H)      loperamide (IMODIUM) 1 MG/7.5ML liquid Take 30 mLs (4 mg) by mouth 3 times daily  Qty: 2700 mL, Refills: 0    Associated Diagnoses: Ulcerative pancolitis with complication (H)      oxyCODONE (ROXICODONE) 5 MG tablet Take 1 tablet (5 mg) by mouth every 4 hours as needed for breakthrough pain  Qty: 5 tablet, Refills: 0    Associated Diagnoses: Ulcerative pancolitis with complication (H)      psyllium (METAMUCIL) [facility]  Take 2 Wafers by mouth 2 times daily  Qty: 60 packet, Refills: 0    Associated Diagnoses: Ulcerative pancolitis with complication (H)         CONTINUE these medications which have NOT CHANGED    Details   aspirin 81 MG EC tablet Take 81 mg by mouth daily      augmented betamethasone dipropionate (DIPROLENE) 0.05 % external lotion APPLY TO THE AFFECTED AREA OF SCALP TWICE DAILY UNTIL IMPROVED      busPIRone (BUSPAR) 5 MG tablet Take 5 mg in the morning  Take 5 mg in the afternoon and   Take 10 mg (2 tablets) by mouth at bedtime.      carvedilol (COREG) 25 MG tablet Take 25 mg by mouth 2 times daily (with meals)       cholecalciferol 25 MCG (1000 UT) TABS Take 1,000 Units by mouth daily      dutasteride (AVODART) 0.5 MG capsule Take 0.5 mg by mouth 2 times daily       escitalopram (LEXAPRO) 20 MG tablet Take 20 mg by mouth every morning      finasteride (PROSCAR) 5 MG tablet Take 5 mg by mouth daily       nortriptyline (PAMELOR) 25 MG capsule Take 25 mg by mouth At Bedtime       BIOTIN PO Take 1 tablet by mouth daily Unknown strength      EPINEPHrine (ANY BX GENERIC EQUIV) 0.3 MG/0.3ML injection 2-pack INJECT 0.3 ML INTO THE MUSCLE ONE TIME PRF ALLERGIC REACTION      ketoconazole (NIZORAL) 2 % external shampoo APPLY TO AFFECTED AREAS ON SCALP ONCE EVERY OTHER WEEK AND THEN WASH OFF                      Brief History of Illness:   Radha is a 60-year-old pleasant lady with chronic ulcerative pancolitis refractory to medical management.  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  She underwent a total abdominal colectomy with end ileostomy in 10/2019.  Completion proctectomy was performed in 07/2020, but given the size and amount of rectal varices the ileal pouch was aborted.  She does not have any history of colon dysplasia or invasive carcinoma.  She did develop a postoperative DVT and was on warfarin, but on her clinic visit she was only on aspirin at that time.   Radha is an extremely fit individual and she has been a weight  most of her life.  She does have stage III chronic kidney disease as one of diagnoses.  I evaluated her in clinic and performed a flexible sigmoidoscopy.  Her anorectal exam showed good sphincter tone and her rectal cuff measured approximately 4-5 cm from the anal verge.  There was a small rectocele.  There was no evidence of any active inflammation in the rectal stump.  She was off of all colitis medications or steroids.  I had a long discussion with her regarding the risks of performing ileal pouch anal anastomosis in the setting of extensive collateralization of her venous return in the abdomen.  We also discussed that even if we were able to construct a pouch that she would be at high risk for pouchitis and pouch varices with recurrent bleeding.  This would increase her likelihood of pouch failure.  This would also put her at a high risk for a vascular-mesenteric event and potential small bowel ischemia leading to a short gut syndrome and chronic TPN.  We also discussed that a reoperative pelvic operation can be associated with poor functional outcomes as well as anastomotic stricture.  She was very frustrated with her ileostomy and she had had it for approximately 2 years and this had made her severely depressed.  She also lives a very active lifestyle and would like to return to this, at least partially.  After discussing all of the risks, benefits, and alternatives of surgery we agreed to proceed with performing some abdominal imaging with an MRI as well as consultation with her nephrologist and hematologist and after getting all this information back, we decided to move forward with surgery.           Hospital Course:   Post-operatively pt was transferred to the general floor and gently fluid resuscitated. Her diet was progressed as she was able to tolerate oral intake. Her IV fluids were continued for an additional day given her history  "of CKD and initial concern for ANA given slight creatinine bump. She was ambulating and pain was controlled on an oral regimen starting POD1. She had good ileostomy output and was started on Imodium as well as Metamucil on POD2 which was titrated appropriately upon discharge. Valerio catheter was removed POD2 and she was able to void independently. She did extremely well during her postoperative course and was accordingly discharged home on 6/14 (POD4). She did not require home care nursing cares as she is experienced with having an ostomy.       Pt was seen by WOJERICHO and taught how to manage her ostomy (has had an ostomy for 2 yrs).   Pt was seen by PT/OT and deemed appropriate for discharge home.   Pt was taught how to self-inject post-operative prophylactic lovenox for which she is to do for a total of 30 days.   Post-operative pain was controlled with scheduled tylenol, ibuprofen, robaxin and prn oxycodone.       Patient is to follow up in the Colon and Rectal Surgery Clinic in 1 week with Simi Chen NP for close monitoring of electrolytes and ileostomy outputs. Then scheduled with Dr. Hung in 2-3 weeks after.          Day of Discharge Physical Exam:   Blood pressure (!) 158/91, pulse 67, temperature 98.8  F (37.1  C), temperature source Temporal, resp. rate 16, height 1.753 m (5' 9\"), weight 80.6 kg (177 lb 11.2 oz), SpO2 97 %, not currently breastfeeding.    Gen: AAOx3, NAD  Pulm: Non-labored breathing  Abd: Soft, appropriately tender, no guarding   Incision C/D/I with skin glue overlying   Stoma pink and viable with stool and gas in bag   ISAAC drain serosanguinous  Ext:  Warm and well-perfused         Final Pathology Result:   No pathology submitted           Discharge Instructions and Follow-Up:     Discharge Procedure Orders   Reason for your hospital stay   Order Comments: Ileal pouch anal anastomosis     Adult [facility] Follow-up and recommended labs and tests   Order Comments: Please follow " up with SIRIA Belcher in the colorectal clinic on Monday 6/21 to monitor ileostomy outputs and ISAAC drain outputs. She will be able to determine need for ongoing therapies to control outputs and potentially remove the drain at that time. Further follow up will be scheduled at that appt.     Appointments on [facility] and/or [facility] (with [facility] or [facility] provider or service). Call [phone] if you haven't heard regarding these appointments within 7 days of discharge.     Activity   Order Comments: Your activity upon discharge: no heavy lifting >10 lbs for 4-6 weeks. Can discuss additional recommendations at your follow up appt.     Order Specific Question Answer Comments   Is discharge order? Yes      Monitor and record   Order Comments: You have a ISAAC drain in your left abdomen. Please empty the drainage into a collection device when the bulb is full and record the outputs in mL in a notebook. Bring the notebook with you to clinic to determine if the drain can be removed.    You also have an ileostomy. Please empty the drainage into a collection device and record the outputs in mL in a notebook. Bring the notebook with you to clinic to determine if the medications to slow the output need to be adjusted.     Tubes and drains   Order Comments: You are going home with the following tubes or drains: ISAAC drain to bulb suction, ileostomy. See the monitor and record section for how to record the outputs.     Discharge Instructions   Order Comments: DIET  -Low Residue Diet for at least 4-6 weeks unless cleared by Colorectal surgery.  No raw vegetables, fruit skins, fibrous foods that require a lot of chewing, nuts, seeds, corn, popcorn.   -We recommend eating slowly, chewing thoroughly, eating small frequent meals throughout the day  -Stay well hydrated.    -Follow the food recommendations given by the Nutritionist and the Wound Ostomy Nurse.    ACTIVITY  -No lifting, pushing, pulling greater than 10 lbs and no  strenuous exercise for 6 weeks   -No driving while on narcotic analgesics (i.e. Percocet, oxycodone, Vicodin)  -No driving until you are able to fully twist to both sides or slam on brakes quickly and without any pain  -We encourage walking at least 4-5 times per day    WOUND CARE  -Inspect your wounds daily for signs of infection (increased redness, drainage, pain)  -Keep your wound clean and dry  -You may shower, but do not soak in tub or pool  -If you have staples, they can be removed in 7-10 days by your PCP or in Colorectal Clinic    NOTIFY  Please contact Kristin Delgado RN, Hilary Aguilar RN or Delores BERMEO at [phone] for problems after discharge such as:  -Temperature > 101F, chills, rigors, dizziness  -Redness around or purulent drainage from wound  -Inability to tolerate diet, nausea or vomiting  -You stop passing gas (or your stoma stops functioning), develop significant bloating, abdominal pain  -Have blood in stools/vomit  -Have severe diarrhea/constipation  -Any other questions or concerns.  - At nights (after 4:30pm), on weekends, or if urgent, call [phone] and ask the  to speak with the on-call Colorectal Surgery resident or fellow    -Measure your total daily ileostomy output and record.  If you have LESS than 500mL or GREATER than 1500mL of ileostomy output within a 24 hour period, please notify the Colorectal Nurse.  If you have greater than 1200-1500mL in a 24 hour period or greater than 500cc for three consecutive 8 hour shifts, take Imodium 2mg once, stay hydrated especially with Pedialyte and call the Colorectal Clinic to further discuss.      Medication Instructions  Some of your medications may have changed. Please take only prescribed and resumed medications     FOLLOW-UP  1.  You will need to follow-up with Simi Chen NP in the Colon and Rectal Surgery clinic in 1 week and then with CRS Staff: Dr. Uriel Hung in 4-5 weeks after.  Please contact our  clinic scheduler (phone # [phone]) if you have not heard from our clinic in 3 business days afer discharge to schedule a follow-up appointment. Please bring your log of daily ileostomy outputs to your follow up clinic appointment.   2.  Follow up with your primary care provider in 1-2 weeks after discharge from the hospital to review this hospitalization.         ORAL REHYDRATION RECIPE for Home Use  With your new ileostomy, you are at increased risk for dehydration,  Especially if you have high ileostomy outputs, or low appetites, you can help prevent it by drinking this mixture as directed.  Some examples of commercially prepared Oral Rehydration Solutions are: Pedialyte (solution, powder packet, Freeze Pops), or Drip Drop.     Electrolyte Water   -4 cups of water (equal to 1 quart or 32 ounces)   -  teaspoon salt   -6 teaspoons sugar   -Crystal Light if desired (or other choice Nutrasweet or Splenda flavoring - SUGAR-FREE) to taste (recommend lemonade or orange-pineapple flavors, but any flavor will work)    Add two cups of tap water to a large container. With measuring spoons (not table silverware), add the dry ingredients and stir or shake well. Add two additional cups of water. This will equal one quart of Electrolyte Water. Add sugar-free flavoring if desired. Best if chilled. Sip solution throughout the day. Discard after 24 hours.     Diet   Order Comments: Follow this diet upon discharge: Low fiber diet     Order Specific Question Answer Comments   Is discharge order? Yes             Home Health Care:     Not needed           Discharge Disposition:     Discharged to home      Condition at discharge: Tete Amezcua MD  Colon and Rectal Surgery     Pt was seen and discussed with Dr. Evans and Dr. Hung on 6/14/21

## 2021-06-14 NOTE — PLAN OF CARE
Reviewed ISAAC cares, how to empty, measure etc. Patient empties and cares for ileostomy, reviewed and has instructions on measuring, recording and monitoring output. Tolerating a low fiber diet. Up ambulating independently in halls. Pain managed with current regime. States she has given herself lovenox at home prior to admit and feels comfortable doing them at home, reviewed dosage of 30mg. Has follow up phone numbers if any issues arise.

## 2021-06-15 ENCOUNTER — TELEPHONE (OUTPATIENT)
Dept: SURGERY | Facility: CLINIC | Age: 61
End: 2021-06-15

## 2021-06-15 NOTE — TELEPHONE ENCOUNTER
[facility] Call Center    Phone Message    May a detailed message be left on voicemail: yes     Reason for Call: Other: Pt called in and wanted to know if she should be seen on Monday 6/21 because her discharge papers give that date or if she will just be seen the one time at her appt 6/24. Please let the pt know. Thank you.     Action Taken: Message routed to:  [facility] ([facility]    Travel Screening: Not Applicable

## 2021-06-16 DIAGNOSIS — Z09 FOLLOW-UP EXAMINATION AFTER COLORECTAL SURGERY: Primary | ICD-10-CM

## 2021-06-16 NOTE — TELEPHONE ENCOUNTER
Post Op Note     Called to check on patient postoperatively after hospital discharge.       Patient is s/p IPAA with DLI with Dr. Uriel Hung for UC.   Admitted 6/10/2021 and discharged on 6/14/2021.      Pain is well controlled with tylenol     Patient is eating and drinking normally. Patient is on a low fiber diet.  Encouraged patient to drink 8-10 glasses of water a day.     Patient has been recording daily ileostomy outputs. Patient has recorded outputs these were; 1150cc. Advised patient to contact the clinic if outputs are more than 1000 mL daily for two consecutive days or more than 2000 mL in one day or less than 500 mL for two consecutive days.    Patient reports taking medication for bowel function. Imodium TID and fiber BID     Patient denies pouching difficulties    If any pouching difficulties reported, please contact [facility] nurse  to schedule appointment.     Patient is voiding normally and urine is light in color.     Patient is not set up with home care.    Patient Denies nausea and vomiting.    Patient Denies any fevers or chills.    ISAAC: no issues- 140cc 6/14/2021, 155cc 6/15/2021,  85cc 6/16/2021    Lovenox: she is injecting this daily     Patient's incision is C/D/I. Patient reminded NOT to remove any dressings over their incisions.     Patient is on a activity restriction. Lifting 10 pounds for 6 weeks.     Patient Denies needing any forms completed.     Follow up is set up with Simi Finch NP on 6/24/2021 and with Dr. Uriel Hung on 7/26/2021.     She was told that she needs to come in Monday for a BP check and blood work but I was not updated with that. Sent a message to    Encouraged the patient to contact the clinic in the meantime with any fevers, chills, nausea, vomiting, increased colostomy output, no colostomy output, dizziness, lightheadedness, uncontrolled pain, changes to the incisions, or with any questions or concerns.    Patient's questions were  answered to their stated satisfaction and they are in agreement with this plan.    MILY Foster [phone]  Colon & Rectal Surgery Clinic  [facility]

## 2021-06-16 NOTE — OP NOTE
OPERATIVE REPORT    Pre-Procedure Diagnosis:   1.  Chronic ulcerative pancolitis refractory to medical therapy (status post total abdominal proctocolectomy with end ileostomy).  2.  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  3.  History of deep venous thrombosis.  4.  Chronic kidney disease (stage III).      Post-Procedure Diagnosis:  1.  Chronic ulcerative pancolitis refractory to medical therapy (status post total abdominal proctocolectomy with end ileostomy).  2.  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  3.  History of deep venous thrombosis.  4.  Chronic kidney disease (stage III).      Procedure:   1. Cystourethroscopy, with Bilateral ureteral catheterization, with instillation of ICG    Surgeon: Joaquin Dixon MD  Assistant: Nori Mcguire MD    Indications:  Radha Jameson is a 60 year old female undergoing colorectal surgery. Dr. Hung asked for bilateral ureteral ICG infusion to help identify ureters during his complex operation. I discussed with patient and she understood my role in procedure.    Description of Procedure:  After informed consent was obtained, the patient was brought to the procedure room and placed in the low lithotomy position.  The genitalia were prepped and draped in a sterile fashion.    Rigid cystoscope inserted into bladder.    Bladder was normal without tumors or stones.    Cone tipped ureteral access catheter was placed at left ureteral orifice. 5mL of ICG was instilled into the ureter, then flushed with 10cc saline. It was held in place for a few seconds.    Same procedure was repeated on right side.    Bladder was emptied. Scope was removed.    Case was turned over to colorectal service.    As attending surgeon, I, Joaquin Dixon MD, was present for critical portion of the procedure and available for the entire procedure.

## 2021-06-23 ENCOUNTER — NURSE TRIAGE (OUTPATIENT)
Dept: NURSING | Facility: CLINIC | Age: 61
End: 2021-06-23

## 2021-06-23 NOTE — TELEPHONE ENCOUNTER
I called Radha back to let her know we will look at this area tomorrow but to not be concerned.

## 2021-06-24 ENCOUNTER — OFFICE VISIT (OUTPATIENT)
Dept: SURGERY | Facility: CLINIC | Age: 61
End: 2021-06-24
Payer: COMMERCIAL

## 2021-06-24 VITALS
WEIGHT: 167.3 LBS | OXYGEN SATURATION: 100 % | HEIGHT: 69 IN | HEART RATE: 59 BPM | BODY MASS INDEX: 24.78 KG/M2 | TEMPERATURE: 97.8 F | DIASTOLIC BLOOD PRESSURE: 70 MMHG | SYSTOLIC BLOOD PRESSURE: 106 MMHG

## 2021-06-24 DIAGNOSIS — K51.019 ULCERATIVE PANCOLITIS WITH COMPLICATION (H): ICD-10-CM

## 2021-06-24 DIAGNOSIS — Z09 FOLLOW-UP EXAMINATION AFTER COLORECTAL SURGERY: ICD-10-CM

## 2021-06-24 DIAGNOSIS — Z09 FOLLOW-UP EXAMINATION AFTER COLORECTAL SURGERY: Primary | ICD-10-CM

## 2021-06-24 LAB
ANION GAP SERPL CALCULATED.3IONS-SCNC: 3 MMOL/L (ref 3–14)
BUN SERPL-MCNC: 29 MG/DL (ref 7–30)
CALCIUM SERPL-MCNC: 8.6 MG/DL (ref 8.5–10.1)
CHLORIDE SERPL-SCNC: 105 MMOL/L (ref 94–109)
CO2 SERPL-SCNC: 28 MMOL/L (ref 20–32)
CREAT SERPL-MCNC: 2.8 MG/DL (ref 0.52–1.04)
GFR SERPL CREATININE-BSD FRML MDRD: 18 ML/MIN/{1.73_M2}
GLUCOSE SERPL-MCNC: 132 MG/DL (ref 70–99)
HGB BLD-MCNC: 12 G/DL (ref 11.7–15.7)
POTASSIUM SERPL-SCNC: 4.9 MMOL/L (ref 3.4–5.3)
SODIUM SERPL-SCNC: 136 MMOL/L (ref 133–144)

## 2021-06-24 PROCEDURE — 99024 POSTOP FOLLOW-UP VISIT: CPT | Performed by: NURSE PRACTITIONER

## 2021-06-24 PROCEDURE — 85018 HEMOGLOBIN: CPT | Performed by: PATHOLOGY

## 2021-06-24 PROCEDURE — 36415 COLL VENOUS BLD VENIPUNCTURE: CPT | Performed by: PATHOLOGY

## 2021-06-24 PROCEDURE — 80048 BASIC METABOLIC PNL TOTAL CA: CPT | Performed by: PATHOLOGY

## 2021-06-24 ASSESSMENT — PAIN SCALES - GENERAL: PAINLEVEL: MILD PAIN (3)

## 2021-06-24 ASSESSMENT — MIFFLIN-ST. JEOR: SCORE: 1393.25

## 2021-06-24 NOTE — NURSING NOTE
"Chief Complaint   Patient presents with     Surgical Followup     Post-op follow up, DOS:06/10/2021       Vitals:    06/24/21 0945   BP: 106/70   BP Location: Right arm   Patient Position: Sitting   Cuff Size: Adult Regular   Pulse: 59   Temp: 97.8  F (36.6  C)   TempSrc: Oral   SpO2: 100%   Weight: 167 lb 4.8 oz   Height: 5' 9\"       Body mass index is 24.71 kg/m .          Delores Linares CMA    "

## 2021-06-24 NOTE — PROGRESS NOTES
"Colon and Rectal Surgery Postoperative Clinic Note    RE: Radha Jameson  : 1960  JU: 2021    Radha Jameson is a very pleasant 60 year old female with chronic ulcerative pancolitis refractory to medical management.  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  She underwent a total abdominal colectomy with end ileostomy in 10/2019.  Completion proctectomy was performed in 2020, but given the size and amount of rectal varices the ileal pouch was aborted.  She is now status post cystoscopy with bilateral ureteral stent insertion and injection of indocyanine green, laparoscopic robotic assisted pelvic dissection with mobilization of rectal stump, ileostomy takedown, laparoscopic robotic assisted ileal pouch-anal anastomosis, flexible pouchoscopy with CO2 insufflation, and diverting loop ileostomy on 6/10/21 with Dr. Hung.    Interval history: Radha has been feeling well since her surgery.  She is only slightly achy and is taking Tylenol only.  She is having about 500 cc out from her ostomy daily with no pouching difficulties.  She is taking Imodium twice a day and Metamucil once a day.  She did not have any output from her ISAAC drain for several days and then yesterday had a large clot in the tubing and 420 cc out.  She has had more than 200 cc out today.  It is a clear yellow color.  No difficulty voiding.  No fevers or chills.    Physical Examination:   /70 (BP Location: Right arm, Patient Position: Sitting, Cuff Size: Adult Regular)   Pulse 59   Temp 97.8  F (36.6  C) (Oral)   Ht 5' 9\"   Wt 167 lb 4.8 oz   SpO2 100%   BMI 24.71 kg/m    General: Alert, oriented, in no acute distress, sitting comfortably  HEENT: Mucous membranes moist  Abdomen: soft, non distended, non tender on palpation; incision well approximated without erythema or drainage. Stoma well everted with thick stool in bag and pouch with good seal. "     Assessment/Plan:  60 year old female status post cystoscopy with bilateral ureteral stent insertion and injection of indocyanine green, laparoscopic robotic assisted pelvic dissection with mobilization of rectal stump, ileostomy takedown, laparoscopic robotic assisted ileal pouch-anal anastomosis, flexible pouchoscopy with CO2 insufflation, and diverting loop ileostomy on 6/10/21 with Dr. Hung. She is recovering well. High ISAAC outputs after having no output for a few days. Clear in color. Would like to give it more time to see if the outputs decrease before pulling or checking ISAAC creatinine. Ostomy outputs are good with imodium twice a day and metamucil once a day. No need to keep measuring. She is scheduled for follow up with Dr. Hung at the end of July but encouraged her to contact the clinic in the meantime with any questions or concerns. Patient's questions were answered to her stated satisfaction and she is in agreement with this plan.    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     Ulcerative pancolitis with complication (H)        Surgical history:  Past Surgical History:   Procedure Laterality Date     BILATERAL OOPHORECTOMY  2009     BREAST SURGERY      reconstruction      SECTION       COLECTOMY TOTAL  2019     COLONOSCOPY       CV FEMORAL ANGIOGRAM       DAVINCI COLECTOMY N/A 6/10/2021    Procedure: ROBOT-ASSISTED  ileal pouch-anal anastomosis, diverting loop ileostomy;  Surgeon: Uriel Hung MD;  [facility]     EGD      , 2017     GASTRIC  FUNDOPLICATION  2017     HIATAL HERNIA REPAIR  2017    LINX     INSERT STENT URETER N/A 6/10/2021    Procedure: INSERTION, STENT, URETER, PREOPERATIVE;  Surgeon: Joaquin Dixon MD;  [facility]     OVARIAN CYST REMOVAL  1988     PELVIC LAPAROSCOPY  1985     Removal of LINX       RLE Venogram/thrombolysis Right 05/15/2020     SIGMOIDOSCOPY FLEXIBLE N/A 6/10/2021    Procedure: SIGMOIDOSCOPY, FLEXIBLE;  Surgeon: Uriel Hung MD;  [facility]       Problem list:  Patient Active Problem List    Diagnosis Date Noted     Ulcerative pancolitis with complication (H) 04/28/2021     Priority: Medium     Added automatically from request for surgery [id number]         Medications:  Current Outpatient Medications   Medication Sig Dispense Refill     acetaminophen (TYLENOL) 500 MG tablet Take 2 tablets (1,000 mg) by mouth 4 times daily as needed for mild pain 240 tablet 0     aspirin 81 MG EC tablet Take 81 mg by mouth daily       augmented betamethasone dipropionate (DIPROLENE) 0.05 % external lotion APPLY TO THE AFFECTED AREA OF SCALP TWICE DAILY UNTIL IMPROVED       BIOTIN PO Take 1 tablet by mouth daily Unknown strength       busPIRone (BUSPAR) 5 MG tablet Take 5 mg in the morning  Take 5 mg in the afternoon and   Take 10 mg (2 tablets) by mouth at bedtime.       carvedilol (COREG) 25 MG tablet Take 25 mg by mouth 2 times daily (with meals)        cholecalciferol 25 MCG (1000 UT) TABS Take 1,000 Units by mouth daily       dutasteride (AVODART) 0.5 MG capsule Take 0.5 mg by mouth 2 times daily        enoxaparin ANTICOAGULANT (LOVENOX) 30 MG/0.3ML syringe Inject 0.3 mLs (30 mg) Subcutaneous every 24 hours for 26 days 7.8 mL 0     EPINEPHrine (ANY BX GENERIC EQUIV) 0.3 MG/0.3ML injection 2-pack INJECT 0.3 ML INTO THE MUSCLE ONE TIME PRF ALLERGIC REACTION       escitalopram (LEXAPRO) 20 MG tablet Take 20 mg by mouth every morning       finasteride (PROSCAR) 5 MG tablet Take 5 mg by mouth daily         "ketoconazole (NIZORAL) 2 % external shampoo APPLY TO AFFECTED AREAS ON SCALP ONCE EVERY OTHER WEEK AND THEN WASH OFF       loperamide (IMODIUM) 1 MG/7.5ML liquid Take 30 mLs (4 mg) by mouth 3 times daily 2700 mL 0     nortriptyline (PAMELOR) 25 MG capsule Take 25 mg by mouth At Bedtime        psyllium (METAMUCIL) WAFR Take 2 Wafers by mouth 2 times daily 60 packet 0     oxyCODONE (ROXICODONE) 5 MG tablet Take 1 tablet (5 mg) by mouth every 4 hours as needed for breakthrough pain (Patient not taking: Reported on 6/24/2021) 5 tablet 0       Allergies:  Allergies   Allergen Reactions     Warfarin Other (See Comments)     Internal bleeding reaction.     Wasp Venom Protein Angioedema and Other (See Comments)     Morphine Itching     Itching in throat per pt     Amlodipine Other (See Comments)     Edema         Family history:  Family History   Problem Relation Age of Onset     Diabetes Mother      Hypertension Mother      Multiple myeloma Mother      Ulcerative Colitis Mother      Matson's esophagus Father      Cervical Cancer Sister      Diabetes Type 2  Brother      Asthma Maternal Grandmother      Breast Cancer Maternal Grandmother      Breast Cancer Paternal Grandmother        Social history:  Social History     Tobacco Use     Smoking status: Never Smoker     Smokeless tobacco: Never Used   Substance Use Topics     Alcohol use: Yes     Frequency: 2-4 times a month     Marital status: .    Nursing Notes:   Delores Carrillo CMA  6/24/2021 10:01 AM  Signed  Chief Complaint   Patient presents with     Surgical Followup     Post-op follow up, DOS:06/10/2021       Vitals:    06/24/21 0945   BP: 106/70   BP Location: Right arm   Patient Position: Sitting   Cuff Size: Adult Regular   Pulse: 59   Temp: 97.8  F (36.6  C)   TempSrc: Oral   SpO2: 100%   Weight: 167 lb 4.8 oz   Height: 5' 9\"       Body mass index is 24.71 kg/m .          Delores Linares CMA         15 minutes spent on the date of the " encounter doing chart review, history and exam, documentation and further activities as noted above.   This is a postop visit.    LEVI Rowe, NP-C  Colon and Rectal Surgery  [facility]    This note was created using speech recognition software and may contain unintended word substitutions.

## 2021-06-24 NOTE — LETTER
"2021       RE: Radha Jameson  [address]  [address]  [place] [address]     Dear Colleague,    Thank you for referring your patient, Radha Jameson, to the [facility] COLON AND RECTAL SURGERY CLINIC [place] at [facility]. Please see a copy of my visit note below.    Colon and Rectal Surgery Postoperative Clinic Note    RE: Radha Jameson  : 1960  JU: 2021    Radha Jameson is a very pleasant 60 year old female with chronic ulcerative pancolitis refractory to medical management.  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  She underwent a total abdominal colectomy with end ileostomy in 10/2019.  Completion proctectomy was performed in 2020, but given the size and amount of rectal varices the ileal pouch was aborted.  She is now status post cystoscopy with bilateral ureteral stent insertion and injection of indocyanine green, laparoscopic robotic assisted pelvic dissection with mobilization of rectal stump, ileostomy takedown, laparoscopic robotic assisted ileal pouch-anal anastomosis, flexible pouchoscopy with CO2 insufflation, and diverting loop ileostomy on 6/10/21 with Dr. Hung.    Interval history: Radha has been feeling well since her surgery.  She is only slightly achy and is taking Tylenol only.  She is having about 500 cc out from her ostomy daily with no pouching difficulties.  She is taking Imodium twice a day and Metamucil once a day.  She did not have any output from her ISAAC drain for several days and then yesterday had a large clot in the tubing and 420 cc out.  She has had more than 200 cc out today.  It is a clear yellow color.  No difficulty voiding.  No fevers or chills.    Physical Examination:   /70 (BP Location: Right arm, Patient Position: Sitting, Cuff Size: Adult Regular)   Pulse 59   Temp 97.8  F (36.6  C) (Oral)   Ht 5' 9\"   " Wt 167 lb 4.8 oz   SpO2 100%   BMI 24.71 kg/m    General: Alert, oriented, in no acute distress, sitting comfortably  HEENT: Mucous membranes moist  Abdomen: soft, non distended, non tender on palpation; incision well approximated without erythema or drainage. Stoma well everted with thick stool in bag and pouch with good seal.     Assessment/Plan:  60 year old female status post cystoscopy with bilateral ureteral stent insertion and injection of indocyanine green, laparoscopic robotic assisted pelvic dissection with mobilization of rectal stump, ileostomy takedown, laparoscopic robotic assisted ileal pouch-anal anastomosis, flexible pouchoscopy with CO2 insufflation, and diverting loop ileostomy on 6/10/21 with Dr. Hung. She is recovering well. High ISAAC outputs after having no output for a few days. Clear in color. Would like to give it more time to see if the outputs decrease before pulling or checking ISAAC creatinine. Ostomy outputs are good with imodium twice a day and metamucil once a day. No need to keep measuring. She is scheduled for follow up with Dr. Hung at the end of July but encouraged her to contact the clinic in the meantime with any questions or concerns. Patient's questions were answered to her stated satisfaction and she is in agreement with this plan.    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     Ulcerative pancolitis with complication (H)        Surgical history:  Past Surgical History:   Procedure Laterality Date     BILATERAL OOPHORECTOMY  2009     BREAST SURGERY  2003     reconstruction      SECTION       COLECTOMY TOTAL  2019     COLONOSCOPY       CV FEMORAL ANGIOGRAM       DAVINCI COLECTOMY N/A 6/10/2021    Procedure: ROBOT-ASSISTED  ileal pouch-anal anastomosis, diverting loop ileostomy;  Surgeon: Uriel Hung MD;  [facility]     EGD      , 2017     GASTRIC FUNDOPLICATION  2017     HIATAL HERNIA REPAIR  2017    LINX     INSERT STENT URETER N/A 6/10/2021    Procedure: INSERTION, STENT, URETER, PREOPERATIVE;  Surgeon: Joaquin Dixon MD;  [facility]     OVARIAN CYST REMOVAL       PELVIC LAPAROSCOPY       Removal of LINX       RLE Venogram/thrombolysis Right 05/15/2020     SIGMOIDOSCOPY FLEXIBLE N/A 6/10/2021    Procedure: SIGMOIDOSCOPY, FLEXIBLE;  Surgeon: Uriel Hung MD;  [facility]       Problem list:  Patient Active Problem List    Diagnosis Date Noted     Ulcerative pancolitis with complication (H) 2021     Priority: Medium     Added automatically from request for surgery [id number]         Medications:  Current Outpatient Medications   Medication Sig Dispense Refill     acetaminophen (TYLENOL) 500 MG tablet Take 2 tablets (1,000 mg) by mouth 4 times daily as needed for mild pain 240 tablet 0     aspirin 81 MG EC tablet Take 81 mg by mouth daily       augmented betamethasone dipropionate (DIPROLENE) 0.05 % external lotion APPLY TO THE AFFECTED AREA OF SCALP TWICE DAILY UNTIL IMPROVED       BIOTIN PO Take 1 tablet by mouth daily Unknown strength       busPIRone (BUSPAR) 5 MG tablet Take 5 mg in the morning  Take 5 mg in the afternoon and   Take 10 mg (2 tablets) by mouth at bedtime.       carvedilol (COREG) 25 MG tablet Take 25 mg by mouth 2 times daily (with meals)        cholecalciferol 25 MCG (1000 UT) TABS Take 1,000 Units by mouth daily       dutasteride (AVODART) 0.5 MG capsule Take 0.5 mg by mouth 2 times daily        enoxaparin ANTICOAGULANT (LOVENOX) 30 MG/0.3ML syringe Inject 0.3 mLs (30 mg) Subcutaneous  every 24 hours for 26 days 7.8 mL 0     EPINEPHrine (ANY BX GENERIC EQUIV) 0.3 MG/0.3ML injection 2-pack INJECT 0.3 ML INTO THE MUSCLE ONE TIME PRF ALLERGIC REACTION       escitalopram (LEXAPRO) 20 MG tablet Take 20 mg by mouth every morning       finasteride (PROSCAR) 5 MG tablet Take 5 mg by mouth daily        ketoconazole (NIZORAL) 2 % external shampoo APPLY TO AFFECTED AREAS ON SCALP ONCE EVERY OTHER WEEK AND THEN WASH OFF       loperamide (IMODIUM) 1 MG/7.5ML liquid Take 30 mLs (4 mg) by mouth 3 times daily 2700 mL 0     nortriptyline (PAMELOR) 25 MG capsule Take 25 mg by mouth At Bedtime        psyllium (METAMUCIL) WAFR Take 2 Wafers by mouth 2 times daily 60 packet 0     oxyCODONE (ROXICODONE) 5 MG tablet Take 1 tablet (5 mg) by mouth every 4 hours as needed for breakthrough pain (Patient not taking: Reported on 6/24/2021) 5 tablet 0       Allergies:  Allergies   Allergen Reactions     Warfarin Other (See Comments)     Internal bleeding reaction.     Wasp Venom Protein Angioedema and Other (See Comments)     Morphine Itching     Itching in throat per pt     Amlodipine Other (See Comments)     Edema         Family history:  Family History   Problem Relation Age of Onset     Diabetes Mother      Hypertension Mother      Multiple myeloma Mother      Ulcerative Colitis Mother      Matson's esophagus Father      Cervical Cancer Sister      Diabetes Type 2  Brother      Asthma Maternal Grandmother      Breast Cancer Maternal Grandmother      Breast Cancer Paternal Grandmother        Social history:  Social History     Tobacco Use     Smoking status: Never Smoker     Smokeless tobacco: Never Used   Substance Use Topics     Alcohol use: Yes     Frequency: 2-4 times a month     Marital status: .    Nursing Notes:   Delores Carrillo CMA  6/24/2021 10:01 AM  Signed  Chief Complaint   Patient presents with     Surgical Followup     Post-op follow up, DOS:06/10/2021       Vitals:    06/24/21 0945   BP:  "106/70   BP Location: Right arm   Patient Position: Sitting   Cuff Size: Adult Regular   Pulse: 59   Temp: 97.8  F (36.6  C)   TempSrc: Oral   SpO2: 100%   Weight: 167 lb 4.8 oz   Height: 5' 9\"       Body mass index is 24.71 kg/m .          Delores Linares CMA         15 minutes spent on the date of the encounter doing chart review, history and exam, documentation and further activities as noted above.   This is a postop visit.    LEVI Rowe, NP-C  Colon and Rectal Surgery  [facility]    This note was created using speech recognition software and may contain unintended word substitutions.        Again, thank you for allowing me to participate in the care of your patient.      Sincerely,    LEVI Rowe CNP      "

## 2021-06-28 ENCOUNTER — PATIENT OUTREACH (OUTPATIENT)
Dept: SURGERY | Facility: CLINIC | Age: 61
End: 2021-06-28

## 2021-06-28 ENCOUNTER — ALLIED HEALTH/NURSE VISIT (OUTPATIENT)
Dept: SURGERY | Facility: CLINIC | Age: 61
End: 2021-06-28
Payer: COMMERCIAL

## 2021-06-28 DIAGNOSIS — Z09 FOLLOW-UP EXAMINATION AFTER COLORECTAL SURGERY: Primary | ICD-10-CM

## 2021-06-28 DIAGNOSIS — K51.019 ULCERATIVE PANCOLITIS WITH COMPLICATION (H): Primary | ICD-10-CM

## 2021-06-28 DIAGNOSIS — Z09 FOLLOW-UP EXAMINATION AFTER COLORECTAL SURGERY: ICD-10-CM

## 2021-06-28 LAB
CREAT FLD-MCNC: 3 MG/DL
SPECIMEN SOURCE FLD: NORMAL

## 2021-06-28 PROCEDURE — 82570 ASSAY OF URINE CREATININE: CPT | Performed by: PATHOLOGY

## 2021-06-28 PROCEDURE — 99207 PR NO CHARGE NURSE ONLY: CPT

## 2021-06-28 NOTE — PROGRESS NOTES
Patient here to check creat from ISAAC. Collected fluid and sent down to lab. Schedule GGE as well.

## 2021-06-28 NOTE — PROGRESS NOTES
ISAAC drain output:       6/23 420cc  6/24 405cc  6/25 370cc  6/26 255cc  6/27 175cc  6/28 150cc    Will check creat fluid today

## 2021-06-29 ENCOUNTER — ALLIED HEALTH/NURSE VISIT (OUTPATIENT)
Dept: SURGERY | Facility: CLINIC | Age: 61
End: 2021-06-29
Payer: COMMERCIAL

## 2021-06-29 DIAGNOSIS — Z09 FOLLOW-UP EXAMINATION AFTER COLORECTAL SURGERY: Primary | ICD-10-CM

## 2021-06-29 NOTE — PROGRESS NOTES
Discussed ISAAC creatine with . Cameron to remove ISAAC.     Patient here for ISAAC removal. ISAAC was removed. Patient tolerated well. One of the stiches were too tight for removal with scissors so removal with scalpel was complete with help from Cynthia BERMEO

## 2021-07-02 ENCOUNTER — TELEPHONE (OUTPATIENT)
Dept: SURGERY | Facility: CLINIC | Age: 61
End: 2021-07-02

## 2021-07-02 DIAGNOSIS — Z11.59 ENCOUNTER FOR SCREENING FOR OTHER VIRAL DISEASES: ICD-10-CM

## 2021-07-02 PROBLEM — Z09 FOLLOW-UP EXAMINATION AFTER COLORECTAL SURGERY: Status: ACTIVE | Noted: 2021-07-02

## 2021-07-02 NOTE — TELEPHONE ENCOUNTER
Patient Name: Radha Jmaeson   MRN: [id number]   Case#: [id number]   Surgeon(s) and Role:      * Uriel Hung MD - Primary   Date requested: * No dates entered *   Location:  OR   Procedure(s):   CLOSURE, ILEOSTOMY, LAPAROSCOPIC (N/A)     Additional Instructions for the Case  Multi-surgeon case:  no  Time in minutes:  120  Anesthesia: general  Prep: no  Pre-Op: PAC  Labs: yes  WOC: no  Special equipment: no  Special Instructions: 6-8 weeks from last surgery     Schedule with Simi Chen NP 2-3 weeks after surgery.  Schedule with Surgeon 3-4 weeks after Simi Chen NP    Scheduled surgery and related appointments with patient via phone.    On 7/15/2021:  Sent Surgery Packet to patient via mail including all scheduling information previously completed with patient via phone as follows:     Required: Yes, you will need a  18 years or older to drive you home from your procedure as well as stay with you for 24 hours after your procedure, if you are discharged the same day as your procedure.     Surgery: Ileostomy Closure, Laparoscopic     Date: August 10, 2021                      Surgeon: Dr. Uriel Hung     Time: You will receive a call 1-3 days prior to your surgery with your finalized surgery and arrival time.      Location:     [address])      [place] [address]      [phone]      [email or web address]      Upcoming Appointments:   To ensure you are fully prepared for your surgery, please make sure the following items have been completed PRIOR to your surgery date:     Pre-operative History & Physical appointment:   Clinic appointment with Pre-operative Assessment Center (PAC): 7/26/2021 at 9:15 AM                                                                                  [facility]                                                                                   [address] [place] [id number]     Pre-operative consult with surgeon:  Clinic appointment with Dr. Uriel Hung: 7/26/2021 at 11:00 AM                                                                                 [facility]                                                                                [address] [place] [id number]     Pre-operative Lab appointment:   Lab draw appointment:                                   7/26/2021 at 11:45 AM                                                                                  [facility] Lab                                                                                  [address] [place] [id number]     Pre-operative COVID-19 Test:   Pre-procedure asymptomatic COVID PCR    8/6/2021 at 8:00 AM                                                                                  [facility] Lab                                                                                  [address] [place] [id number]     Post operative appointment:  Clinic appointment with Simi Chen NP: 8/30/2021 at 2:30 PM                                                                                 [facility](4K)                                                                                [address] [place] [phone]     Post operative appointment:  Clinic appointment with Dr. Uriel Hung: 10/4/2021 at 11:00 AM                                                                                  [facility](4K)                                                                                [address] [place] [address]

## 2021-07-05 NOTE — TELEPHONE ENCOUNTER
FUTURE VISIT INFORMATION      SURGERY INFORMATION:    Date: 8/10/21    [facility]    Surgeon:  Uriel Hung MD    Anesthesia Type:  general    Procedure: CLOSURE, ILEOSTOMY, LAPAROSCOPIC     RECORDS REQUESTED FROM:         Primary Care Provider: Hair Rivera MBBS  - Sarina     Most recent EKG+ Tracin20- Sarina     Most recent ECHO: 2009- Sarina     Most recent Sleep Study: 16- Sarina

## 2021-07-09 ENCOUNTER — ANCILLARY PROCEDURE (OUTPATIENT)
Dept: GENERAL RADIOLOGY | Facility: CLINIC | Age: 61
End: 2021-07-09
Attending: COLON & RECTAL SURGERY
Payer: COMMERCIAL

## 2021-07-09 DIAGNOSIS — Z09 FOLLOW-UP EXAMINATION AFTER COLORECTAL SURGERY: ICD-10-CM

## 2021-07-09 DIAGNOSIS — K51.019 ULCERATIVE PANCOLITIS WITH COMPLICATION (H): ICD-10-CM

## 2021-07-09 PROCEDURE — 74270 X-RAY XM COLON 1CNTRST STD: CPT | Mod: GC | Performed by: RADIOLOGY

## 2021-07-09 RX ADMIN — DIATRIZOATE MEGLUMINE AND DIATRIZOATE SODIUM 1 ML: 660; 100 SOLUTION ORAL; RECTAL at 09:37

## 2021-07-23 NOTE — PROGRESS NOTES
Preoperative Assessment Center Medication History Note    Medication history completed on July 23, 2021 by this writer. See Epic admission navigator for prior to admission medications. Operating room staff will still need to confirm medications and last dose information on day of surgery.     Medication history interview sources  Patient interview: Yes  Care Everywhere records: Yes  Surescripts pharmacy refill records: Yes    Changes made to PTA medication list (reason)  Added: none  Deleted: oxycodone (pt never needed this after recent surgery)  Changed:   -aspirin 81 mg daily > 325 mg daily upon recent discharge per Colorectal Surgery    Additional medication history information (including reliability of information, actions taken by pharmacist):  -pt manages her own meds, and was a good historian. Pt recently had med rec completed by pharmacist 6/2021, but report several changes post-operatively.    -- No recent (within 30 days) course of antibiotics  -- No recent (within 30 days) course of systemic steroids  -- Patient declines being on any other prescription or over-the-counter medications    Prior to Admission medications    Medication Sig Last Dose Taking? Auth Provider   aspirin (ASA) 325 MG EC tablet Take 325 mg by mouth daily Taking Yes Unknown, Entered By History   augmented betamethasone dipropionate (DIPROLENE) 0.05 % external lotion APPLY TO THE AFFECTED AREA OF SCALP TWICE DAILY UNTIL IMPROVED Taking Yes Reported, Patient   BIOTIN PO Take 1 tablet by mouth daily Unknown strength Taking Yes Unknown, Entered By History   busPIRone (BUSPAR) 5 MG tablet 10 mg 2 times daily  Taking Yes Reported, Patient   carvedilol (COREG) 25 MG tablet Take 25 mg by mouth 2 times daily (with meals)  Taking Yes Reported, Patient   cholecalciferol 25 MCG (1000 UT) TABS Take 1,000 Units by mouth daily Taking Yes Unknown, Entered By History   dutasteride (AVODART) 0.5 MG capsule Take 0.5 mg by mouth 2 times daily  Taking Yes  Reported, Patient   escitalopram (LEXAPRO) 20 MG tablet Take 20 mg by mouth every morning Taking Yes Reported, Patient   finasteride (PROSCAR) 5 MG tablet Take 5 mg by mouth daily  Taking Yes Reported, Patient   nortriptyline (PAMELOR) 25 MG capsule Take 25 mg by mouth At Bedtime  Taking Yes Reported, Patient   EPINEPHrine (ANY BX GENERIC EQUIV) 0.3 MG/0.3ML injection 2-pack INJECT 0.3 ML INTO THE MUSCLE ONE TIME PRF ALLERGIC REACTION Unknown  Reported, Patient   ketoconazole (NIZORAL) 2 % external shampoo APPLY TO AFFECTED AREAS ON SCALP ONCE EVERY OTHER WEEK AND THEN WASH OFF   Reported, Patient          Medication history completed by:   Martin Carlson, PharmD  [facility] Pharmacist  [phone]

## 2021-07-26 ENCOUNTER — PRE VISIT (OUTPATIENT)
Dept: SURGERY | Facility: CLINIC | Age: 61
End: 2021-07-26

## 2021-07-26 ENCOUNTER — OFFICE VISIT (OUTPATIENT)
Dept: SURGERY | Facility: CLINIC | Age: 61
End: 2021-07-26
Payer: COMMERCIAL

## 2021-07-26 ENCOUNTER — ANESTHESIA EVENT (OUTPATIENT)
Dept: SURGERY | Facility: CLINIC | Age: 61
End: 2021-07-26

## 2021-07-26 ENCOUNTER — TELEPHONE (OUTPATIENT)
Dept: GASTROENTEROLOGY | Facility: OUTPATIENT CENTER | Age: 61
End: 2021-07-26

## 2021-07-26 ENCOUNTER — LAB (OUTPATIENT)
Dept: LAB | Facility: CLINIC | Age: 61
End: 2021-07-26
Attending: COLON & RECTAL SURGERY
Payer: COMMERCIAL

## 2021-07-26 VITALS
BODY MASS INDEX: 23.34 KG/M2 | OXYGEN SATURATION: 100 % | TEMPERATURE: 97.4 F | SYSTOLIC BLOOD PRESSURE: 110 MMHG | HEART RATE: 56 BPM | HEIGHT: 69 IN | WEIGHT: 157.6 LBS | DIASTOLIC BLOOD PRESSURE: 66 MMHG

## 2021-07-26 VITALS
WEIGHT: 158 LBS | HEART RATE: 57 BPM | HEIGHT: 69 IN | OXYGEN SATURATION: 100 % | RESPIRATION RATE: 16 BRPM | DIASTOLIC BLOOD PRESSURE: 66 MMHG | SYSTOLIC BLOOD PRESSURE: 110 MMHG | BODY MASS INDEX: 23.4 KG/M2 | TEMPERATURE: 97.4 F

## 2021-07-26 DIAGNOSIS — K51.019 ULCERATIVE PANCOLITIS WITH COMPLICATION (H): ICD-10-CM

## 2021-07-26 DIAGNOSIS — Z01.818 PREOPERATIVE EXAMINATION: Primary | ICD-10-CM

## 2021-07-26 DIAGNOSIS — K91.30 ANASTOMOTIC STRICTURE OF COLORECTAL REGION: ICD-10-CM

## 2021-07-26 DIAGNOSIS — Z98.890 POSTOPERATIVE STATE: Primary | ICD-10-CM

## 2021-07-26 DIAGNOSIS — Z11.59 ENCOUNTER FOR SCREENING FOR OTHER VIRAL DISEASES: ICD-10-CM

## 2021-07-26 DIAGNOSIS — Z01.818 PREOPERATIVE EXAMINATION: ICD-10-CM

## 2021-07-26 DIAGNOSIS — Z09 FOLLOW-UP EXAMINATION AFTER COLORECTAL SURGERY: ICD-10-CM

## 2021-07-26 LAB
ABO/RH(D): NORMAL
ALBUMIN SERPL-MCNC: 3.4 G/DL (ref 3.4–5)
ALP SERPL-CCNC: 58 U/L (ref 40–150)
ALT SERPL W P-5'-P-CCNC: 20 U/L (ref 0–50)
ANION GAP SERPL CALCULATED.3IONS-SCNC: 5 MMOL/L (ref 3–14)
ANTIBODY SCREEN: NEGATIVE
AST SERPL W P-5'-P-CCNC: 16 U/L (ref 0–45)
BASOPHILS # BLD AUTO: 0 10E3/UL (ref 0–0.2)
BASOPHILS NFR BLD AUTO: 1 %
BILIRUB SERPL-MCNC: 0.5 MG/DL (ref 0.2–1.3)
BUN SERPL-MCNC: 38 MG/DL (ref 7–30)
CALCIUM SERPL-MCNC: 9.6 MG/DL (ref 8.5–10.1)
CHLORIDE BLD-SCNC: 112 MMOL/L (ref 94–109)
CO2 SERPL-SCNC: 19 MMOL/L (ref 20–32)
CREAT SERPL-MCNC: 3.32 MG/DL (ref 0.52–1.04)
EOSINOPHIL # BLD AUTO: 0.6 10E3/UL (ref 0–0.7)
EOSINOPHIL NFR BLD AUTO: 10 %
ERYTHROCYTE [DISTWIDTH] IN BLOOD BY AUTOMATED COUNT: 14.7 % (ref 10–15)
GFR SERPL CREATININE-BSD FRML MDRD: 14 ML/MIN/1.73M2
GLUCOSE BLD-MCNC: 78 MG/DL (ref 70–99)
HCT VFR BLD AUTO: 38.3 % (ref 35–47)
HGB BLD-MCNC: 12 G/DL (ref 11.7–15.7)
IMM GRANULOCYTES # BLD: 0 10E3/UL
IMM GRANULOCYTES NFR BLD: 0 %
LYMPHOCYTES # BLD AUTO: 1.3 10E3/UL (ref 0.8–5.3)
LYMPHOCYTES NFR BLD AUTO: 20 %
MCH RBC QN AUTO: 29.6 PG (ref 26.5–33)
MCHC RBC AUTO-ENTMCNC: 31.3 G/DL (ref 31.5–36.5)
MCV RBC AUTO: 95 FL (ref 78–100)
MONOCYTES # BLD AUTO: 0.6 10E3/UL (ref 0–1.3)
MONOCYTES NFR BLD AUTO: 9 %
NEUTROPHILS # BLD AUTO: 3.7 10E3/UL (ref 1.6–8.3)
NEUTROPHILS NFR BLD AUTO: 60 %
NRBC # BLD AUTO: 0 10E3/UL
NRBC BLD AUTO-RTO: 0 /100
PLATELET # BLD AUTO: 354 10E3/UL (ref 150–450)
POTASSIUM BLD-SCNC: 5 MMOL/L (ref 3.4–5.3)
PREALB SERPL IA-MCNC: 32 MG/DL (ref 15–45)
PROT SERPL-MCNC: 7.6 G/DL (ref 6.8–8.8)
RBC # BLD AUTO: 4.05 10E6/UL (ref 3.8–5.2)
SODIUM SERPL-SCNC: 136 MMOL/L (ref 133–144)
SPECIMEN EXPIRATION DATE: NORMAL
WBC # BLD AUTO: 6.2 10E3/UL (ref 4–11)

## 2021-07-26 PROCEDURE — 86901 BLOOD TYPING SEROLOGIC RH(D): CPT | Mod: 90 | Performed by: PATHOLOGY

## 2021-07-26 PROCEDURE — 99000 SPECIMEN HANDLING OFFICE-LAB: CPT | Performed by: PATHOLOGY

## 2021-07-26 PROCEDURE — 86850 RBC ANTIBODY SCREEN: CPT | Mod: 90 | Performed by: PATHOLOGY

## 2021-07-26 PROCEDURE — 80053 COMPREHEN METABOLIC PANEL: CPT | Performed by: PATHOLOGY

## 2021-07-26 PROCEDURE — 36415 COLL VENOUS BLD VENIPUNCTURE: CPT | Performed by: PATHOLOGY

## 2021-07-26 PROCEDURE — 85025 COMPLETE CBC W/AUTO DIFF WBC: CPT | Performed by: PATHOLOGY

## 2021-07-26 PROCEDURE — 84134 ASSAY OF PREALBUMIN: CPT | Performed by: PATHOLOGY

## 2021-07-26 PROCEDURE — 86900 BLOOD TYPING SEROLOGIC ABO: CPT | Mod: 90 | Performed by: PATHOLOGY

## 2021-07-26 PROCEDURE — 99024 POSTOP FOLLOW-UP VISIT: CPT | Performed by: COLON & RECTAL SURGERY

## 2021-07-26 ASSESSMENT — LIFESTYLE VARIABLES: TOBACCO_USE: 0

## 2021-07-26 ASSESSMENT — PAIN SCALES - GENERAL
PAINLEVEL: NO PAIN (0)
PAINLEVEL: NO PAIN (0)

## 2021-07-26 ASSESSMENT — MIFFLIN-ST. JEOR
SCORE: 1349.25
SCORE: 1351.06

## 2021-07-26 ASSESSMENT — ENCOUNTER SYMPTOMS: SEIZURES: 0

## 2021-07-26 NOTE — NURSING NOTE
"Chief Complaint   Patient presents with     Surgical Followup     Post-op follow up, DOS:06/10/2021       Vitals:    07/26/21 1055   BP: 110/66   BP Location: Left arm   Patient Position: Sitting   Cuff Size: Adult Regular   Pulse: 56   Temp: 97.4  F (36.3  C)   TempSrc: Oral   SpO2: 100%   Weight: 157 lb 9.6 oz   Height: 5' 9\"       Body mass index is 23.27 kg/m .         Delores Linares CMA    "

## 2021-07-26 NOTE — ANESTHESIA PREPROCEDURE EVALUATION
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     Ulcerative pancolitis with complication (H)       Past Surgical History:   Procedure Laterality Date     BILATERAL OOPHORECTOMY  2009     BREAST SURGERY  2003    reconstruction      SECTION       COLECTOMY TOTAL       COLONOSCOPY       CV FEMORAL ANGIOGRAM       DAVINCI COLECTOMY N/A 6/10/2021    Procedure: ROBOT-ASSISTED  ileal pouch-anal anastomosis, diverting loop ileostomy;  Surgeon: Uriel Hung MD;  Location: [facility] OR     EGD      , 2017     GASTRIC FUNDOPLICATION  2017     HIATAL HERNIA REPAIR  2017    LINX     INSERT STENT URETER N/A 6/10/2021    Procedure: INSERTION, STENT, URETER, PREOPERATIVE;  Surgeon: Joaquin Dixon MD;  Location: [facility] OR     OVARIAN CYST REMOVAL       PELVIC LAPAROSCOPY       Removal of LINX       RLE Venogram/thrombolysis Right 05/15/2020     SIGMOIDOSCOPY FLEXIBLE N/A 6/10/2021    Procedure: SIGMOIDOSCOPY, FLEXIBLE;  Surgeon: Uriel Hung MD;  Location: [facility] OR      Allergies   Allergen Reactions     Warfarin Other (See Comments)     Internal bleeding reaction.     Wasp Venom Protein Angioedema and Other (See Comments)     Morphine Itching     Itching in throat per pt     Amlodipine Other (See Comments)     Edema        Social History     Tobacco Use     Smoking status: Never Smoker     Smokeless tobacco: Never Used   Substance Use  Topics     Alcohol use: Yes      Wt Readings from Last 1 Encounters:   06/24/21 75.9 kg (167 lb 4.8 oz)        Anesthesia Evaluation   Pt has had prior anesthetic. Type: General and MAC.    History of anesthetic complications  - PONV.  Two episodes of hypotension during surgery.    ROS/MED HX  ENT/Pulmonary:     (+) NICKI risk factors, snores loudly, hypertension,  (-) tobacco use   Neurologic:  - neg neurologic ROS  (-) no seizures, no CVA and no TIA   Cardiovascular:     (+) hypertension-----Taking blood thinners Pt has received instructions: Instructions Given to patient: Will hold ASA for 5 days prior to surgery. Previous cardiac testing   Echo: Date: Results:    Stress Test: Date: Results:    ECG Reviewed: Date: 7/26/20 Results:  SB  Cath: Date: Results:      METS/Exercise Tolerance: >4 METS    Hematologic: Comments: Lupus anticoagulant     (+) History of blood clots, pt is not anticoagulated, anemia, history of blood transfusion, no previous transfusion reaction, Known PRBC Anitbodies:No     Musculoskeletal: Comment: Firbromyalgia, neck pain, DJD      GI/Hepatic: Comment: History of Matson's  Denies GERD symptoms  S/p Fundoplication     (+) Inflammatory bowel disease,  (-) GERD   Renal/Genitourinary: Comment: PCOS    (+) renal disease, type: CRI, Pt does not require dialysis,     Endo:     (+) thyroid problem,  Thyroid disease - Other history of thyrotoxicosis due to herbal supplement,     Psychiatric/Substance Use:     (+) psychiatric history anxiety and depression     Infectious Disease:  - neg infectious disease ROS     Malignancy:  - neg malignancy ROS     Other:  - neg other ROS    (+) , H/O Chronic Pain,        Physical Exam    Airway      Comment: Left sided torus    Mallampati: II   TM distance: > 3 FB   Neck ROM: full   Mouth opening: > 3 cm    Respiratory Devices and Support         Dental           Cardiovascular   cardiovascular exam normal          Pulmonary   pulmonary exam normal                 OUTSIDE LABS:  CBC:   Lab Results   Component Value Date    WBC 5.9 06/02/2021    WBC 4.4 04/26/2021    HGB 12.0 06/24/2021    HGB 13.3 06/11/2021    HCT 44.6 06/02/2021    HCT 44.0 04/26/2021     06/11/2021     06/02/2021     BMP:   Lab Results   Component Value Date     06/24/2021     06/14/2021    POTASSIUM 4.9 06/24/2021    POTASSIUM 4.7 06/14/2021    CHLORIDE 105 06/24/2021    CHLORIDE 107 06/14/2021    CO2 28 06/24/2021    CO2 32 06/14/2021    BUN 29 06/24/2021    BUN 25 06/14/2021    CR 2.80 (H) 06/24/2021    CR 2.43 (H) 06/14/2021     (H) 06/24/2021    GLC 70 06/14/2021     COAGS:   Lab Results   Component Value Date    PTT 26 06/02/2021    INR 1.02 06/02/2021     POC:   Lab Results   Component Value Date    BGM 89 06/10/2021     HEPATIC:   Lab Results   Component Value Date    ALBUMIN 3.9 06/02/2021    PROTTOTAL 7.6 06/02/2021    ALT 41 06/02/2021    AST 22 06/02/2021    ALKPHOS 59 06/02/2021    BILITOTAL 0.8 06/02/2021     OTHER:   Lab Results   Component Value Date    STEPHANIE 8.6 06/24/2021    PHOS 4.3 06/10/2021    MAG 2.2 06/11/2021             PAC Discussion and Assessment    ASA Classification: 3  Case is suitable for: [place]  Anesthetic techniques and relevant risks discussed: GA                  PAC Resident/NP Anesthesia Assessment: Radha is a 60 year old woman who is scheduled for CLOSURE, ILEOSTOMY, LAPAROSCOPIC on 8/10/21 by Dr. Hung in treatment of Ulcerative pancolitis with complication.  PAC referral for risk assessment and optimization for anesthesia with comorbid conditions of HTN, lupus anticoagulant, multiple DVTs, portal vein thrombosis, SMV thrombosis, anemia, history of transfusion, history of thyrotoxicosis, GERD, Barretts esophagus, CKD IV, PCOS, fibromyalgia, chronic pain, DJD, anxiety and depression:      Pre-operative considerations:   1.  Cardiac:  Functional status- METS >4.  High risk surgery with 6.6% (RCRI #) risk of major adverse  "cardiac event.    ~ HTN - the patient will continue coreg and need to hold  mg. She had EKG in 7/26/20 with sinus bradycardia. She has a METS of >4 as a competitive weight . No further testing indicated    2.  Pulm:  Airway feasible.  NICKI risk: Intermediate (age, HTN, snoring)     3. Heme:   Multiple DVTs, portal vein thrombosis, SMV thrombosis - found to have lupus anticoagulant. She is followed by an outside oncologist at [facility]. For her previous surgery on 6/10/21 they recommended \"Due to high risk of recurrent abdominal VTE, I recommend a prolonged post operative course of LMWH vs heparain with bridging to coumadin based on kidney function for an additional six weeks.\" The patient tolerated the lovenox without any issues. She remains on  mg and per surgical team should hold 5 days prior to surgery.   ~ Anemia - hgb was 12 on 6/24/21. She has had a transfusion in the past without issues. Will get type and screen today.     4. Hair - the patient takes dutasteride and proscar for hair loss.     5. Endo: Remote history of  thyrotoxicosis from herbal supplement. No current concerns.     6. GI:  Risk of PONV score = 4.  If > 2, anti-emetic intervention recommended. Patient reports no issues with PONV after last procedure.   ~ UC - procedure as above.   ~ GERD/ Barretts esophagus/ s/p fundoplication - not currently on medications and denies symptoms.     7. :  CKD IV - baseline creatinine appears to be 2.5-2.8. She is followed by an outside nephrologist at [facility] for her last surgery on 6/10/21 did not see any contraindication to proceeding. Consideration for close monitoring of fluids and avoid nephrotoxins. She will have recheck of labs today.   ~ PCOS - s/p oophorectomy    8. Psych: anxiety, depression - continue Lexapro, nortriptyline and buspar.      9. Musculoskeletal:  fibromyoaglia, chronic pain, neck pain, DJD - the patient sleeps on her side or finds a pillow under her heck is " helpful. Consideration for careful positioning to minimize discomfort.     10. Anesthesia: The patient reports she is a difficult IV access. She has torus in her mouth and her lower left one became irritated and infected after last intubation. She was treated with antibiotics by her dentist and has no current concerns.     VTE risk: 3%     Patient is optimized and is acceptable candidate for the proposed procedure.  No further diagnostic evaluation is needed.      Patient discussed with Dr. Mark     For further details of assessment, testing, and physical exam please see H and P completed on same date     Patricia Coy PA-C       Mid-Level Provider/Resident: Patricia Coy PA-C  Date: 7/26/21                                 Patricia Coy PA-C

## 2021-07-26 NOTE — H&P
Pre-Operative H & P     CC:  Preoperative exam to assess for increased cardiopulmonary risk while undergoing surgery and anesthesia.    Date of Encounter: 7/26/2021  Primary Care Physician:  Hair Rivera     Reason for visit: pre operative examination, Ulcerative pancolitis with complication    HPI  Radha Jameson is a 60 year old female who presents for pre-operative H & P in preparation for CLOSURE, ILEOSTOMY, LAPAROSCOPIC with Dr. Hung on 8/10/21 at [facility].     History is obtained from the patient and chart review    The patient is a 60 year old woman who has a complex medical history to include ulcerative colitis, refractory to medical management, s/p total abdominal colectomy in 2019. She returned to the OR in 7/2020 for completion proctectomy and pouch formation. The pouch, however, could not be formed due to rectal varices in significant pelvic venous collaterals related to her history of congenitally absent IVC. She more recently consulted with Dr. Hung and underwent INSERTION, STENT, URETER, PREOPERATIVE, ROBOT-ASSISTED  ileal pouch-anal anastomosis, diverting loop ileostomy, SIGMOIDOSCOPY, FLEXIBLE on 6/10/21. She has continued to follow up with colorectal surgery team and is now scheduled for the procedure as above.     The patient s other past medical history is significant for HTN, multiple DVTs, portal vein thrombosis, SMV thrombosis, absent IVC, lupus anticoagulant, anemia, history of transfusion, history of thyrotoxicosis, GERD, Matson s esophagus, s/p fundoplication, CKD IV, PCOS, chronic pain, fibromyalgia, DJD, anxiety and depression.     Hx of abnormal bleeding or anti-platelet use: ASA 325mg - hold 5 days prior to surgery per surgical team    Menstrual history: No LMP recorded. Patient is postmenopausal.:     Prior to Admission Medications  Current Outpatient Medications   Medication Sig Dispense Refill     aspirin (ASA) 325 MG  EC tablet Take 325 mg by mouth daily       augmented betamethasone dipropionate (DIPROLENE) 0.05 % external lotion APPLY TO THE AFFECTED AREA OF SCALP TWICE DAILY UNTIL IMPROVED       BIOTIN PO Take 1 tablet by mouth daily Unknown strength       busPIRone (BUSPAR) 5 MG tablet 10 mg 2 times daily        carvedilol (COREG) 25 MG tablet Take 25 mg by mouth 2 times daily (with meals)        cholecalciferol 25 MCG (1000 UT) TABS Take 1,000 Units by mouth daily       dutasteride (AVODART) 0.5 MG capsule Take 0.5 mg by mouth 2 times daily        escitalopram (LEXAPRO) 20 MG tablet Take 20 mg by mouth every morning       finasteride (PROSCAR) 5 MG tablet Take 5 mg by mouth daily        nortriptyline (PAMELOR) 25 MG capsule Take 25 mg by mouth At Bedtime        EPINEPHrine (ANY BX GENERIC EQUIV) 0.3 MG/0.3ML injection 2-pack INJECT 0.3 ML INTO THE MUSCLE ONE TIME PRF ALLERGIC REACTION       ketoconazole (NIZORAL) 2 % external shampoo APPLY TO AFFECTED AREAS ON SCALP ONCE EVERY OTHER WEEK AND THEN WASH OFF         The complete review of systems is negative other than noted in the HPI or here.       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     Ulcerative pancolitis with complication (H)       Past Surgical History:   Procedure Laterality Date     BILATERAL OOPHORECTOMY  2009     BREAST SURGERY  2003    reconstruction      SECTION       COLECTOMY TOTAL  2019     COLONOSCOPY       CV FEMORAL ANGIOGRAM       DAVINCI COLECTOMY N/A 6/10/2021    Procedure: ROBOT-ASSISTED  ileal pouch-anal anastomosis, diverting loop ileostomy;   Surgeon: Uriel Hung MD;  Location: [facility] OR     EGD      2016, 2017     GASTRIC FUNDOPLICATION  2017     HIATAL HERNIA REPAIR  2017    LINX     INSERT STENT URETER N/A 6/10/2021    Procedure: INSERTION, STENT, URETER, PREOPERATIVE;  Surgeon: Joaquin Dixon MD;  Location: [facility] OR     OVARIAN CYST REMOVAL  1988     PELVIC LAPAROSCOPY  1985     Removal of LINX       RLE Venogram/thrombolysis Right 05/15/2020     SIGMOIDOSCOPY FLEXIBLE N/A 6/10/2021    Procedure: SIGMOIDOSCOPY, FLEXIBLE;  Surgeon: Uriel Hung MD;  Location: [facility] OR      Allergies   Allergen Reactions     Warfarin Other (See Comments)     Internal bleeding reaction.     Wasp Venom Protein Angioedema and Other (See Comments)     Morphine Itching     Itching in throat per pt     Amlodipine Other (See Comments)     Edema        Social History     Tobacco Use     Smoking status: Never Smoker     Smokeless tobacco: Never Used   Substance Use Topics     Alcohol use: Yes      Wt Readings from Last 1 Encounters:   06/24/21 75.9 kg (167 lb 4.8 oz)        Anesthesia Evaluation   Pt has had prior anesthetic. Type: General and MAC.    History of anesthetic complications  - PONV.  Two episodes of hypotension during surgery.    ROS/MED HX  ENT/Pulmonary:     (+) NICKI risk factors, snores loudly, hypertension,  (-) tobacco use   Neurologic:  - neg neurologic ROS  (-) no seizures, no CVA and no TIA   Cardiovascular:     (+) hypertension-----Taking blood thinners Pt has received instructions: Instructions Given to patient: Will hold ASA for 5 days prior to surgery. Previous cardiac testing   Echo: Date: Results:    Stress Test: Date: Results:    ECG Reviewed: Date: 7/26/20 Results:  SB  Cath: Date: Results:      METS/Exercise Tolerance: >4 METS    Hematologic: Comments: Lupus anticoagulant     (+) History of blood clots, pt is not anticoagulated, anemia, history of blood transfusion, no previous transfusion reaction, Known PRBC Anitbodies:No    "  Musculoskeletal: Comment: Firbromyalgia, neck pain, DJD      GI/Hepatic: Comment: History of Matson's  Denies GERD symptoms  S/p Fundoplication     (+) Inflammatory bowel disease,  (-) GERD   Renal/Genitourinary: Comment: PCOS    (+) renal disease, type: CRI, Pt does not require dialysis,     Endo:     (+) thyroid problem,  Thyroid disease - Other history of thyrotoxicosis due to herbal supplement,     Psychiatric/Substance Use:     (+) psychiatric history anxiety and depression     Infectious Disease:  - neg infectious disease ROS     Malignancy:  - neg malignancy ROS     Other:  - neg other ROS    (+) , H/O Chronic Pain,      OUTSIDE LABS:  CBC:   Lab Results   Component Value Date    WBC 5.9 06/02/2021    WBC 4.4 04/26/2021    HGB 12.0 06/24/2021    HGB 13.3 06/11/2021    HCT 44.6 06/02/2021    HCT 44.0 04/26/2021     06/11/2021     06/02/2021     BMP:   Lab Results   Component Value Date     06/24/2021     06/14/2021    POTASSIUM 4.9 06/24/2021    POTASSIUM 4.7 06/14/2021    CHLORIDE 105 06/24/2021    CHLORIDE 107 06/14/2021    CO2 28 06/24/2021    CO2 32 06/14/2021    BUN 29 06/24/2021    BUN 25 06/14/2021    CR 2.80 (H) 06/24/2021    CR 2.43 (H) 06/14/2021     (H) 06/24/2021    GLC 70 06/14/2021     COAGS:   Lab Results   Component Value Date    PTT 26 06/02/2021    INR 1.02 06/02/2021     POC:   Lab Results   Component Value Date    BGM 89 06/10/2021     HEPATIC:   Lab Results   Component Value Date    ALBUMIN 3.9 06/02/2021    PROTTOTAL 7.6 06/02/2021    ALT 41 06/02/2021    AST 22 06/02/2021    ALKPHOS 59 06/02/2021    BILITOTAL 0.8 06/02/2021     OTHER:   Lab Results   Component Value Date    STEPHANIE 8.6 06/24/2021    PHOS 4.3 06/10/2021    MAG 2.2 06/11/2021        158 lbs 0 oz  5' 9\"[pt reported[   Body mass index is 23.33 kg/m .       Physical Exam  Constitutional: Awake, alert, cooperative, no apparent distress, and appears stated age.  Eyes: Pupils equal, round and " reactive to light, extra ocular muscles intact, sclera clear, conjunctiva normal.  HENT: Normocephalic, oral pharynx with moist mucus membranes, good dentition. No goiter appreciated.   Respiratory: Clear to auscultation bilaterally, no crackles or wheezing.  Cardiovascular: Regular rate and rhythm, normal S1 and S2, and no murmur noted.  Carotids +2, no bruits. No edema. Palpable pulses to radial  DP and PT arteries.   GI: Normal bowel sounds, soft, non-distended, non-tender, no masses palpated, no hepatosplenomegaly.  Surgical scars: well healed. Small incisional hernia without obstruction. Ileostomy is pink and viable with stool and gas  Lymph/Hematologic: No cervical lymphadenopathy and no supraclavicular lymphadenopathy.  Genitourinary:  defer  Skin: Warm and dry.  No rashes at anticipated surgical site.   Musculoskeletal: Full ROM of neck. There is no redness, warmth, or swelling of the joints. Gross motor strength is normal.    Neurologic: Awake, alert, oriented to name, place and time. Cranial nerves II-XII are grossly intact. Gait is normal.   Neuropsychiatric: Calm, cooperative. Normal affect.     EK20  Sinus bradycardia    The patient's records and results personally reviewed by this provider.     Outside records reviewed from: care everywhere    ASSESSMENT and PLAN  Radha is a 60 year old woman who is scheduled for CLOSURE, ILEOSTOMY, LAPAROSCOPIC on 8/10/21 by Dr. Hung in treatment of Ulcerative pancolitis with complication.  PAC referral for risk assessment and optimization for anesthesia with comorbid conditions of HTN, lupus anticoagulant, multiple DVTs, portal vein thrombosis, SMV thrombosis, anemia, history of transfusion, history of thyrotoxicosis, GERD, Barretts esophagus, CKD IV, PCOS, fibromyalgia, chronic pain, DJD, anxiety and depression:      Pre-operative considerations:   1.  Cardiac:  Functional status- METS >4.  High risk surgery with 6.6% (RCRI #) risk of major adverse  "cardiac event.    ~ HTN - the patient will continue coreg and need to hold  mg. She had EKG in 7/26/20 with sinus bradycardia. She has a METS of >4 as a competitive weight . No further testing indicated    2.  Pulm:  Airway feasible.  NICKI risk: Intermediate (age, HTN, snoring)     3. Heme:   Multiple DVTs, portal vein thrombosis, SMV thrombosis - found to have lupus anticoagulant. She is followed by an outside oncologist at [facility]. For her previous surgery on 6/10/21 they recommended \"Due to high risk of recurrent abdominal VTE, I recommend a prolonged post operative course of LMWH vs heparain with bridging to coumadin based on kidney function for an additional six weeks.\" The patient tolerated the lovenox without any issues. She remains on  mg and per surgical team should hold 5 days prior to surgery.   ~ Anemia - hgb was 12 on 6/24/21. She has had a transfusion in the past without issues. Will get type and screen today.     4. Hair - the patient takes dutasteride and proscar for hair loss.     5. Endo: Remote history of  thyrotoxicosis from herbal supplement. No current concerns.     6. GI:  Risk of PONV score = 4.  If > 2, anti-emetic intervention recommended. Patient reports no issues with PONV after last procedure.   ~ UC - procedure as above.   ~ GERD/ Barretts esophagus/ s/p fundoplication - not currently on medications and denies symptoms.     7. :  CKD IV - baseline creatinine appears to be 2.5-2.8. She is followed by an outside nephrologist at [place] for her last surgery on 6/10/21 did not see any contraindication to proceeding. Consideration for close monitoring of fluids and avoid nephrotoxins. She will have recheck of labs today.   ~ PCOS - s/p oophorectomy    8. Psych: anxiety, depression - continue Lexapro, nortriptyline and buspar.      9. Musculoskeletal:  fibromyoaglia, chronic pain, neck pain, DJD - the patient sleeps on her side or finds a pillow under her heck is " helpful. Consideration for careful positioning to minimize discomfort.     10. Anesthesia: The patient reports she is a difficult IV access. She has torus in her mouth and her lower left one became irritated and infected after last intubation. She was treated with antibiotics by her dentist and has no current concerns.     VTE risk: 3%      Patient was discussed with Dr Mark.      The patient is optimized for their procedure. AVS with information on surgery time/arrival time, meds and NPO status given by nursing staff.          On the day of service:     Prep time: 3 minutes  Visit time: 19 minutes  Documentation time: 15 minutes  ------------------------------------------  Total time: 37 minutes      Patricia Coy PA-C  Preoperative Assessment Center  [facility]  Clinic and Surgery Center  Phone: [phone]  Fax: [phone]

## 2021-07-26 NOTE — TELEPHONE ENCOUNTER
Is patient taking blood thinners/antiplatelet medications? No    Heart Disease? Denies     Lung Disease? Denies     Sleep Apnea? Denies     Diabetic? Denies     Kidney Disease? Stage 3    Electronic Implantable Devices? Denies     PTSD? N/a     Prep instructions reviewed with patient? Note to  about how to prep. policy, MAC sedation plan reviewed. Instructed patient to have someone stay with her for 24 hours post exam    : Yes    Pharmacy: N/a    Indication for Procedure:Anastomotic stricture of colorectal region    Referring Provider: Hair Rivera MD    Arrival Time: 12 PM    COVID test? 7-27 [facility]    Carine Mason RN    Called patient and advised her Dr. Hung said no prep required for exam. Patient verbalized understanding.

## 2021-07-26 NOTE — PATIENT INSTRUCTIONS
Preparing for Your Surgery      Name:  Radha Jameson   MRN:  [id number]   :  1960   Today's Date:  2021       Arriving for surgery:  Surgery date:  8/10/21  Arrival time:  3 pm    Restrictions due to COVID 19:       One visitor is allowed in the Pre Op area. When you go into surgery, one visitor is allowed to wait in the Surgery Waiting Room       (provided there is enough space to social distance).   After surgery- Two visitors are allowed at a time if you have a private room and one visitor is allowed for those in a semi-private room.   Every 4 days the visitor(s) can rotate. During the 4 day period, the visitor(s) must be consistent. No visitors under the age of 18 years old.   Visiting Hours: 8 am - 8:30 pm   No ill visitors.   All visitors must wear face mask.     parking is available for anyone with mobility limitations or disabilities.  ([facility]  24 hours/ 7 days a week; [facility]  7 am- 3:30 pm, Mon- Fri)    Please come to:     [facility] [address]  [address] [place] [address]     -    On arrival to hospital, you will be asked some screening questions and directed to Patient Registration. Patient Registration will then give you further instructions.  [phone]?     - ?If you are in need of directions, wheelchair or escort please stop at the Information Desk in the lobby.  Inform the information person that you are here for surgery; a wheelchair and escort to [address] will be provided.?       OPTIMAL RECOVERY   Begin hydrating yourself by drinking at least 8-10 glasses of clear liquids for 24 hours before surgery:      Suggested clear liquids:   Water    Clear Juices   Clear Broth   Non- carbonated beverages    (Crystal Light or Stevo Aid)   Sodas    (Sprite, 7 up, ginger ale, seltzer)   Gatorade       We would like you to purchase a drink such as Gatorade (no red or purple) or Ensure Clear (not the milkshake type).   Drink this before bedtime and on the way into the hospital, drink between 8-10 ounces or until you feel hydrated.        Keeping well hydrated leads to your veins being plump, you wake up faster, and you are less likely to be nauseated. Start drinking water as soon as you can after surgery and advance to clear liquids and food as tolerated.  IV fluids contain salt, drinking fluids will minimize the amount of IV fluids you need and decrease the amount of salt you get.                 The most common reason for the patient to be readmitted is dehydration. Staying hydrated after you go home from the hospital is very important.  Ensure or Ensure Clear are good options to keep you hydrated.      What can I eat or drink?  -  You may eat and drink normally for up to 8 hours before your surgery. (Until 8:55 am)  -  You may have clear liquids until 2 hours before surgery. (Until 2:55 pm)    Examples of clear liquids:  Water  Clear broth  Juices (apple, white grape, white cranberry  and cider) without pulp  Noncarbonated, powder based beverages  (lemonade and Stevo-Aid)  Sodas (Sprite, 7-Up, ginger ale and seltzer)  Coffee or tea (without milk or cream)  Gatorade    -  No Alcohol for at least 24 hours before surgery     Which medicines can I take?  Hold Multivitamins for 7 days before surgery.  Hold Supplements for 7 days before surgery.  Hold Ibuprofen (Advil, Motrin) for 1 day before surgery--unless otherwise directed by surgeon.  Hold Naproxen (Aleve) for 4 days before surgery.    Hold Aspirin for 5 days prior to surgery. Take the last dose of Aspirin on 8-4-21 and hold until surgery.    -  DO NOT take these medications the day of surgery:  Biotin, Cholecalciferol,     -  PLEASE TAKE these medications the day of surgery:  Buspirone (Buspar), Carvedilol (Coreg), Dutasteride (Avodart), Escitalopram (Lexapro), Finasteride (Proscar),   Acetaminophen (Tylenol) if needed      How do I prepare myself?  - Please take 2 showers  before surgery using Scrubcare or Hibiclens soap.    Use this soap only from the neck to your toes.     Leave the soap on your skin for one minute--then rinse thoroughly.      You may use your own shampoo and conditioner; no other hair products.   - Please remove all jewelry and body piercings.  - No lotions, deodorants or fragrance.  - No makeup or fingernail polish.   - Bring your ID and insurance card.    - All patients are required to have a Covid-19 test within 4 days of surgery/procedure.      -Patients will be contacted by the [facility] scheduling team within 1 week of surgery to make an appointment.      - Patients may call the Scheduling team at [phone] if they have not been scheduled within 4 days of  surgery.      ALL PATIENTS GOING HOME THE SAME DAY OF SURGERY ARE REQUIRED TO HAVE A RESPONSIBLE ADULT TO DRIVE AND BE IN ATTENDANCE WITH THEM FOR 24 HOURS FOLLOWING SURGERY.      Questions or Concerns:    - For any questions regarding the day of surgery or your hospital stay, please contact the Pre Admission Nursing Office at [phone].       - If you have health changes between today and your surgery please call your surgeon.       For questions after surgery please call your surgeons office.

## 2021-07-26 NOTE — ADDENDUM NOTE
Addendum  created 07/26/21 1034 by Patricia Coy PA-C    Clinical Note Signed, Diagnosis association updated, Level of Service modified, Order list changed, Visit diagnoses modified

## 2021-07-26 NOTE — LETTER
"2021       RE: Radha Jameson  [address]  [address]  [place] [address]     Dear Colleague,    Thank you for referring your patient, Radha Jameson, to the [facility] [place] [facility]. Please see a copy of my visit note below.    Colon and Rectal Surgery Follow-up Clinic Note  RE: Radha Jameson  : 1960  JU: 2021    DIAGNOSIS: 60 year old female with chronic ulcerative pancolitis refractory to medical management.  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  She underwent a total abdominal colectomy with end ileostomy in 10/2019. Completion proctectomy was performed in 2020, but given the size and amount of rectal varices the ileal pouch was aborted. She is now status post cystoscopy with bilateral ureteral stent insertion and injection of indocyanine green, laparoscopic robotic assisted pelvic dissection with mobilization of rectal stump, ileostomy takedown, laparoscopic robotic assisted ileal pouch-anal anastomosis, flexible pouchoscopy with CO2 insufflation, and diverting loop ileostomy on 6/10/21.    INTERVAL HISTORY: Denies increased pain, fevers, or chills. Tolerating diet well. Ileostomy functioning well and denies pouching issues. Off narcotic pain meds.    GGE (21):  Impression:  Unremarkable water soluble contrast enema, with patent ileal  pouch-anal anastomosis without extraluminal extravasation of contrast.  No evidence of significant focal stricture.    Physical Examination:  /66 (BP Location: Left arm, Patient Position: Sitting, Cuff Size: Adult Regular)   Pulse 56   Temp 97.4  F (36.3  C) (Oral)   Ht 5' 9\"   Wt 157 lb 9.6 oz   SpO2 100%   BMI 23.27 kg/m    Abdomen soft, nontender.  Right-sided ileostomy viable and functioning. Incisions with no evidence of infection or hernia.  Perianal skin " "intact.  Digital rectal exam: No masses palpated. Ileoanal anastomosis just above the dentate line with stricture.  I could not pass my index finger completely past the ileoanal anastomosis mainly because of lack of reach.   Flexible sigmoidoscopy: after obtaining informed consent and performing a \"time out\", an adult flexible sigmoidoscope was introduced through the anus and passed up to the ileoanal anastomosis which is located just above the dentate line.  There was only a pinpoint opening at the anastomosis that I could not distend or push through with the adult sigmoidoscope.  This was causing pressure for the patient and her ileostomy bag was filling up with air.  Otherwise the rectal stump looked healthy with no evidence of ulceration or neoplasia.  The quality of the prep was good. Findings: 12. There was no active ulceration, inflammation or bleeding. No additional abnormalities were seen. Total scope time: 12 minutes. The patient tolerated the procedure well.    ASSESSMENT  Doing well postop.  Strictured ileoanal anastomosis.    PLAN  1.  Low residue diet.  2.  No activity restrictions.  3.  Routine stoma cares.  4.  Schedule flexible pouchoscopy with dilation at [facility].    5.  As long as I can successfully dilate the ileoanal anastomosis, schedule diverting loop ileostomy takedown.  Will need PAC and labs.    30 minutes spent on the date of the encounter doing chart review, history and exam, documentation and further activities as noted above.    Uriel Hung M.D., M.Sc.     Division of Colon and Rectal Surgery  [facility]    Referring Provider:  Jeff Silva MD  [facility]  [address]  [address]  [place] [address]      Primary Care Provider:  Hair Rivera     CC:  Sage Palma DO Hunt, Jennifer L, MD    Again, thank you for allowing me to participate in the care of your patient.      Sincerely,  Uriel Fenton" MD Abhilash

## 2021-07-26 NOTE — TELEPHONE ENCOUNTER
"Screening Questions  1. Are you active on mychart?Y    2. What insurance is in the chart?     2.  Ordering/Referring Provider:     3. BMI : 5'9\"/157=23.2    4. Are you on daily home oxygen? N    5. Do you have a history of difficult airway? N    6. Have you had a heart, lung, or liver transplant? N    7. Are you currently on dialysis? N    8. Have you had a stroke or Transient ischemic atttack (TIA) within 6 months? N    9. In the past 6 months, have you had any heart related issues including cardiomyopathy or heart attack?         If yes, did it require cardiac stenting or other implantable device?N    10. Do you have any implantable devices in your body (pacemaker, defib, LVAD)? N    11. Do you take nitroglycerin? If yes, how often? N    12. Are you currently taking any blood thinners?aspririn    13. Are you a diabetic? N    14. (Females) Are you currently pregnant? N  If yes, how many weeks?    15. Have you had a procedure in the past that was difficult to tolerate with conscious sedation? Any allergies to Fentanyl or Versed : N; only nausea    16. Are you taking any scheduled prescription narcotics more than once daily? N    17. Do you have any chemical dependencies such as alcohol, street drugs, or methadone? N    18. Do you have any history of post-traumatic stress syndrome or mental health issues? N    19. Do you transfer independently? Y    20.  Do you have any issues with constipation? : N    21. Preferred Pharmacy for Pre Prescription [id number] - [place], [place] - [address] AT [facility] [address]    Scheduling Details    Colonoscopy Prep Sent?: 7/26  Procedure Scheduled: flex sig-MAC  Provider/Surgeon: EDDIE  Date of Procedure: 7/30  Location: [facility]  Caller (Please ask for phone number if not scheduled by patient): PATIENT      Sedation Type: MAC  Conscious Sedation- Needs  for 6 hours after the procedure  MAC/General-Needs  for 24 hours after procedure    Pre-op " Required at [facility] and OR for MAC sedation:   (if yes advise patient they will need a pre-op prior to procedure)      Is patient on blood thinners? -BABY ASPIRIN (If yes- inform patient to follow up with PCP or provider for follow up instructions)     Informed patient they will need an adult  Y  Cannot take any type of public or medical transportation alone    Informed Patient of COVID Test Requirement     Confirmed Nurse will call to complete assessment Y    Additional comments:

## 2021-07-26 NOTE — PROGRESS NOTES
"Colon and Rectal Surgery Follow-up Clinic Note      RE: Radha Jameson  : 1960  JU: 2021    DIAGNOSIS: 60 year old female with chronic ulcerative pancolitis refractory to medical management.  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  She underwent a total abdominal colectomy with end ileostomy in 10/2019. Completion proctectomy was performed in 2020, but given the size and amount of rectal varices the ileal pouch was aborted. She is now status post cystoscopy with bilateral ureteral stent insertion and injection of indocyanine green, laparoscopic robotic assisted pelvic dissection with mobilization of rectal stump, ileostomy takedown, laparoscopic robotic assisted ileal pouch-anal anastomosis, flexible pouchoscopy with CO2 insufflation, and diverting loop ileostomy on 6/10/21.    INTERVAL HISTORY: Denies increased pain, fevers, or chills. Tolerating diet well. Ileostomy functioning well and denies pouching issues. Off narcotic pain meds.    GGE (21):  Impression:  Unremarkable water soluble contrast enema, with patent ileal  pouch-anal anastomosis without extraluminal extravasation of contrast.  No evidence of significant focal stricture.    Physical Examination:  /66 (BP Location: Left arm, Patient Position: Sitting, Cuff Size: Adult Regular)   Pulse 56   Temp 97.4  F (36.3  C) (Oral)   Ht 5' 9\"   Wt 157 lb 9.6 oz   SpO2 100%   BMI 23.27 kg/m    Abdomen soft, nontender.  Right-sided ileostomy viable and functioning. Incisions with no evidence of infection or hernia.  Perianal skin intact.  Digital rectal exam: No masses palpated. Ileoanal anastomosis just above the dentate line with stricture.  I could not pass my index finger completely past the ileoanal anastomosis mainly because of lack of reach.   Flexible sigmoidoscopy: after obtaining informed consent and performing a \"time out\", an adult flexible sigmoidoscope was " introduced through the anus and passed up to the ileoanal anastomosis which is located just above the dentate line.  There was only a pinpoint opening at the anastomosis that I could not distend or push through with the adult sigmoidoscope.  This was causing pressure for the patient and her ileostomy bag was filling up with air.  Otherwise the rectal stump looked healthy with no evidence of ulceration or neoplasia.  The quality of the prep was good. Findings: 12. There was no active ulceration, inflammation or bleeding. No additional abnormalities were seen. Total scope time: 12 minutes. The patient tolerated the procedure well.    ASSESSMENT  Doing well postop.  Strictured ileoanal anastomosis.    PLAN  1.  Low residue diet.  2.  No activity restrictions.  3.  Routine stoma cares.  4.  Schedule flexible pouchoscopy with dilation at [facility].    5.  As long as I can successfully dilate the ileoanal anastomosis, schedule diverting loop ileostomy takedown.  Will need PAC and labs.    30 minutes spent on the date of the encounter doing chart review, history and exam, documentation and further activities as noted above.    Uriel Hung M.D., M.Sc.     Division of Colon and Rectal Surgery  [facility]    Referring Provider:  Jeff Silva MD  [facility]  [address]  [address]  [place] [address]      Primary Care Provider:  Hair Rivera     CC:  Sage Palma DO Hunt, Jennifer L, MD   Quality 226: Preventive Care And Screening: Tobacco Use: Screening And Cessation Intervention: Patient screened for tobacco use and is an ex/non-smoker Detail Level: Detailed Quality 130: Documentation Of Current Medications In The Medical Record: Current Medications Documented Quality 431: Preventive Care And Screening: Unhealthy Alcohol Use - Screening: Patient screened for unhealthy alcohol use using a single question and scores less than 2 times per year

## 2021-07-27 ENCOUNTER — LAB (OUTPATIENT)
Dept: LAB | Facility: CLINIC | Age: 61
End: 2021-07-27
Payer: COMMERCIAL

## 2021-07-27 DIAGNOSIS — Z11.59 ENCOUNTER FOR SCREENING FOR OTHER VIRAL DISEASES: ICD-10-CM

## 2021-07-27 LAB — SARS-COV-2 RNA RESP QL NAA+PROBE: NEGATIVE

## 2021-07-27 PROCEDURE — U0005 INFEC AGEN DETEC AMPLI PROBE: HCPCS | Performed by: PATHOLOGY

## 2021-07-27 PROCEDURE — U0003 INFECTIOUS AGENT DETECTION BY NUCLEIC ACID (DNA OR RNA); SEVERE ACUTE RESPIRATORY SYNDROME CORONAVIRUS 2 (SARS-COV-2) (CORONAVIRUS DISEASE [COVID-19]), AMPLIFIED PROBE TECHNIQUE, MAKING USE OF HIGH THROUGHPUT TECHNOLOGIES AS DESCRIBED BY CMS-2020-01-R: HCPCS | Performed by: PATHOLOGY

## 2021-07-29 ENCOUNTER — TEAM CONFERENCE (OUTPATIENT)
Dept: SURGERY | Facility: CLINIC | Age: 61
End: 2021-07-29

## 2021-07-29 NOTE — PROGRESS NOTES
COLON AND RECTAL SURGERY HUDDLE:    Patient was reviewed in preporation for their surgery the following was reviewed and has been completed:    Surgeon: Dr. Uriel Hung    Surgery & Date: 7/26/2021  CLOSURE, ILEOSTOMY, LAPAROSCOPIC    Last MD Note: reviewed    Anesthesia Type: General    Other Providers: No    PAC: Yes    WOC: N/A    Labs: Yes    Bowel Prep: Yes Clear Liquid    Packet: Yes    Imaging: Yes--- Flex sig with dilation scheduled for 7/30    Post-Op Appointments: Yes    COVID: yes

## 2021-07-30 ENCOUNTER — TRANSFERRED RECORDS (OUTPATIENT)
Dept: HEALTH INFORMATION MANAGEMENT | Facility: CLINIC | Age: 61
End: 2021-07-30

## 2021-07-30 ENCOUNTER — DOCUMENTATION ONLY (OUTPATIENT)
Dept: GASTROENTEROLOGY | Facility: OUTPATIENT CENTER | Age: 61
End: 2021-07-30
Payer: COMMERCIAL

## 2021-07-30 ENCOUNTER — TELEPHONE (OUTPATIENT)
Dept: GASTROENTEROLOGY | Facility: OUTPATIENT CENTER | Age: 61
End: 2021-07-30

## 2021-07-30 DIAGNOSIS — K91.30 ANASTOMOTIC STRICTURE OF COLORECTAL REGION: Primary | ICD-10-CM

## 2021-07-30 DIAGNOSIS — Z11.59 ENCOUNTER FOR SCREENING FOR OTHER VIRAL DISEASES: ICD-10-CM

## 2021-07-30 NOTE — TELEPHONE ENCOUNTER
Order notes:  flex sig with balloon dilation for anastomotic stricture    Scheduled at [facility] with Abhilash. No prep per patient. Covid test scheduled.

## 2021-08-02 ENCOUNTER — LAB (OUTPATIENT)
Dept: LAB | Facility: CLINIC | Age: 61
End: 2021-08-02
Payer: COMMERCIAL

## 2021-08-02 DIAGNOSIS — Z11.59 ENCOUNTER FOR SCREENING FOR OTHER VIRAL DISEASES: ICD-10-CM

## 2021-08-02 LAB — SARS-COV-2 RNA RESP QL NAA+PROBE: NEGATIVE

## 2021-08-02 PROCEDURE — U0005 INFEC AGEN DETEC AMPLI PROBE: HCPCS

## 2021-08-02 PROCEDURE — U0003 INFECTIOUS AGENT DETECTION BY NUCLEIC ACID (DNA OR RNA); SEVERE ACUTE RESPIRATORY SYNDROME CORONAVIRUS 2 (SARS-COV-2) (CORONAVIRUS DISEASE [COVID-19]), AMPLIFIED PROBE TECHNIQUE, MAKING USE OF HIGH THROUGHPUT TECHNOLOGIES AS DESCRIBED BY CMS-2020-01-R: HCPCS

## 2021-08-04 ENCOUNTER — TRANSFERRED RECORDS (OUTPATIENT)
Dept: HEALTH INFORMATION MANAGEMENT | Facility: CLINIC | Age: 61
End: 2021-08-04

## 2021-08-04 ENCOUNTER — DOCUMENTATION ONLY (OUTPATIENT)
Dept: GASTROENTEROLOGY | Facility: OUTPATIENT CENTER | Age: 61
End: 2021-08-04
Payer: COMMERCIAL

## 2021-08-04 DIAGNOSIS — K91.30 ANASTOMOTIC STRICTURE OF COLORECTAL REGION: ICD-10-CM

## 2021-08-06 ENCOUNTER — LAB (OUTPATIENT)
Dept: LAB | Facility: CLINIC | Age: 61
End: 2021-08-06
Attending: COLON & RECTAL SURGERY
Payer: COMMERCIAL

## 2021-08-06 DIAGNOSIS — Z11.59 ENCOUNTER FOR SCREENING FOR OTHER VIRAL DISEASES: ICD-10-CM

## 2021-08-06 LAB — SARS-COV-2 RNA RESP QL NAA+PROBE: NEGATIVE

## 2021-08-06 PROCEDURE — U0005 INFEC AGEN DETEC AMPLI PROBE: HCPCS | Performed by: PATHOLOGY

## 2021-08-06 PROCEDURE — U0003 INFECTIOUS AGENT DETECTION BY NUCLEIC ACID (DNA OR RNA); SEVERE ACUTE RESPIRATORY SYNDROME CORONAVIRUS 2 (SARS-COV-2) (CORONAVIRUS DISEASE [COVID-19]), AMPLIFIED PROBE TECHNIQUE, MAKING USE OF HIGH THROUGHPUT TECHNOLOGIES AS DESCRIBED BY CMS-2020-01-R: HCPCS | Performed by: PATHOLOGY

## 2021-08-09 ENCOUNTER — PREP FOR PROCEDURE (OUTPATIENT)
Dept: SURGERY | Facility: CLINIC | Age: 61
End: 2021-08-09

## 2021-08-09 ENCOUNTER — ANESTHESIA EVENT (OUTPATIENT)
Dept: SURGERY | Facility: CLINIC | Age: 61
DRG: 330 | End: 2021-08-09
Payer: COMMERCIAL

## 2021-08-09 NOTE — PROGRESS NOTES
"Case Mod entered to change to standard Ileostomy Closure (removed word \"laparoscopic\"), per Dr. Hung. Called into OR Scheduling, change made.  "

## 2021-08-10 ENCOUNTER — ANESTHESIA (OUTPATIENT)
Dept: SURGERY | Facility: CLINIC | Age: 61
DRG: 330 | End: 2021-08-10
Payer: COMMERCIAL

## 2021-08-10 ENCOUNTER — APPOINTMENT (OUTPATIENT)
Dept: OCCUPATIONAL THERAPY | Facility: CLINIC | Age: 61
DRG: 330 | End: 2021-08-10
Attending: COLON & RECTAL SURGERY
Payer: COMMERCIAL

## 2021-08-10 ENCOUNTER — HOSPITAL ENCOUNTER (INPATIENT)
Facility: CLINIC | Age: 61
LOS: 1 days | Discharge: HOME | DRG: 330 | End: 2021-08-11
Attending: COLON & RECTAL SURGERY | Admitting: COLON & RECTAL SURGERY
Payer: COMMERCIAL

## 2021-08-10 DIAGNOSIS — Z09 FOLLOW-UP EXAMINATION AFTER COLORECTAL SURGERY: ICD-10-CM

## 2021-08-10 DIAGNOSIS — K51.019 ULCERATIVE PANCOLITIS WITH COMPLICATION (H): Primary | ICD-10-CM

## 2021-08-10 DIAGNOSIS — Z93.2 ILEOSTOMY STATUS (H): ICD-10-CM

## 2021-08-10 LAB
CREAT SERPL-MCNC: 3.26 MG/DL (ref 0.52–1.04)
GFR SERPL CREATININE-BSD FRML MDRD: 15 ML/MIN/1.73M2
GLUCOSE BLDC GLUCOMTR-MCNC: 71 MG/DL (ref 70–99)
MAGNESIUM SERPL-MCNC: 1.4 MG/DL (ref 1.6–2.3)
PHOSPHATE SERPL-MCNC: 4.1 MG/DL (ref 2.5–4.5)
POTASSIUM BLD-SCNC: 5.4 MMOL/L (ref 3.4–5.3)

## 2021-08-10 PROCEDURE — 250N000011 HC RX IP 250 OP 636: Performed by: COLON & RECTAL SURGERY

## 2021-08-10 PROCEDURE — 370N000017 HC ANESTHESIA TECHNICAL FEE, PER MIN: Performed by: COLON & RECTAL SURGERY

## 2021-08-10 PROCEDURE — 36415 COLL VENOUS BLD VENIPUNCTURE: CPT | Performed by: COLON & RECTAL SURGERY

## 2021-08-10 PROCEDURE — 360N000077 HC SURGERY LEVEL 4, PER MIN: Performed by: COLON & RECTAL SURGERY

## 2021-08-10 PROCEDURE — 0DBB0ZZ EXCISION OF ILEUM, OPEN APPROACH: ICD-10-PCS | Performed by: COLON & RECTAL SURGERY

## 2021-08-10 PROCEDURE — 83735 ASSAY OF MAGNESIUM: CPT | Performed by: COLON & RECTAL SURGERY

## 2021-08-10 PROCEDURE — 97110 THERAPEUTIC EXERCISES: CPT | Mod: GO | Performed by: OCCUPATIONAL THERAPIST

## 2021-08-10 PROCEDURE — 710N000010 HC RECOVERY PHASE 1, LEVEL 2, PER MIN: Performed by: COLON & RECTAL SURGERY

## 2021-08-10 PROCEDURE — 250N000011 HC RX IP 250 OP 636: Performed by: ANESTHESIOLOGY

## 2021-08-10 PROCEDURE — 97535 SELF CARE MNGMENT TRAINING: CPT | Mod: GO | Performed by: OCCUPATIONAL THERAPIST

## 2021-08-10 PROCEDURE — C1765 ADHESION BARRIER: HCPCS | Performed by: COLON & RECTAL SURGERY

## 2021-08-10 PROCEDURE — 250N000011 HC RX IP 250 OP 636: Performed by: STUDENT IN AN ORGANIZED HEALTH CARE EDUCATION/TRAINING PROGRAM

## 2021-08-10 PROCEDURE — 272N000001 HC OR GENERAL SUPPLY STERILE: Performed by: COLON & RECTAL SURGERY

## 2021-08-10 PROCEDURE — 97530 THERAPEUTIC ACTIVITIES: CPT | Mod: GO | Performed by: OCCUPATIONAL THERAPIST

## 2021-08-10 PROCEDURE — 44625 REPAIR BOWEL OPENING: CPT | Mod: 58 | Performed by: COLON & RECTAL SURGERY

## 2021-08-10 PROCEDURE — 250N000013 HC RX MED GY IP 250 OP 250 PS 637: Performed by: COLON & RECTAL SURGERY

## 2021-08-10 PROCEDURE — 250N000009 HC RX 250: Performed by: STUDENT IN AN ORGANIZED HEALTH CARE EDUCATION/TRAINING PROGRAM

## 2021-08-10 PROCEDURE — 82565 ASSAY OF CREATININE: CPT | Performed by: COLON & RECTAL SURGERY

## 2021-08-10 PROCEDURE — 258N000003 HC RX IP 258 OP 636: Performed by: STUDENT IN AN ORGANIZED HEALTH CARE EDUCATION/TRAINING PROGRAM

## 2021-08-10 PROCEDURE — 84132 ASSAY OF SERUM POTASSIUM: CPT | Performed by: COLON & RECTAL SURGERY

## 2021-08-10 PROCEDURE — 999N000141 HC STATISTIC PRE-PROCEDURE NURSING ASSESSMENT: Performed by: COLON & RECTAL SURGERY

## 2021-08-10 PROCEDURE — 258N000003 HC RX IP 258 OP 636: Performed by: COLON & RECTAL SURGERY

## 2021-08-10 PROCEDURE — 250N000009 HC RX 250: Performed by: ANESTHESIOLOGY

## 2021-08-10 PROCEDURE — 250N000025 HC SEVOFLURANE, PER MIN: Performed by: COLON & RECTAL SURGERY

## 2021-08-10 PROCEDURE — 272N000002 HC OR SUPPLY OTHER OPNP: Performed by: COLON & RECTAL SURGERY

## 2021-08-10 PROCEDURE — 97165 OT EVAL LOW COMPLEX 30 MIN: CPT | Mod: GO | Performed by: OCCUPATIONAL THERAPIST

## 2021-08-10 PROCEDURE — 0WQF0ZZ REPAIR ABDOMINAL WALL, OPEN APPROACH: ICD-10-PCS | Performed by: COLON & RECTAL SURGERY

## 2021-08-10 PROCEDURE — 120N000002 HC R&B MED SURG/OB UMMC

## 2021-08-10 PROCEDURE — 84100 ASSAY OF PHOSPHORUS: CPT | Performed by: COLON & RECTAL SURGERY

## 2021-08-10 PROCEDURE — C9290 INJ, BUPIVACAINE LIPOSOME: HCPCS | Performed by: ANESTHESIOLOGY

## 2021-08-10 RX ORDER — HALOPERIDOL 5 MG/ML
1 INJECTION INTRAMUSCULAR
Status: DISCONTINUED | OUTPATIENT
Start: 2021-08-10 | End: 2021-08-10 | Stop reason: HOSPADM

## 2021-08-10 RX ORDER — CARVEDILOL 12.5 MG/1
25 TABLET ORAL 2 TIMES DAILY WITH MEALS
Status: DISCONTINUED | OUTPATIENT
Start: 2021-08-11 | End: 2021-08-11 | Stop reason: HOSPADM

## 2021-08-10 RX ORDER — ONDANSETRON 4 MG/1
4 TABLET, ORALLY DISINTEGRATING ORAL EVERY 30 MIN PRN
Status: DISCONTINUED | OUTPATIENT
Start: 2021-08-10 | End: 2021-08-10 | Stop reason: HOSPADM

## 2021-08-10 RX ORDER — LORAZEPAM 2 MG/ML
.5-1 INJECTION INTRAMUSCULAR
Status: DISCONTINUED | OUTPATIENT
Start: 2021-08-10 | End: 2021-08-10 | Stop reason: HOSPADM

## 2021-08-10 RX ORDER — OXYCODONE HYDROCHLORIDE 5 MG/1
5 TABLET ORAL EVERY 4 HOURS PRN
Status: DISCONTINUED | OUTPATIENT
Start: 2021-08-10 | End: 2021-08-10

## 2021-08-10 RX ORDER — PROPOFOL 10 MG/ML
INJECTION, EMULSION INTRAVENOUS PRN
Status: DISCONTINUED | OUTPATIENT
Start: 2021-08-10 | End: 2021-08-10

## 2021-08-10 RX ORDER — ONDANSETRON 2 MG/ML
4 INJECTION INTRAMUSCULAR; INTRAVENOUS EVERY 30 MIN PRN
Status: DISCONTINUED | OUTPATIENT
Start: 2021-08-10 | End: 2021-08-10 | Stop reason: HOSPADM

## 2021-08-10 RX ORDER — DIPHENHYDRAMINE HCL 25 MG
25 CAPSULE ORAL EVERY 6 HOURS PRN
Status: DISCONTINUED | OUTPATIENT
Start: 2021-08-10 | End: 2021-08-10 | Stop reason: HOSPADM

## 2021-08-10 RX ORDER — LORAZEPAM 2 MG/ML
0.5 INJECTION INTRAMUSCULAR EVERY 6 HOURS PRN
Status: DISCONTINUED | OUTPATIENT
Start: 2021-08-10 | End: 2021-08-11 | Stop reason: HOSPADM

## 2021-08-10 RX ORDER — FINASTERIDE 5 MG/1
5 TABLET, FILM COATED ORAL DAILY
Status: DISCONTINUED | OUTPATIENT
Start: 2021-08-11 | End: 2021-08-11 | Stop reason: HOSPADM

## 2021-08-10 RX ORDER — LIDOCAINE 40 MG/G
CREAM TOPICAL
Status: DISCONTINUED | OUTPATIENT
Start: 2021-08-10 | End: 2021-08-11 | Stop reason: HOSPADM

## 2021-08-10 RX ORDER — FENTANYL CITRATE 50 UG/ML
INJECTION, SOLUTION INTRAMUSCULAR; INTRAVENOUS PRN
Status: DISCONTINUED | OUTPATIENT
Start: 2021-08-10 | End: 2021-08-10

## 2021-08-10 RX ORDER — SODIUM CHLORIDE, SODIUM LACTATE, POTASSIUM CHLORIDE, CALCIUM CHLORIDE 600; 310; 30; 20 MG/100ML; MG/100ML; MG/100ML; MG/100ML
INJECTION, SOLUTION INTRAVENOUS CONTINUOUS PRN
Status: DISCONTINUED | OUTPATIENT
Start: 2021-08-10 | End: 2021-08-10

## 2021-08-10 RX ORDER — HYDROMORPHONE HCL IN WATER/PF 6 MG/30 ML
0.2 PATIENT CONTROLLED ANALGESIA SYRINGE INTRAVENOUS
Status: DISCONTINUED | OUTPATIENT
Start: 2021-08-10 | End: 2021-08-11 | Stop reason: HOSPADM

## 2021-08-10 RX ORDER — DEXAMETHASONE SODIUM PHOSPHATE 4 MG/ML
INJECTION, SOLUTION INTRA-ARTICULAR; INTRALESIONAL; INTRAMUSCULAR; INTRAVENOUS; SOFT TISSUE PRN
Status: DISCONTINUED | OUTPATIENT
Start: 2021-08-10 | End: 2021-08-10

## 2021-08-10 RX ORDER — LIDOCAINE HYDROCHLORIDE 20 MG/ML
INJECTION, SOLUTION INFILTRATION; PERINEURAL PRN
Status: DISCONTINUED | OUTPATIENT
Start: 2021-08-10 | End: 2021-08-10

## 2021-08-10 RX ORDER — OXYCODONE HYDROCHLORIDE 5 MG/1
5 TABLET ORAL EVERY 4 HOURS PRN
Status: DISCONTINUED | OUTPATIENT
Start: 2021-08-10 | End: 2021-08-11 | Stop reason: HOSPADM

## 2021-08-10 RX ORDER — DUTASTERIDE 0.5 MG/1
0.5 CAPSULE, LIQUID FILLED ORAL 2 TIMES DAILY
Status: DISCONTINUED | OUTPATIENT
Start: 2021-08-11 | End: 2021-08-11 | Stop reason: HOSPADM

## 2021-08-10 RX ORDER — NORTRIPTYLINE HCL 25 MG
25 CAPSULE ORAL AT BEDTIME
Status: DISCONTINUED | OUTPATIENT
Start: 2021-08-10 | End: 2021-08-11 | Stop reason: HOSPADM

## 2021-08-10 RX ORDER — HYDROMORPHONE HCL IN WATER/PF 6 MG/30 ML
0.4 PATIENT CONTROLLED ANALGESIA SYRINGE INTRAVENOUS
Status: DISCONTINUED | OUTPATIENT
Start: 2021-08-10 | End: 2021-08-11 | Stop reason: HOSPADM

## 2021-08-10 RX ORDER — SCOLOPAMINE TRANSDERMAL SYSTEM 1 MG/1
1 PATCH, EXTENDED RELEASE TRANSDERMAL ONCE
Status: COMPLETED | OUTPATIENT
Start: 2021-08-10 | End: 2021-08-11

## 2021-08-10 RX ORDER — BUSPIRONE HYDROCHLORIDE 5 MG/1
10 TABLET ORAL 2 TIMES DAILY
Status: DISCONTINUED | OUTPATIENT
Start: 2021-08-11 | End: 2021-08-11 | Stop reason: HOSPADM

## 2021-08-10 RX ORDER — SODIUM CHLORIDE, SODIUM LACTATE, POTASSIUM CHLORIDE, CALCIUM CHLORIDE 600; 310; 30; 20 MG/100ML; MG/100ML; MG/100ML; MG/100ML
INJECTION, SOLUTION INTRAVENOUS CONTINUOUS
Status: DISCONTINUED | OUTPATIENT
Start: 2021-08-10 | End: 2021-08-10 | Stop reason: HOSPADM

## 2021-08-10 RX ORDER — MEPERIDINE HYDROCHLORIDE 25 MG/ML
12.5 INJECTION INTRAMUSCULAR; INTRAVENOUS; SUBCUTANEOUS EVERY 5 MIN PRN
Status: DISCONTINUED | OUTPATIENT
Start: 2021-08-10 | End: 2021-08-10 | Stop reason: HOSPADM

## 2021-08-10 RX ORDER — NALOXONE HYDROCHLORIDE 0.4 MG/ML
0.2 INJECTION, SOLUTION INTRAMUSCULAR; INTRAVENOUS; SUBCUTANEOUS
Status: DISCONTINUED | OUTPATIENT
Start: 2021-08-10 | End: 2021-08-10 | Stop reason: HOSPADM

## 2021-08-10 RX ORDER — FLUMAZENIL 0.1 MG/ML
0.2 INJECTION, SOLUTION INTRAVENOUS
Status: DISCONTINUED | OUTPATIENT
Start: 2021-08-10 | End: 2021-08-10 | Stop reason: HOSPADM

## 2021-08-10 RX ORDER — BUPIVACAINE HYDROCHLORIDE 2.5 MG/ML
INJECTION, SOLUTION EPIDURAL; INFILTRATION; INTRACAUDAL
Status: COMPLETED | OUTPATIENT
Start: 2021-08-10 | End: 2021-08-10

## 2021-08-10 RX ORDER — NALOXONE HYDROCHLORIDE 0.4 MG/ML
0.4 INJECTION, SOLUTION INTRAMUSCULAR; INTRAVENOUS; SUBCUTANEOUS
Status: DISCONTINUED | OUTPATIENT
Start: 2021-08-10 | End: 2021-08-11 | Stop reason: HOSPADM

## 2021-08-10 RX ORDER — NALOXONE HYDROCHLORIDE 0.4 MG/ML
0.2 INJECTION, SOLUTION INTRAMUSCULAR; INTRAVENOUS; SUBCUTANEOUS
Status: DISCONTINUED | OUTPATIENT
Start: 2021-08-10 | End: 2021-08-11 | Stop reason: HOSPADM

## 2021-08-10 RX ORDER — LABETALOL HYDROCHLORIDE 5 MG/ML
10 INJECTION, SOLUTION INTRAVENOUS
Status: DISCONTINUED | OUTPATIENT
Start: 2021-08-10 | End: 2021-08-10 | Stop reason: HOSPADM

## 2021-08-10 RX ORDER — HYDRALAZINE HYDROCHLORIDE 20 MG/ML
2.5-5 INJECTION INTRAMUSCULAR; INTRAVENOUS EVERY 10 MIN PRN
Status: DISCONTINUED | OUTPATIENT
Start: 2021-08-10 | End: 2021-08-10 | Stop reason: HOSPADM

## 2021-08-10 RX ORDER — GRANISETRON HYDROCHLORIDE 1 MG/ML
1 INJECTION INTRAVENOUS ONCE
Status: COMPLETED | OUTPATIENT
Start: 2021-08-10 | End: 2021-08-10

## 2021-08-10 RX ORDER — OXYCODONE HYDROCHLORIDE 10 MG/1
10 TABLET ORAL EVERY 4 HOURS PRN
Status: DISCONTINUED | OUTPATIENT
Start: 2021-08-10 | End: 2021-08-11 | Stop reason: HOSPADM

## 2021-08-10 RX ORDER — ESCITALOPRAM OXALATE 20 MG/1
20 TABLET ORAL EVERY MORNING
Status: DISCONTINUED | OUTPATIENT
Start: 2021-08-11 | End: 2021-08-11 | Stop reason: HOSPADM

## 2021-08-10 RX ORDER — NALOXONE HYDROCHLORIDE 0.4 MG/ML
0.4 INJECTION, SOLUTION INTRAMUSCULAR; INTRAVENOUS; SUBCUTANEOUS
Status: DISCONTINUED | OUTPATIENT
Start: 2021-08-10 | End: 2021-08-10 | Stop reason: HOSPADM

## 2021-08-10 RX ORDER — SODIUM CHLORIDE, SODIUM LACTATE, POTASSIUM CHLORIDE, CALCIUM CHLORIDE 600; 310; 30; 20 MG/100ML; MG/100ML; MG/100ML; MG/100ML
INJECTION, SOLUTION INTRAVENOUS CONTINUOUS
Status: DISCONTINUED | OUTPATIENT
Start: 2021-08-10 | End: 2021-08-11

## 2021-08-10 RX ORDER — KETOROLAC TROMETHAMINE 15 MG/ML
15 INJECTION, SOLUTION INTRAMUSCULAR; INTRAVENOUS EVERY 6 HOURS PRN
Status: DISCONTINUED | OUTPATIENT
Start: 2021-08-10 | End: 2021-08-10

## 2021-08-10 RX ORDER — ACETAMINOPHEN 325 MG/1
975 TABLET ORAL ONCE
Status: COMPLETED | OUTPATIENT
Start: 2021-08-10 | End: 2021-08-10

## 2021-08-10 RX ORDER — DIPHENHYDRAMINE HYDROCHLORIDE 50 MG/ML
25 INJECTION INTRAMUSCULAR; INTRAVENOUS EVERY 6 HOURS PRN
Status: DISCONTINUED | OUTPATIENT
Start: 2021-08-10 | End: 2021-08-10 | Stop reason: HOSPADM

## 2021-08-10 RX ORDER — FENTANYL CITRATE 50 UG/ML
25-50 INJECTION, SOLUTION INTRAMUSCULAR; INTRAVENOUS EVERY 5 MIN PRN
Status: DISCONTINUED | OUTPATIENT
Start: 2021-08-10 | End: 2021-08-10 | Stop reason: HOSPADM

## 2021-08-10 RX ORDER — FENTANYL CITRATE 50 UG/ML
25-50 INJECTION, SOLUTION INTRAMUSCULAR; INTRAVENOUS
Status: DISCONTINUED | OUTPATIENT
Start: 2021-08-10 | End: 2021-08-10 | Stop reason: HOSPADM

## 2021-08-10 RX ORDER — MAGNESIUM SULFATE HEPTAHYDRATE 40 MG/ML
2 INJECTION, SOLUTION INTRAVENOUS ONCE
Status: COMPLETED | OUTPATIENT
Start: 2021-08-10 | End: 2021-08-10

## 2021-08-10 RX ORDER — ACETAMINOPHEN 500 MG
1000 TABLET ORAL 4 TIMES DAILY
Status: DISCONTINUED | OUTPATIENT
Start: 2021-08-10 | End: 2021-08-11

## 2021-08-10 RX ORDER — ALBUTEROL SULFATE 0.83 MG/ML
2.5 SOLUTION RESPIRATORY (INHALATION) EVERY 4 HOURS PRN
Status: DISCONTINUED | OUTPATIENT
Start: 2021-08-10 | End: 2021-08-10 | Stop reason: HOSPADM

## 2021-08-10 RX ORDER — HYDROMORPHONE HCL IN WATER/PF 6 MG/30 ML
.2-.4 PATIENT CONTROLLED ANALGESIA SYRINGE INTRAVENOUS EVERY 5 MIN PRN
Status: DISCONTINUED | OUTPATIENT
Start: 2021-08-10 | End: 2021-08-10 | Stop reason: HOSPADM

## 2021-08-10 RX ORDER — LIDOCAINE 40 MG/G
CREAM TOPICAL
Status: DISCONTINUED | OUTPATIENT
Start: 2021-08-10 | End: 2021-08-10 | Stop reason: HOSPADM

## 2021-08-10 RX ADMIN — FENTANYL CITRATE 50 MCG: 50 INJECTION, SOLUTION INTRAMUSCULAR; INTRAVENOUS at 07:34

## 2021-08-10 RX ADMIN — OXYCODONE HYDROCHLORIDE 5 MG: 5 TABLET ORAL at 21:12

## 2021-08-10 RX ADMIN — ROCURONIUM BROMIDE 40 MG: 10 INJECTION INTRAVENOUS at 08:16

## 2021-08-10 RX ADMIN — FENTANYL CITRATE 50 MCG: 50 INJECTION, SOLUTION INTRAMUSCULAR; INTRAVENOUS at 08:17

## 2021-08-10 RX ADMIN — ROCURONIUM BROMIDE 10 MG: 10 INJECTION INTRAVENOUS at 09:02

## 2021-08-10 RX ADMIN — BUPIVACAINE 20 ML: 13.3 INJECTION, SUSPENSION, LIPOSOMAL INFILTRATION at 07:45

## 2021-08-10 RX ADMIN — ACETAMINOPHEN 1000 MG: 500 TABLET, FILM COATED ORAL at 17:52

## 2021-08-10 RX ADMIN — FENTANYL CITRATE 50 MCG: 50 INJECTION, SOLUTION INTRAMUSCULAR; INTRAVENOUS at 08:39

## 2021-08-10 RX ADMIN — GRANISETRON HYDROCHLORIDE 1 MG: 1 INJECTION, SOLUTION INTRAVENOUS at 07:43

## 2021-08-10 RX ADMIN — MIDAZOLAM 1 MG: 1 INJECTION INTRAMUSCULAR; INTRAVENOUS at 07:42

## 2021-08-10 RX ADMIN — DEXAMETHASONE SODIUM PHOSPHATE 4 MG: 4 INJECTION, SOLUTION INTRA-ARTICULAR; INTRALESIONAL; INTRAMUSCULAR; INTRAVENOUS; SOFT TISSUE at 08:17

## 2021-08-10 RX ADMIN — MAGNESIUM SULFATE IN WATER 2 G: 40 INJECTION, SOLUTION INTRAVENOUS at 12:46

## 2021-08-10 RX ADMIN — MIDAZOLAM 1 MG: 1 INJECTION INTRAMUSCULAR; INTRAVENOUS at 07:34

## 2021-08-10 RX ADMIN — OXYCODONE HYDROCHLORIDE 5 MG: 5 TABLET ORAL at 16:16

## 2021-08-10 RX ADMIN — SUGAMMADEX 150 MG: 100 INJECTION, SOLUTION INTRAVENOUS at 09:31

## 2021-08-10 RX ADMIN — SCOPALAMINE 1 PATCH: 1 PATCH, EXTENDED RELEASE TRANSDERMAL at 07:11

## 2021-08-10 RX ADMIN — ENOXAPARIN SODIUM 40 MG: 40 INJECTION, SOLUTION INTRAVENOUS; SUBCUTANEOUS at 07:12

## 2021-08-10 RX ADMIN — LIDOCAINE HYDROCHLORIDE 70 MG: 20 INJECTION, SOLUTION INFILTRATION; PERINEURAL at 08:17

## 2021-08-10 RX ADMIN — ACETAMINOPHEN 975 MG: 325 TABLET, FILM COATED ORAL at 07:10

## 2021-08-10 RX ADMIN — SODIUM CHLORIDE, POTASSIUM CHLORIDE, SODIUM LACTATE AND CALCIUM CHLORIDE: 600; 310; 30; 20 INJECTION, SOLUTION INTRAVENOUS at 23:13

## 2021-08-10 RX ADMIN — ACETAMINOPHEN 1000 MG: 500 TABLET, FILM COATED ORAL at 12:45

## 2021-08-10 RX ADMIN — PROPOFOL 100 MG: 10 INJECTION, EMULSION INTRAVENOUS at 08:17

## 2021-08-10 RX ADMIN — ERTAPENEM SODIUM 0.5 G: 1 INJECTION, POWDER, LYOPHILIZED, FOR SOLUTION INTRAMUSCULAR; INTRAVENOUS at 08:23

## 2021-08-10 RX ADMIN — BUPIVACAINE HYDROCHLORIDE 20 ML: 2.5 INJECTION, SOLUTION EPIDURAL; INFILTRATION; INTRACAUDAL; PERINEURAL at 07:45

## 2021-08-10 RX ADMIN — NORTRIPTYLINE HYDROCHLORIDE 25 MG: 25 CAPSULE ORAL at 21:12

## 2021-08-10 RX ADMIN — SODIUM CHLORIDE, POTASSIUM CHLORIDE, SODIUM LACTATE AND CALCIUM CHLORIDE: 600; 310; 30; 20 INJECTION, SOLUTION INTRAVENOUS at 11:34

## 2021-08-10 RX ADMIN — PROPOFOL 30 MG: 10 INJECTION, EMULSION INTRAVENOUS at 08:21

## 2021-08-10 RX ADMIN — SODIUM CHLORIDE, POTASSIUM CHLORIDE, SODIUM LACTATE AND CALCIUM CHLORIDE: 600; 310; 30; 20 INJECTION, SOLUTION INTRAVENOUS at 08:06

## 2021-08-10 ASSESSMENT — ACTIVITIES OF DAILY LIVING (ADL)
ADLS_ACUITY_SCORE: 16
ADLS_ACUITY_SCORE: 16
ADLS_ACUITY_SCORE: 14

## 2021-08-10 ASSESSMENT — ENCOUNTER SYMPTOMS: SEIZURES: 0

## 2021-08-10 ASSESSMENT — MIFFLIN-ST. JEOR: SCORE: 1344.38

## 2021-08-10 ASSESSMENT — LIFESTYLE VARIABLES: TOBACCO_USE: 0

## 2021-08-10 NOTE — PROGRESS NOTES
"CLINICAL NUTRITION SERVICES - ASSESSMENT NOTE     Nutrition Prescription    RECOMMENDATIONS FOR MDs/PROVIDERS TO ORDER:  1. Recommend 2 gram K+/low fiber diet when medically appropriate if K+ level increased    Malnutrition Status:     Patient does not meet two of the established criteria necessary for diagnosing malnutrition    Recommendations already ordered by Registered Dietitian (RD):  None at this time     Future/Additional Recommendations:  -- monitor oral intake of meals and supplements  -- monitor labs and ability to modify nutritional supplement to ensure enlive once daily     REASON FOR ASSESSMENT  Radha Jameson is a/an 60 year old female assessed by the dietitian for Provider Order - Nutrition Education - low residue/low fiber diet education     NUTRITION HISTORY  Patient reports good appetite PTA. She does endorse some weight loss but states her appetite is unchanged. She likes ensure (chocolate flavor) for a nutritional supplement.  Radha reports she has followed a low fiber diet previously.     CURRENT NUTRITION ORDERS  Diet: Full Liquid with Nepro between meals     LABS  Labs reviewed  (8/10): K+ 5.4 mmol/L (H)    MEDICATIONS  Medications reviewed    ANTHROPOMETRICS  Height: 175.3 cm (5' 9\")  Most Recent Weight: 71 kg (156 lb 8.4 oz)    IBW: 65.9 kg  BMI: Normal BMI  Weight History:   Wt Readings from Last 10 Encounters:   08/10/21 71 kg (156 lb 8.4 oz)   07/26/21 71.5 kg (157 lb 9.6 oz)   07/26/21 71.7 kg (158 lb)   06/24/21 75.9 kg (167 lb 4.8 oz)   06/12/21 80.6 kg (177 lb 11.2 oz)   06/02/21 77.1 kg (170 lb)   5.6% weight loss in 1 month   Dosing Weight: 71 kg (admit weight)    ASSESSED NUTRITION NEEDS  Estimated Energy Needs: 8854-6843 kcals/day (25 - 30 kcals/kg)  Justification: Maintenance  Estimated Protein Needs:  grams protein/day (1.2 - 1.5 grams of pro/kg)  Justification: Post-op, pending renal function   Estimated Fluid Needs: 1 mL/kcal  Justification: Maintenance    PHYSICAL " FINDINGS  See malnutrition section below.    MALNUTRITION  % Intake: Decreased intake does not meet criteria  % Weight Loss: > 5% in 1 month (severe)  Subcutaneous Fat Loss: None observed  Muscle Loss: None observed  Fluid Accumulation/Edema: None noted  Malnutrition Diagnosis: Patient does not meet two of the established criteria necessary for diagnosing malnutrition    NUTRITION DIAGNOSIS  Unintended weight loss related to possible decrease in oral intake as evidenced by recent weight loss     INTERVENTIONS  Implementation  Nutrition Education: Provided education on low fiber diet. Reviewed foods recommended and foods not recommended. Handouts provided: Fiber-restricted nutrition therapy and low fiber diet menu for . Patient and patient's spouse verblaized understanding.      Goals  Patient to consume % of nutritionally adequate meal trays TID, or the equivalent with supplements/snacks.     Monitoring/Evaluation  Progress toward goals will be monitored and evaluated per protocol.    Yuridia Alvarez, MS/RD/LD/CNSC  Weekday Pager: [phone]  Weekday Units [facility]: 7C (all beds) and 5A (beds [id number])  Weekend/[address] pager: [phone]

## 2021-08-10 NOTE — PROGRESS NOTES
08/10/21 1537   Quick Adds   Type of Visit Initial Occupational Therapy Evaluation   Living Environment   People in home spouse   Current Living Arrangements house   Home Accessibility stairs to enter home   Number of Stairs, Main Entrance 2   Transportation Anticipated car, drives self;family or friend will provide   Self-Care   Usual Activity Tolerance excellent   Current Activity Tolerance good   Regular Exercise Yes   Activity/Exercise Type strength training   Exercise Amount/Frequency daily   Equipment Currently Used at Home none   Disability/Function   Hearing Difficulty or Deaf no   Wear Glasses or Blind no   Concentrating, Remembering or Making Decisions Difficulty no   Difficulty Communicating no   Difficulty Eating/Swallowing no   Walking or Climbing Stairs Difficulty no   Dressing/Bathing Difficulty no   Toileting issues no   Doing Errands Independently Difficulty (such as shopping) no   Fall history within last six months no   Change in Functional Status Since Onset of Current Illness/Injury yes   General Information   Referring Physician lisa Hung   Patient/Family Therapy Goal Statement (OT) return to PLOF   Additional Occupational Profile Info/Pertinent History of Current Problem  s/p ileostomy takedown.   Existing Precautions/Restrictions abdominal   General Observations and Info pt motivated.    Cognitive Status Examination   Orientation Status orientation to person, place and time   Cognitive Status Comments no concerns.    Visual Perception   Visual Impairment/Limitations WFL   Sensory   Sensory Quick Adds No deficits were identified   Range of Motion Comprehensive   General Range of Motion no range of motion deficits identified   Strength Comprehensive (MMT)   General Manual Muscle Testing (MMT) Assessment no strength deficits identified;other (see comments)   Comment, General Manual Muscle Testing (MMT) Assessment pt highly motivated by strength training and is a     Coordination   Coordination Comments no concerns.    Bed Mobility   Bed Mobility supine-sit;sit-supine   Comment (Bed Mobility) education required.    Balance   Balance Assessment standing balance: dynamic   Standing Balance: Dynamic normal balance   Activities of Daily Living   BADL Assessment lower body dressing;toileting   Lower Body Dressing Assessment   Comment (Lower Body Dressing) education required.    Toileting   Comment (Toileting) edcuation required.    Clinical Impression   Criteria for Skilled Therapeutic Interventions Met (OT) yes   OT Diagnosis decreased ADL I/safety   Assessment of Occupational Performance 3-5 Performance Deficits   Identified Performance Deficits dressing, bathing, toileting, home making, mobility, leisure.    Planned Therapy Interventions (OT) ADL retraining;IADL retraining;bed mobility training;strengthening;transfer training;home program guidelines;progressive activity/exercise;risk factor education   Clinical Decision Making Complexity (OT) low complexity   Therapy Frequency (OT) 1x eval and treat   Predicted Duration of Therapy one day.    Risk & Benefits of therapy have been explained evaluation/treatment results reviewed;care plan/treatment goals reviewed;risks/benefits reviewed;current/potential barriers reviewed;participants voiced agreement with care plan;participants included;patient   Comment-Clinical Impression pt presents to OT with post surgical precautions and general deconditioning leading to decreased ADL I.    OT Discharge Planning    OT Discharge Recommendation (DC Rec) Home with assist   OT Rationale for DC Rec pt progressing well.    OT Brief overview of current status  after education ptday pt mod I with ADL/IADL.    Total Evaluation Time (Minutes)   Total Evaluation Time (Minutes) 3   Skin WDL   Skin Inspection Focused inspection (identify areas inspected)   Skin WDL X   Skin Temperature warm   Skin Moisture dry,flaky   Skin Elasticity quick return to  original state   Skin Integrity/Characteristics other (see comments)  (Take down site, PIV x2)   Skin Color pale

## 2021-08-10 NOTE — PROGRESS NOTES
"Post Op Check    08/10/2021    Radha Jameson is a 60 year old female with complex medical history including HTN, DVTs, absent IVC, lupus anticoagulant, anemia, thyrotoxicosis, GERD, Matson's esophagus s/p fundoplication, CKD IV, PCOS, fibromyalgia, DJD, anxiety, depression, and UC, refractory to medical management, s/p total abdominal colectomy in 2019. She returned to the OR in 7/2020 for completion proctectomy and pouch formation. The pouch, however, could not be formed due to rectal varices in significant pelvic venous collaterals related to her history of congenitally absent IVC. She more recently consulted with Dr. Hung and underwent ileal pouch-anal anastomosis, and diverting loop ileostomy on 6/10/21. She is now POD#0 s/p loop ileostomy takedown.    Pt reports that she is doing well overall. Denies SOB, chest pain, heart palpitations, or dizziness. Has had some blood per rectum after her surgery. Voiding spontaneously. Has been ambulating in the hallway.  Has had some sanguinous output from penrose drain site requiring dressing change. Is concerned about post-op edema as this happened last time she had surgery.    /72 (BP Location: Right arm)   Pulse 60   Temp 98.6  F (37  C) (Temporal)   Resp 20   Ht 1.753 m (5' 9\")   Wt 71 kg (156 lb 8.4 oz)   LMP 01/01/2009   SpO2 95%   BMI 23.11 kg/m      Gen: awake, alert, NAD, laying comfortably in bed  Chest: breathing non-labored ORA  Abdomen: soft, appropriately TTP, non-distended, abdominal binder IP, dressing w/sanguinous drainage (outlined)  Extremities: WWP, non-edematous    A/P: No acute post-op issues. Continue plan of care.    -Pain control with tylenol, dilaudid, oxy  -FLD  -PPx: Lovenox  -mIVFs  -AM BMP, CBC  -Up ad mayur  -Continue abdominal binder   -Will re-assess penrose drain in AM, okay to continue dressing changes PRN        Becky Santos MD  PGY-1, General Surgery      "

## 2021-08-10 NOTE — ANESTHESIA CARE TRANSFER NOTE
Patient: Radha COSTA Stalcar    Procedure(s):  CLOSURE, ILEOSTOMY    Diagnosis: Ulcerative pancolitis with complication (H) [K51.019]  Follow-up examination after colorectal surgery [Z09]  Diagnosis Additional Information: No value filed.    Anesthesia Type:   General     Note:      Level of Consciousness: awake  Oxygen Supplementation: nasal cannula    Independent Airway: airway patency satisfactory and stable  Dentition: dentition unchanged  Vital Signs Stable: post-procedure vital signs reviewed and stable  Report to RN Given: handoff report given  Patient transferred to: PACU  Comments: Patient transferred to PACU in stable condition, breathing spontaneously on NC, VSS.  Report given to RN.  Handoff Report: Identifed the Patient, Identified the Reponsible Provider, Reviewed the pertinent medical history, Discussed the surgical course, Reviewed Intra-OP anesthesia mangement and issues during anesthesia, Set expectations for post-procedure period and Allowed opportunity for questions and acknowledgement of understanding      Vitals:  Vitals Value Taken Time   BP     Temp     Pulse     Resp     SpO2 84 % 08/10/21 0941   Vitals shown include unvalidated device data.    Electronically Signed By: LEVI Key CRNA  August 10, 2021  9:43 AM

## 2021-08-10 NOTE — PLAN OF CARE
POD#0- s/p ileostomy takedown.  VSS. Neuros intact.  LS clear, IS/CDB encouraged.  +BS, +flatus, pt passing dark red rectal drainage-PA notified.  Tolerating Full liqs.  Voiding spontaneously.  SBA to bathroom, walked from gurney to room post-op.  Pain rated at 2-3/10, tolerable-scheduled ty;lenol given.  RLQ TD site with moderate serosang drainage. MD aware. Dressing reinforced with ABD pad, changed x2 and secured with abd binder.  Left upper and lower lip with swelling and bruising(from ET tube?).  Bilateral PIV, rt infusing with LR 100cc/hr.  Magnesium replacement given, recheck in the AM.  Continue to monitor bowel fxn, VS, pain incision/drain and gen status and continue with POC.

## 2021-08-10 NOTE — ANESTHESIA PROCEDURE NOTES
TAP Procedure Note  Pre-Procedure   Staff -        Anesthesiologist:  Andre Ballard MD       Other Anesthesia Staff: Francesca Brizuela       Performed By: anesthesiologist and other anesthesia staff       Location: pre-op       Procedure Start/Stop Times: 8/10/2021 7:35 AM and 8/10/2021 7:45 AM       Pre-Anesthestic Checklist: patient identified, IV checked, site marked, risks and benefits discussed, informed consent, monitors and equipment checked, pre-op evaluation, at physician/surgeon's request and post-op pain management  Timeout:       Correct Patient: Yes        Correct Procedure: Yes        Correct Site: Yes        Correct Position: Yes        Correct Laterality: Yes        Site Marked: Yes  Procedure Documentation  Procedure: TAP       Diagnosis: POST OPERATIVE PAIN       Laterality: bilateral       Patient Position: supine       Patient Prep/Sterile Barriers: sterile gloves, mask       Skin prep: Chloraprep       Needle Type: short bevel       Needle Gauge: 21.        Needle Length (millimeters): 110        Ultrasound guided       1. Ultrasound was used to identify targeted nerve, plexus, vascular marker, or fascial plane and place a needle adjacent to it in real-time.       2. Ultrasound was used to visualize the spread of anesthetic in close proximity to the above referenced structure.       3. A permanent image is entered into the patient's record.    Assessment/Narrative         The placement was negative for: blood aspirated, painful injection and site bleeding       Paresthesias: No.     Bolus given via needle..        Secured via.        Insertion/Infusion Method: Single Shot       Complications: none       Injection made incrementally with aspirations every 5 mL.    Medication(s) Administered   Bupivacaine 0.25% PF (Infiltration), 20 mL  Bupivacaine liposome (Exparel) 1.3% LA inj susp (Infiltration), 20 mL  Medication Administration Time: 8/10/2021 7:45 AM    Comments:  Bilateral  classic TAP blocks

## 2021-08-10 NOTE — PLAN OF CARE
PT - Cancel/defer, per chart review and discussion with interdisciplinary team, pt does not require skilled inpatient physical therapy at this time. Pt progressed to independent transfer, gait and stairs within OT's session. Please re-consult as needed if patient experiences a change in functional mobility or goals requiring skilled inpatient physical therapy.    Entered by: Mary Jimenez 08/10/2021 4:00 PM

## 2021-08-10 NOTE — ANESTHESIA POSTPROCEDURE EVALUATION
Patient: Radha COSTA Stalcar    Procedure(s):  CLOSURE, ILEOSTOMY    Diagnosis:Ulcerative pancolitis with complication (H) [K51.019]  Follow-up examination after colorectal surgery [Z09]  Diagnosis Additional Information: No value filed.    Anesthesia Type:  General    Note:  Disposition: Admission   Postop Pain Control: Uneventful            Sign Out: Well controlled pain   PONV: No   Neuro/Psych: Uneventful            Sign Out: Acceptable/Baseline neuro status   Airway/Respiratory: Uneventful            Sign Out: Acceptable/Baseline resp. status   CV/Hemodynamics: Uneventful            Sign Out: Acceptable CV status; No obvious hypovolemia; No obvious fluid overload   Other NRE: NONE   DID A NON-ROUTINE EVENT OCCUR? No           Last vitals:  Vitals Value Taken Time   /93 08/10/21 1030   Temp 36.7  C (98.1  F) 08/10/21 1025   Pulse 61 08/10/21 1032   Resp 16 08/10/21 1025   SpO2 93 % 08/10/21 1033   Vitals shown include unvalidated device data.    Electronically Signed By: Tom Call MD  August 10, 2021  3:07 PM

## 2021-08-10 NOTE — PHARMACY-ADMISSION MEDICATION HISTORY
Admission Medication History Completed by Pharmacy    See [facility] Admission Navigator for allergy information, preferred outpatient pharmacy, prior to admission medications and immunization status.     Medication History Sources:     Pre-op pharmacist med hx, pre-op RN assessment.       Prior to Admission medications    Medication Sig Last Dose Taking? Auth Provider   BIOTIN PO Take 1 tablet by mouth daily Unknown strength Past Month at Unknown time Yes Unknown, Entered By History   busPIRone (BUSPAR) 5 MG tablet 10 mg 2 times daily  8/9/2021 at Unknown time Yes Reported, Patient   carvedilol (COREG) 25 MG tablet Take 25 mg by mouth 2 times daily (with meals)  8/9/2021 at Unknown time Yes Reported, Patient   cholecalciferol 25 MCG (1000 UT) TABS Take 1,000 Units by mouth daily Past Month at Unknown time Yes Unknown, Entered By History   dutasteride (AVODART) 0.5 MG capsule Take 0.5 mg by mouth 2 times daily  8/9/2021 at Unknown time Yes Reported, Patient   escitalopram (LEXAPRO) 20 MG tablet Take 20 mg by mouth every morning 8/9/2021 at Unknown time Yes Reported, Patient   finasteride (PROSCAR) 5 MG tablet Take 5 mg by mouth daily  8/9/2021 at Unknown time Yes Reported, Patient   nortriptyline (PAMELOR) 25 MG capsule Take 25 mg by mouth At Bedtime  8/9/2021 at Unknown time Yes Reported, Patient   aspirin (ASA) 325 MG EC tablet Take 325 mg by mouth daily 8/7/2021  Unknown, Entered By History   augmented betamethasone dipropionate (DIPROLENE) 0.05 % external lotion APPLY TO THE AFFECTED AREA OF SCALP TWICE DAILY UNTIL IMPROVED   Reported, Patient   EPINEPHrine (ANY BX GENERIC EQUIV) 0.3 MG/0.3ML injection 2-pack INJECT 0.3 ML INTO THE MUSCLE ONE TIME PRF ALLERGIC REACTION   Reported, Patient   ketoconazole (NIZORAL) 2 % external shampoo APPLY TO AFFECTED AREAS ON SCALP ONCE EVERY OTHER WEEK AND THEN WASH OFF   Reported, Patient       Date completed: 08/10/21    Medication history completed by: Magalie Kim,  RPH

## 2021-08-10 NOTE — ANESTHESIA PROCEDURE NOTES
Airway       Patient location during procedure: OR       Procedure Start/Stop Times: 8/10/2021 8:20 AM  Staff -        CRNA: Jordon Velasquez APRN CRNA       Performed By: CRNA  Consent for Airway        Urgency: elective  Indications and Patient Condition       Indications for airway management: marisol-procedural       Induction type:intravenous       Mask difficulty assessment: 1 - vent by mask    Final Airway Details       Final airway type: endotracheal airway       Successful airway: ETT - single  Endotracheal Airway Details        ETT size (mm): 7.0       Cuffed: yes       Successful intubation technique: direct laryngoscopy       DL Blade Type: MAC 3       Grade View of Cords: 2       Adjucts: stylet       Position: Right       Measured from: lips       Secured at (cm): 22    Post intubation assessment        Placement verified by: capnometry, equal breath sounds and chest rise        Number of attempts at approach: 1       Secured with: pink tape       Ease of procedure: easy       Dentition: Intact and Unchanged

## 2021-08-10 NOTE — OP NOTE
"PREOPERATIVE DIAGNOSIS:  1. Loop ileostomy status.    POSTOPERATIVE DIAGNOSIS:   1. Loop ileostomy status.    PROCEDURE:  1. Loop ileostomy takedown.  2. Small bowel, stapled, side-to-side anastomosis.  3. Repair of abdominal wall at ileostomy site.    ANESTHESIA: General endotracheal anesthesia plus bilateral TAP blocks local anesthesia.    SURGEON:  Uriel Hung M.D.    ASSISTANT(S): Madelaine Park M.D.    INDICATIONS FOR PROCEDURE  Radha Jameson is a 60 year old female who previously underwent IPAA with DLI. I thoroughly discussed the risks, benefits, and alternatives of operative treatment with the patient and he/she agreed to proceed.    General risks related to abdominal surgery were reviewed with the patient. These include, but are not limited to, death, myocardial infarction, pneumonia, urinary tract infection, deep venous thrombosis with or without pulmonary embolus, abdominal infection from bowel injury or abscess, fistula, anastomotic leak that may require reoperation and stoma, bowel obstruction, wound infection, and bleeding.    OPERATIVE PROCEDURE: After obtaining informed consent, the patient was brought to the operating room and placed in the supine position. Appropriate preoperative mechanical and chemical deep venous thrombosis prophylaxis, as well as preoperative prophylactic parenteral antibiotics were given. General endotracheal anesthesia was gently induced. Bilateral lower extremity pneumatic compression devices were applied and all pressure points were cushioned. A Valerio cathter was inserted. The abdomen was then preped and draped in the standard sterile fashion. After a \"time-out\" was performed, a circumferential incision was made at the mucocutaneous junction of the ileostomy. A lone-star surgical retractor was placed for adequate exposure. The subcutaneous tissue was carefully dissected off the terminal ileum down to the level of the fascia. The terminal ileum was then  " from the abdominal wall fascia and muscle using sharp dissection. The peritoneal cavity was then entered and any additional adhesions from the ileum to the parietal peritoneum were carefully taken down. After obtaining sufficient bowel length to perform an anastomosis, the ileostomy edges were inverted with sharp dissection. Both segments of terminal ileum were aligned, making sure to not incorporate mesentery or adjacent tissues, and a stapled side-to-side functional end-to-end ileo-ileal anastomosis was made with a MISSY 100 surgical stapler (blue load). The anastomosis was evaluated through the common enterotomy and no bleeding was visualized. After this, a TA 90 surgical stapler (blue load) was used to transect the remaining colon and terminal ileum, making sure the MISSY staple lines were offset. The specimen, including the loop ileostomy edges was not sent to pathology. The anastomosis appeared to be well vascularized, widely patent, and without tension or torsion. The TA staple line was reinforced with a 4-0 Monocryl running horizontal mattress suture. The corner of the MISSY staple line was reinforced with 2 separate 4-0 Silk sutures.    The bowel was then returned to the peritoneal cavity. The peritoneal cavity was irrigated with water and suctioned out. There was no evidence of bleeding or bowel injury. Instrument, sponge, and needle counts were all correct, as reported to me. The right lower quadrant abdominal wall defect was re approximated with multiple 1 Prolene figure-of-eight sutures. The wound was irrigated and dried, and hemostasis was corroborated. The dermis was loosely re-approximated in a circular fashion with a running 2-0 Vicryl pursestring suture. A 5/8 inch Penrose drain was left in the incision and loosely secured in place with a 2-0 Nylon suture. A sterile dressing was applied. The patient tolerated the procedure well.    COMPLICATIONS: none, immediately.    ESTIMATED BLOOD LOSS:  10mL.    REPLACEMENT:     - Crystalloid: 400mL.     - Colloid: 0mL.     - Blood products: none.    SPECIMEN(S): None.    OPERATIVE COUNT: Complete.    DRAINS/TUBES: 3/4 inch Penrose drain at the old ostomy wound. Valerio catheter was removed at the end of the case.    OPERATIVE FINDINGS: stapled side-to-side ileo-ileal anastomosis.    Uriel Hung M.D., M.Sc.    Division of Colon & Rectal Surgery  [facility]  [phone] (p)  [phone] (o)    CC:  [facility] billing.

## 2021-08-10 NOTE — ANESTHESIA PREPROCEDURE EVALUATION
Anesthesia Pre-Procedure Evaluation    Patient: Radha Jameson   MRN: [id number] : 1960        Preoperative Diagnosis: Ulcerative pancolitis with complication (H) [K51.019]  Follow-up examination after colorectal surgery [Z09]   Procedure : Procedure(s):  CLOSURE, ILEOSTOMY     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     Ulcerative pancolitis with complication (H)       Past Surgical History:   Procedure Laterality Date     BILATERAL OOPHORECTOMY  2009     BREAST SURGERY      reconstruction      SECTION       COLECTOMY TOTAL  2019     COLONOSCOPY       CV FEMORAL ANGIOGRAM       DAVINCI COLECTOMY N/A 6/10/2021    Procedure: ROBOT-ASSISTED  ileal pouch-anal anastomosis, diverting loop ileostomy;  Surgeon: Uriel Hung MD;  Location: [facility] OR     EGD      , 2017     GASTRIC FUNDOPLICATION  2017     HIATAL HERNIA REPAIR  2017    LINX     INSERT STENT URETER N/A 6/10/2021    Procedure: INSERTION, STENT, URETER, PREOPERATIVE;  Surgeon: Joaquin Dixon MD;  Location: [facility] OR     OVARIAN CYST REMOVAL       PELVIC LAPAROSCOPY       Removal of LINX       RLE Venogram/thrombolysis Right 05/15/2020     SIGMOIDOSCOPY FLEXIBLE N/A 6/10/2021    Procedure: SIGMOIDOSCOPY, FLEXIBLE;  Surgeon: Uriel Hung MD;  Location: [facility] OR      Allergies   Allergen Reactions     Warfarin Other (See Comments)     Internal bleeding reaction.     Wasp Venom Protein Angioedema and Other (See Comments)     Morphine Itching     Itching in throat per pt     Amlodipine  Other (See Comments)     Edema        Social History     Tobacco Use     Smoking status: Never Smoker     Smokeless tobacco: Never Used   Substance Use Topics     Alcohol use: Yes      Wt Readings from Last 1 Encounters:   07/26/21 71.5 kg (157 lb 9.6 oz)        Anesthesia Evaluation   Pt has had prior anesthetic. Type: General and MAC.    History of anesthetic complications  - PONV.  Two episodes of hypotension during surgery.    ROS/MED HX  ENT/Pulmonary:     (+) NICKI risk factors, snores loudly, hypertension,  (-) tobacco use   Neurologic:  - neg neurologic ROS  (-) no seizures, no CVA and no TIA   Cardiovascular:     (+) hypertension-----Taking blood thinners Pt has received instructions: Instructions Given to patient: Will hold ASA for 5 days prior to surgery. Previous cardiac testing   Echo: Date: Results:    Stress Test: Date: Results:    ECG Reviewed: Date: 7/26/20 Results:  SB  Cath: Date: Results:      METS/Exercise Tolerance: >4 METS    Hematologic: Comments: Lupus anticoagulant     (+) History of blood clots, pt is not anticoagulated, anemia, history of blood transfusion, no previous transfusion reaction, Known PRBC Anitbodies:No     Musculoskeletal: Comment: Firbromyalgia, neck pain, DJD      GI/Hepatic: Comment: History of Matson's  Denies GERD symptoms  S/p Fundoplication     (+) Inflammatory bowel disease,  (-) GERD   Renal/Genitourinary: Comment: PCOS    (+) renal disease, type: CRI, Pt does not require dialysis,     Endo:     (+) thyroid problem,  Thyroid disease - Other history of thyrotoxicosis due to herbal supplement,     Psychiatric/Substance Use:     (+) psychiatric history anxiety and depression     Infectious Disease:  - neg infectious disease ROS     Malignancy:  - neg malignancy ROS     Other:  - neg other ROS    (+) , H/O Chronic Pain,           OUTSIDE LABS:  CBC:   Lab Results   Component Value Date    WBC 6.2 07/26/2021    WBC 5.9 06/02/2021    HGB 12.0 07/26/2021    HGB 12.0  06/24/2021    HCT 38.3 07/26/2021    HCT 44.6 06/02/2021     07/26/2021     06/11/2021     BMP:   Lab Results   Component Value Date     07/26/2021     06/24/2021    POTASSIUM 5.0 07/26/2021    POTASSIUM 4.9 06/24/2021    CHLORIDE 112 (H) 07/26/2021    CHLORIDE 105 06/24/2021    CO2 19 (L) 07/26/2021    CO2 28 06/24/2021    BUN 38 (H) 07/26/2021    BUN 29 06/24/2021    CR 3.32 (H) 07/26/2021    CR 2.80 (H) 06/24/2021    GLC 78 07/26/2021     (H) 06/24/2021     COAGS:   Lab Results   Component Value Date    PTT 26 06/02/2021    INR 1.02 06/02/2021     POC:   Lab Results   Component Value Date    BGM 89 06/10/2021     HEPATIC:   Lab Results   Component Value Date    ALBUMIN 3.4 07/26/2021    PROTTOTAL 7.6 07/26/2021    ALT 20 07/26/2021    AST 16 07/26/2021    ALKPHOS 58 07/26/2021    BILITOTAL 0.5 07/26/2021     OTHER:   Lab Results   Component Value Date    STEPHANIE 9.6 07/26/2021    PHOS 4.3 06/10/2021    MAG 2.2 06/11/2021       Anesthesia Plan    ASA Status:  3      Anesthesia Type: General.     - Airway: ETT   Induction: Propofol, Intravenous.   Maintenance: Balanced.   Techniques and Equipment:     - Lines/Monitors: 2nd IV     Consents            Postoperative Care            Comments:                Ran Smith MD

## 2021-08-10 NOTE — PROGRESS NOTES
Admitted/transferred from: PACU  2 RN full   skin assessment completed by Emani Jean-Baptiste, RN and Natacha Garces RN.  Skin assessment finding: issues found RLQ TS ite, left upper lip swelling/redness, left lower lip- bruised   Interventions/actions: skin interventions will monitor     Will continue to monitor.

## 2021-08-11 VITALS
OXYGEN SATURATION: 100 % | RESPIRATION RATE: 18 BRPM | SYSTOLIC BLOOD PRESSURE: 143 MMHG | TEMPERATURE: 98 F | BODY MASS INDEX: 23.18 KG/M2 | HEIGHT: 69 IN | DIASTOLIC BLOOD PRESSURE: 89 MMHG | WEIGHT: 156.53 LBS | HEART RATE: 68 BPM

## 2021-08-11 LAB
ANION GAP SERPL CALCULATED.3IONS-SCNC: 7 MMOL/L (ref 3–14)
BUN SERPL-MCNC: 35 MG/DL (ref 7–30)
CALCIUM SERPL-MCNC: 9 MG/DL (ref 8.5–10.1)
CHLORIDE BLD-SCNC: 107 MMOL/L (ref 94–109)
CO2 SERPL-SCNC: 21 MMOL/L (ref 20–32)
CREAT SERPL-MCNC: 3.1 MG/DL (ref 0.52–1.04)
GFR SERPL CREATININE-BSD FRML MDRD: 16 ML/MIN/1.73M2
GLUCOSE BLD-MCNC: 100 MG/DL (ref 70–99)
MAGNESIUM SERPL-MCNC: 1.8 MG/DL (ref 1.6–2.3)
PLATELET # BLD AUTO: 294 10E3/UL (ref 150–450)
POTASSIUM BLD-SCNC: 4.8 MMOL/L (ref 3.4–5.3)
SODIUM SERPL-SCNC: 135 MMOL/L (ref 133–144)

## 2021-08-11 PROCEDURE — 80048 BASIC METABOLIC PNL TOTAL CA: CPT | Performed by: COLON & RECTAL SURGERY

## 2021-08-11 PROCEDURE — 250N000013 HC RX MED GY IP 250 OP 250 PS 637: Performed by: COLON & RECTAL SURGERY

## 2021-08-11 PROCEDURE — 83735 ASSAY OF MAGNESIUM: CPT | Performed by: COLON & RECTAL SURGERY

## 2021-08-11 PROCEDURE — 258N000003 HC RX IP 258 OP 636: Performed by: COLON & RECTAL SURGERY

## 2021-08-11 PROCEDURE — 36415 COLL VENOUS BLD VENIPUNCTURE: CPT | Performed by: COLON & RECTAL SURGERY

## 2021-08-11 PROCEDURE — 85049 AUTOMATED PLATELET COUNT: CPT | Performed by: COLON & RECTAL SURGERY

## 2021-08-11 PROCEDURE — 250N000011 HC RX IP 250 OP 636: Performed by: COLON & RECTAL SURGERY

## 2021-08-11 RX ORDER — ACETAMINOPHEN 500 MG
1000 TABLET ORAL EVERY 6 HOURS
Qty: 60 TABLET | Refills: 0 | Status: SHIPPED | OUTPATIENT
Start: 2021-08-11 | End: 2022-06-06

## 2021-08-11 RX ORDER — ACETAMINOPHEN 500 MG
1000 TABLET ORAL EVERY 6 HOURS
Status: DISCONTINUED | OUTPATIENT
Start: 2021-08-11 | End: 2021-08-11 | Stop reason: HOSPADM

## 2021-08-11 RX ORDER — OXYCODONE HYDROCHLORIDE 5 MG/1
5 TABLET ORAL EVERY 6 HOURS PRN
Qty: 18 TABLET | Refills: 0 | Status: SHIPPED | OUTPATIENT
Start: 2021-08-11 | End: 2021-11-11

## 2021-08-11 RX ADMIN — ACETAMINOPHEN 1000 MG: 500 TABLET, FILM COATED ORAL at 08:09

## 2021-08-11 RX ADMIN — ACETAMINOPHEN 1000 MG: 500 TABLET, FILM COATED ORAL at 13:20

## 2021-08-11 RX ADMIN — FINASTERIDE 5 MG: 5 TABLET, FILM COATED ORAL at 08:10

## 2021-08-11 RX ADMIN — BUSPIRONE HYDROCHLORIDE 10 MG: 5 TABLET ORAL at 08:09

## 2021-08-11 RX ADMIN — ESCITALOPRAM OXALATE 20 MG: 20 TABLET ORAL at 08:09

## 2021-08-11 RX ADMIN — CARVEDILOL 25 MG: 12.5 TABLET, FILM COATED ORAL at 08:09

## 2021-08-11 RX ADMIN — ACETAMINOPHEN 1000 MG: 500 TABLET, FILM COATED ORAL at 00:08

## 2021-08-11 RX ADMIN — DUTASTERIDE 0.5 MG: 0.5 CAPSULE, LIQUID FILLED ORAL at 09:32

## 2021-08-11 RX ADMIN — ERTAPENEM SODIUM 500 MG: 1 INJECTION, POWDER, LYOPHILIZED, FOR SOLUTION INTRAMUSCULAR; INTRAVENOUS at 07:58

## 2021-08-11 RX ADMIN — OXYCODONE HYDROCHLORIDE 5 MG: 5 TABLET ORAL at 09:32

## 2021-08-11 ASSESSMENT — ACTIVITIES OF DAILY LIVING (ADL)
ADLS_ACUITY_SCORE: 16
ADLS_ACUITY_SCORE: 14
ADLS_ACUITY_SCORE: 14

## 2021-08-11 NOTE — PLAN OF CARE
Problem: Pain Acute  Goal: Acceptable Pain Control and Functional Ability  Outcome: Improving  Intervention: Develop Pain Management Plan  Recent Flowsheet Documentation  Taken 8/11/2021 0008 by ASHANTI TRAN  Pain Management Interventions: medication (see MAR)     Problem: Activity Intolerance  Goal: Enhanced Capacity and Energy  Outcome: Improving   Pt AOX4. VS and assessment as documented. Ambulating to the BR independently, up walking in hallway during shift. Adequate  urine output during shift. +gas. Passed small amount of bloody stool. MD made aware. LR infusing at 100ml/hr.  Lower lip swollen from surgery. Needs met, call light within reach, safety maintained. Will CTM

## 2021-08-11 NOTE — DISCHARGE SUMMARY
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 Loop ileostomy takedown.  2. Small bowel, stapled, side-to-side anastomosis.  3. Repair of abdominal wall at ileostomy site.         Consultations:   NUTRITION SERVICES ADULT IP CONSULT  OCCUPATIONAL THERAPY ADULT IP CONSULT  PHYSICAL THERAPY ADULT IP CONSULT         Imaging Studies:     Results for orders placed or performed during the hospital encounter of 08/10/21   POC US Guidance Needle Placement    Narrative    Bilateral transversus abdominis plane block            Medications Prior to Admission:     Medications Prior to Admission   Medication Sig Dispense Refill Last Dose     BIOTIN PO Take 1 tablet by mouth daily Unknown strength   Past Month at Unknown time     busPIRone (BUSPAR) 5 MG tablet 10 mg 2 times daily    8/9/2021 at Unknown time     carvedilol (COREG)  25 MG tablet Take 25 mg by mouth 2 times daily (with meals)    8/9/2021 at Unknown time     cholecalciferol 25 MCG (1000 UT) TABS Take 1,000 Units by mouth daily   Past Month at Unknown time     dutasteride (AVODART) 0.5 MG capsule Take 0.5 mg by mouth 2 times daily    8/9/2021 at Unknown time     EPINEPHrine (ANY BX GENERIC EQUIV) 0.3 MG/0.3ML injection 2-pack INJECT 0.3 ML INTO THE MUSCLE ONE TIME PRF ALLERGIC REACTION        escitalopram (LEXAPRO) 20 MG tablet Take 20 mg by mouth every morning   8/9/2021 at Unknown time     finasteride (PROSCAR) 5 MG tablet Take 5 mg by mouth daily    8/9/2021 at Unknown time     nortriptyline (PAMELOR) 25 MG capsule Take 25 mg by mouth At Bedtime    8/9/2021 at Unknown time     aspirin (ASA) 325 MG EC tablet Take 325 mg by mouth daily   8/7/2021     augmented betamethasone dipropionate (DIPROLENE) 0.05 % external lotion APPLY TO THE AFFECTED AREA OF SCALP TWICE DAILY UNTIL IMPROVED        ketoconazole (NIZORAL) 2 % external shampoo APPLY TO AFFECTED AREAS ON SCALP ONCE EVERY OTHER WEEK AND THEN WASH OFF               Discharge Medications:     Current Discharge Medication List      START taking these medications    Details   acetaminophen (TYLENOL) 500 MG tablet Take 2 tablets (1,000 mg) by mouth every 6 hours As scheduled for the next 5-7 days, then decrease both dose & frequency to as needed there after.  Qty: 60 tablet, Refills: 0    Associated Diagnoses: Ileostomy status (H)      oxyCODONE (ROXICODONE) 5 MG tablet Take 1 tablet (5 mg) by mouth every 6 hours as needed for moderate to severe pain  Qty: 18 tablet, Refills: 0    Associated Diagnoses: Ileostomy status (H)         CONTINUE these medications which have CHANGED    Details   aspirin (ASA) 325 MG EC tablet Take 1 tablet (325 mg) by mouth daily         CONTINUE these medications which have NOT CHANGED    Details   BIOTIN PO Take 1 tablet by mouth daily Unknown strength      busPIRone (BUSPAR) 5 MG tablet 10 mg 2 times  daily       carvedilol (COREG) 25 MG tablet Take 25 mg by mouth 2 times daily (with meals)       cholecalciferol 25 MCG (1000 UT) TABS Take 1,000 Units by mouth daily      dutasteride (AVODART) 0.5 MG capsule Take 0.5 mg by mouth 2 times daily       EPINEPHrine (ANY BX GENERIC EQUIV) 0.3 MG/0.3ML injection 2-pack INJECT 0.3 ML INTO THE MUSCLE ONE TIME PRF ALLERGIC REACTION      escitalopram (LEXAPRO) 20 MG tablet Take 20 mg by mouth every morning      finasteride (PROSCAR) 5 MG tablet Take 5 mg by mouth daily       nortriptyline (PAMELOR) 25 MG capsule Take 25 mg by mouth At Bedtime       augmented betamethasone dipropionate (DIPROLENE) 0.05 % external lotion APPLY TO THE AFFECTED AREA OF SCALP TWICE DAILY UNTIL IMPROVED      ketoconazole (NIZORAL) 2 % external shampoo APPLY TO AFFECTED AREAS ON SCALP ONCE EVERY OTHER WEEK AND THEN WASH OFF                    Brief History of Illness:   60 year old female with PMH of congenitally absent IVC, h/o multiple DVTs, lupus anticoagulant, barretts s/p prior fundoplication, CKD stage 4 and medically refractory UC s/p prior TAC w/ EI in 10/2019 followed by robotic completion proctectomy with J-pouch formation with diverting loop ileostomy in June 2021. Now s/p loop ileostomy take down on 8/10/2021.           Hospital Course:   Post-operatively pt was gently fluid resuscitated.  Valerio was removed and pt was able to void.  Pt had eventual return of bowel function and was able to tolerate small amounts of a low fiber diet. Pt has penrose drain in old ostomy site.  Due to oozing, penrose drain was removed and packed.  Pt was taught how to pack her old ostomy site.  Pt felt comfortable doing this at home.  Pt instructed to pack BID and more as needed.  Some supplies were given to patient.     Post-operative pain was controlled with scheduled tylenoland prn oxycodone.     Patient is to follow up in the Colon and Rectal Surgery Clinic in 2-3 week with Simi Chen NP  "and then with Dr. Hung in 4-6 weeks after.          Day of Discharge Physical Exam:   Blood pressure (!) 143/89, pulse 68, temperature 98  F (36.7  C), temperature source Oral, resp. rate 18, height 1.753 m (5' 9\"), weight 71 kg (156 lb 8.4 oz), last menstrual period 01/01/2009, SpO2 100 %, not currently breastfeeding.    Gen: AAOx3, NAD  Pulm: Non-labored breathing  Abd: Soft, appropriately tender, no guarding/rebound   Old stoma site with penrose.  Drain removed and packed with dry gauze.   Ext:  Warm and well-perfused           Discharge Instructions and Follow-Up:     Discharge Procedure Orders   Reason for your hospital stay   Order Comments: 8/10/2021:  PROCEDURE:  1. Loop ileostomy takedown.  2. Small bowel, stapled, side-to-side anastomosis.  3. Repair of abdominal wall at ileostomy site.     [facility] Follow-up and recommended labs and tests   Order Comments: WOUND CARE  -Inspect your wounds daily for signs of infection (increased redness, drainage, pain)  -Keep your wound clean and dry  -You may shower, but do not soak in tub or pool  -You have a penrose drain sutured into your old ostomy site to help facilitate wound drainage.  This penrose will be removed in clinic in a few weeks.  In the mean time, place a piece of gauze under the drain and another gauze over the drain to absorb wound drainage and secure gauze with medical adhesive.      NOTIFY  Please contact Kristin Delgado RN or Delores BERMEO at [phone] for problems after discharge such as:  -Temperature > 101F, chills, rigors, dizziness  -Redness around or purulent drainage from wound  -Inability to tolerate diet, nausea or vomiting  -You stop passing gas, develop significant bloating, abdominal pain  -Have blood in stools/vomit  -Have severe diarrhea/constipation  -Any other questions or concerns.  - At nights (after 4:30pm), on weekends, or if urgent, call [phone] and ask the  to speak with the on-call Colorectal Surgery " resident or fellow      Medication Instructions  Some of your medications may have changed. Please take only prescribed and resumed medications     FOLLOW-UP  1.  You will need to follow-up with Simi Chen NP in the Colon and Rectal Surgery clinic in 2-3 week(s) and then with [facility]S Staff: Dr. Uriel Hung in 4-6 weeks after.  Please contact our clinic scheduler (phone # [phone]) if you have not heard from our clinic in 3 business days afer discharge to schedule a follow-up appointment.     2.  If the oozing from your old ostomy site stops, then you can resume your home aspirin on Monday 8/16.  If you continue to have oozing, then hold your aspirin and call the clinic to discuss.         Appointments on [place] and/or [facility] (with [facility] or [facility] provider or service). Call [phone] if you haven't heard regarding these appointments within 7 days of discharge.     Activity   Order Comments: Your activity upon discharge:   ACTIVITY  -No lifting, pushing, pulling greater than 10 lbs and no strenuous exercise for 6 weeks   -No driving while on narcotic analgesics (i.e. Percocet, oxycodone, Vicodin)  -No driving until you are able to fully twist to both sides or slam on brakes quickly and without any pain  -We encourage walking at least 4-5 times per day     Order Specific Question Answer Comments   Is discharge order? Yes      Wound care and dressings   Order Comments: Instructions to care for your wound at home:   Old ostomy site:     1.  Remove old packing.  May need to direct some saline onto the wound opening and gauze to help facilitate easier removal of gauze if dried onto your wound.  (You can also leave dressings in place, take a shower, when done with shower, dressings will be wet and will come out easier also.  Then pack in new gauze after shower.)  2.  Gently pack a corner of the gauze (single layer) into the old ostomy site with a q-tip.  Feed a little additional gauze with  the q-tip 2-3 times more, approximately about 1 inch total of corner of gauze.  Do not advance q-tip any further than the q-tip head length.    3.  Then cover with additional gauze and secure in place with medical adhesive.    4.  Can place abdominal binder over all of this as needed for comfort.      Wound should become more shallow over a few weeks and you will notice that you are unable to pack as much gauze as prior.     Diet   Order Comments: Follow this diet upon discharge:  DIET  -Low Residue Diet for at least 4-6 weeks unless cleared by Colorectal surgery.  No raw vegetables, fruit skins, fibrous foods that require a lot of chewing, nuts, seeds, corn, popcorn.   -We recommend eating slowly, chewing thoroughly, eating small frequent meals throughout the day  -Stay well hydrated.     Order Specific Question Answer Comments   Is discharge order? Yes             Home Health Care:     Not needed           Discharge Disposition:     Discharged to home      Condition at discharge: Stable      Diann Kennedy PA-C  Colon and Rectal Surgery     Pt was seen and discussed with Dr. Park on 8/11/2021.

## 2021-08-11 NOTE — PLAN OF CARE
Assumed care from [id number]. VSS on RA, A&Ox4. Pt. Reported moderate abdominal pain with movement managed with scheduled Tylenol, PRN Oxycodone x1, and abdominal binder. Pt. Ambulating through halls independently throughout shift. Small bloody drainage noted to abdominal dressing replaced by provider this shift. MIVF discontinued this shift, PIV removed. Pt. Tolerating low fiber diet. Pt. Voiding spontaneously with adequate output, not saving. Abdomen soft, rounded, some tenderness on palpation. Pt. Passing flatus and stool. Plan to discharge this afternoon.    Pt. Discharged home with spouse 1500.

## 2021-08-11 NOTE — PLAN OF CARE
Pt had 2/10 pain throughout shift. Tylenol, ice, and PRN oxycodone x2 used with relief. RLQ dressing changed during shift due to sanguineous drainage and reinforced with abdomen pad. PIV with LR at 100 ml/hr. Tolerating full liquid diet. +gas, -BM. Urine output adequate on shift. Pt went on several walks during shift, is up independent. Left side of lip swollen from tube placement during surgery. Angela Whittaker RN on 8/10/2021 at 10:47 PM

## 2021-08-11 NOTE — PROGRESS NOTES
Colorectal Surgery Progress Note  [facility]  POD#1      Subjective: Patient passing blood in stool since surgery.    Today patient states that she is doing well. Has been ambulating frequently and voiding spontaneously. Is having some abdominal pain but it is well-controlled with pain meds. Has been tolerating FLD without issues.    Vitals:  Vitals:    08/10/21 1140 08/10/21 1438 08/10/21 2300 08/11/21 0721   BP: 124/76 117/72 (!) 140/83 (!) 143/89   BP Location: Right arm Right arm Right arm Right arm   Cuff Size:       Pulse: 65 60 57 68   Resp:  20 23 18   Temp:  98.6  F (37  C) 97.5  F (36.4  C) 98  F (36.7  C)   TempSrc:  Temporal Oral Oral   SpO2: 98% 95% 98% 100%   Weight:       Height:         I/O:  I/O last 3 completed shifts:  In: 3310.01 [P.O.:1210; I.V.:2100.01]  Out: 1485 [Urine:1395; Stool:80; Blood:10]    Physical Exam:  Gen: Alert, awake, NAD, laying comfortably in bed  Pulm: Non-labored breathing ORA  Abd: Soft, minimally distended, appropriately tender, no guarding   Stoma site w/penrose in place, draining sanguinous fluid   Ext:  Moving all 4 appropriately    BMP  Recent Labs   Lab 08/10/21  1007 08/10/21  0632   POTASSIUM 5.4*  --    CR 3.26*  --    GLC  --  71   MAG 1.4*  --    PHOS 4.1  --      CBCNo lab results found in last 7 days.      ASSESSMENT: Radha Jameson is a 60 year old female with complex medical history including HTN, DVTs, absent IVC, lupus anticoagulant, anemia, thyrotoxicosis, GERD, Matson's esophagus s/p fundoplication, CKD IV, PCOS, fibromyalgia, DJD, anxiety, depression, and UC, refractory to medical management, s/p total abdominal colectomy in 2019. She returned to the OR in 7/2020 for completion proctectomy and pouch formation. The pouch, however, could not be formed due to rectal varices in significant pelvic venous collaterals related to her history of congenitally absent IVC. She more recently consulted with Dr. Hung and underwent  ileal pouch-anal anastomosis, and diverting loop ileostomy on 6/10/21. She is now s/p loop ileostomy takedown on 8/10/21.    -Pain control with tylenol, dilaudid, oxy  -LRD  -PPx: Lovenox  -Discontinue IVFs  -Follow-up labs  -Frequent ambulation, IS  -Continue to monitor I/Os  -Continue abdominal binder   -Expect penrose drain to have significant output initially: continue to change dressings PRN when saturated. Dressing changed today during rounds.    Patient was seen and discussed with Fellow, Dr. Park, who will discuss with staff.    Becky Santos MD  PGY-1, General Surgery

## 2021-08-11 NOTE — PROVIDER NOTIFICATION
Pt noted new edema bilaterally in knees. MD notified. Angela Whittaker RN on 8/10/2021 at 10:36 PM

## 2021-08-13 ENCOUNTER — PATIENT OUTREACH (OUTPATIENT)
Dept: SURGERY | Facility: CLINIC | Age: 61
End: 2021-08-13

## 2021-08-13 NOTE — PROGRESS NOTES
Post Op Note     Called to check on patient postoperatively after hospital discharge.     Patient is s/p ileostomy takedown with Dr. Uriel Hung for ileostomy status.   Admitted 8/10/2021 and discharged on 8/11/2021.      Pain is well controlled with tylenol.     Patient is eating and drinking normally. Patient is on a low fiber diet.  Encouraged patient to drink 8-10 glasses of water a day.     Patient is passing flats, is having loose bowel movements. She is having loose stools still, about 6 a day. She does report pain with BMs, burning pain. She does have hx of hemorrhoids. No blood in stools. Updated . Discussed with - nesha to start low dose bowel slowing meds such as fiber or imodium. Can also do baths and Calmoseptine for burning pain.     Patient is voiding normally and urine is light in color.    Patient is not set up with home care.    Patient Denies nausea and vomiting.    Patient Denies any fevers or chills.    Patient's incision is open. She is packing this BID.     Patient is on a activity restriction. Lifting 10 pounds for 6 weeks.     Patient Denies needing any forms completed.     Follow up is set up with Simi Finch NP on 8/30 and with Dr. Uriel Hung on 10/4.   Encouraged the patient to contact the clinic in the meantime with any fevers, chills, nausea, vomiting, increased colostomy output, no colostomy output, dizziness, lightheadedness, uncontrolled pain, changes to the incisions, or with any questions or concerns.    Patient's questions were answered to their stated satisfaction and they are in agreement with this plan.    MILY Foster [phone]  Colon & Rectal Surgery Clinic  [facility]

## 2021-08-17 ENCOUNTER — PATIENT OUTREACH (OUTPATIENT)
Dept: SURGERY | Facility: CLINIC | Age: 61
End: 2021-08-17

## 2021-08-17 NOTE — PROGRESS NOTES
Patient called in stating that she is still having jarvis blood from her ostomy takedown site so she hasn't started her aspirin. She is worried because this area is more painful. It is a sharp pain. I moved her post op appt to Thursday.  She is still having liquidy/soft stools. She did not start her imodium/fiber as discussed so I told her to start fiber 1 tbsp daily and imodium 1 tab BID.

## 2021-08-18 ENCOUNTER — OFFICE VISIT (OUTPATIENT)
Dept: SURGERY | Facility: CLINIC | Age: 61
End: 2021-08-18
Payer: COMMERCIAL

## 2021-08-18 ENCOUNTER — LAB (OUTPATIENT)
Dept: LAB | Facility: CLINIC | Age: 61
End: 2021-08-18

## 2021-08-18 VITALS
HEIGHT: 69 IN | WEIGHT: 163.5 LBS | BODY MASS INDEX: 24.22 KG/M2 | DIASTOLIC BLOOD PRESSURE: 77 MMHG | OXYGEN SATURATION: 100 % | HEART RATE: 67 BPM | TEMPERATURE: 97.7 F | SYSTOLIC BLOOD PRESSURE: 116 MMHG

## 2021-08-18 DIAGNOSIS — R41.0 CONFUSION: ICD-10-CM

## 2021-08-18 DIAGNOSIS — R60.9 EDEMA, UNSPECIFIED TYPE: ICD-10-CM

## 2021-08-18 DIAGNOSIS — E86.0 DEHYDRATION: ICD-10-CM

## 2021-08-18 DIAGNOSIS — Z20.822 ENCOUNTER FOR LABORATORY TESTING FOR COVID-19 VIRUS: ICD-10-CM

## 2021-08-18 DIAGNOSIS — Z09 FOLLOW-UP EXAMINATION AFTER COLORECTAL SURGERY: Primary | ICD-10-CM

## 2021-08-18 PROBLEM — N18.30 CHRONIC KIDNEY DISEASE, STAGE 3 (H): Status: ACTIVE | Noted: 2021-08-18

## 2021-08-18 LAB
ALBUMIN UR-MCNC: 30 MG/DL
ANION GAP SERPL CALCULATED.3IONS-SCNC: 3 MMOL/L (ref 3–14)
APPEARANCE UR: CLEAR
BASOPHILS # BLD AUTO: 0 10E3/UL (ref 0–0.2)
BASOPHILS NFR BLD AUTO: 0 %
BILIRUB UR QL STRIP: NEGATIVE
BUN SERPL-MCNC: 31 MG/DL (ref 7–30)
CALCIUM SERPL-MCNC: 8.8 MG/DL (ref 8.5–10.1)
CHLORIDE BLD-SCNC: 108 MMOL/L (ref 94–109)
CO2 SERPL-SCNC: 31 MMOL/L (ref 20–32)
COLOR UR AUTO: YELLOW
CREAT SERPL-MCNC: 2.62 MG/DL (ref 0.52–1.04)
EOSINOPHIL # BLD AUTO: 0.4 10E3/UL (ref 0–0.7)
EOSINOPHIL NFR BLD AUTO: 6 %
ERYTHROCYTE [DISTWIDTH] IN BLOOD BY AUTOMATED COUNT: 17.6 % (ref 10–15)
GFR SERPL CREATININE-BSD FRML MDRD: 19 ML/MIN/1.73M2
GLUCOSE BLD-MCNC: 78 MG/DL (ref 70–99)
GLUCOSE UR STRIP-MCNC: NEGATIVE MG/DL
HCT VFR BLD AUTO: 32.8 % (ref 35–47)
HGB BLD-MCNC: 10.6 G/DL (ref 11.7–15.7)
HGB UR QL STRIP: NEGATIVE
HYALINE CASTS: 3 /LPF
IMM GRANULOCYTES # BLD: 0 10E3/UL
IMM GRANULOCYTES NFR BLD: 0 %
KETONES UR STRIP-MCNC: NEGATIVE MG/DL
LEUKOCYTE ESTERASE UR QL STRIP: NEGATIVE
LYMPHOCYTES # BLD AUTO: 1.1 10E3/UL (ref 0.8–5.3)
LYMPHOCYTES NFR BLD AUTO: 15 %
MCH RBC QN AUTO: 30.3 PG (ref 26.5–33)
MCHC RBC AUTO-ENTMCNC: 32.3 G/DL (ref 31.5–36.5)
MCV RBC AUTO: 94 FL (ref 78–100)
MONOCYTES # BLD AUTO: 0.9 10E3/UL (ref 0–1.3)
MONOCYTES NFR BLD AUTO: 12 %
MUCOUS THREADS #/AREA URNS LPF: PRESENT /LPF
NEUTROPHILS # BLD AUTO: 4.8 10E3/UL (ref 1.6–8.3)
NEUTROPHILS NFR BLD AUTO: 67 %
NITRATE UR QL: NEGATIVE
NRBC # BLD AUTO: 0 10E3/UL
NRBC BLD AUTO-RTO: 0 /100
PH UR STRIP: 5 [PH] (ref 5–7)
PLATELET # BLD AUTO: 271 10E3/UL (ref 150–450)
POTASSIUM BLD-SCNC: 4.5 MMOL/L (ref 3.4–5.3)
RBC # BLD AUTO: 3.5 10E6/UL (ref 3.8–5.2)
RBC URINE: 1 /HPF
SODIUM SERPL-SCNC: 142 MMOL/L (ref 133–144)
SP GR UR STRIP: 1.02 (ref 1–1.03)
UROBILINOGEN UR STRIP-MCNC: NORMAL MG/DL
WBC # BLD AUTO: 7.2 10E3/UL (ref 4–11)
WBC URINE: 1 /HPF

## 2021-08-18 PROCEDURE — 80048 BASIC METABOLIC PNL TOTAL CA: CPT | Performed by: PATHOLOGY

## 2021-08-18 PROCEDURE — 81001 URINALYSIS AUTO W/SCOPE: CPT | Performed by: PATHOLOGY

## 2021-08-18 PROCEDURE — 99024 POSTOP FOLLOW-UP VISIT: CPT | Performed by: NURSE PRACTITIONER

## 2021-08-18 PROCEDURE — 85025 COMPLETE CBC W/AUTO DIFF WBC: CPT | Performed by: PATHOLOGY

## 2021-08-18 PROCEDURE — 87086 URINE CULTURE/COLONY COUNT: CPT | Mod: 90 | Performed by: PATHOLOGY

## 2021-08-18 PROCEDURE — 99000 SPECIMEN HANDLING OFFICE-LAB: CPT | Performed by: PATHOLOGY

## 2021-08-18 PROCEDURE — 36415 COLL VENOUS BLD VENIPUNCTURE: CPT | Performed by: PATHOLOGY

## 2021-08-18 ASSESSMENT — MIFFLIN-ST. JEOR: SCORE: 1376.01

## 2021-08-18 ASSESSMENT — PAIN SCALES - GENERAL: PAINLEVEL: NO PAIN (0)

## 2021-08-18 NOTE — PROGRESS NOTES
"Colon and Rectal Surgery Postoperative Clinic Note    RE: Radha Jameson  : 1960  JU: 2021    Radha Jameson is a very pleasant 60 year old female with PMH of congenitally absent IVC, h/o multiple DVTs, lupus anticoagulant, barretts s/p prior fundoplication, CKD stage 4 and medically refractory UC s/p prior TAC w/ EI in 10/2019 followed by robotic completion proctectomy with J-pouch formation with diverting loop ileostomy in 2021. Now s/p loop ileostomy takedown on 8/10/2021 with Dr. Hung.    Interval history: Radha reports that she is not feeling like herself. She is having a hard time concentrating and feels forgetful. She is not on any narcotics. She had some BLE leg swelling last night but this resolved. She is also having some intermittent headaches that come and go all day. She is drinking a lot of water and voiding without difficulty. No fevers or chills. Some nausea but no vomiting and she is tolerating a low residue diet. She is having 4-5 \"mushy\" stools a day that are slightly looser at night. She is having a lot of flatus and feels distended after eating but this resolves. She is having anal pain the burns. She is applying a zinc barrier cream. She has had oozing from her ostomy site and is needing to change the dressing three times a day due to bleeding. She has been holding her aspirin.    Physical Examination: Exam was chaperoned by Kristin Delgado RN   /77 (BP Location: Left arm, Patient Position: Sitting, Cuff Size: Adult Regular)   Pulse 67   Temp 97.7  F (36.5  C) (Oral)   Ht 5' 9\"   Wt 163 lb 8 oz   LMP 2009   SpO2 100%   BMI 24.14 kg/m    General: alert, oriented, in no acute distress  HEENT: mucous membranes moist  Abdomen: soft, non distended, non tender on palpation. Takedown site with some oozing blood. Silver nitrate applied with hemostasis obtained. Will have her pack with 1/2 inch NuGauze to make packing easier for her. Some ecchymosis on " abdomen.  Extremities: warm, dry, no edema  Perianal external examination:  Some mild excoriation of perianal skin    Assessment/Plan:  60 year old female s/p loop ileostomy takedown on 8/10/2021 with Dr. Hung. She is having some confusion. CBC, BMP, and UA are normal. Cr is actually improved from her baseline. Will check COVID test given her headaches. She is not on narcotics. Asked her to see her PCP for intermittent leg swelling. Her bowel function actually sounds quite normal for a J pouch with only 4-5 mushy stools a day. Takedown site with some mild oozing. Silver nitrate applied. Pack twice a day. Continue to hold ASA until follow up in one week for recheck. Patient's questions were answered to her stated satisfaction and she is in agreement with this plan.    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     Ulcerative pancolitis with complication (H)        Surgical history:  Past Surgical History:   Procedure Laterality Date     BILATERAL OOPHORECTOMY  2009     BREAST SURGERY  2003    reconstruction      SECTION       COLECTOMY TOTAL  2019     COLONOSCOPY       CV FEMORAL ANGIOGRAM       DAVINCI COLECTOMY N/A 6/10/2021    Procedure: ROBOT-ASSISTED  ileal pouch-anal anastomosis, diverting loop ileostomy;  Surgeon: Uriel Hung MD;  Location: [facility]     EGD      , 2017     GASTRIC FUNDOPLICATION  2017     HIATAL HERNIA REPAIR  2017    LINX     INSERT STENT URETER N/A 6/10/2021    Procedure: INSERTION, STENT, URETER, PREOPERATIVE;  Surgeon:  Joaquin Dixon MD;  Location: [facility] OR     OVARIAN CYST REMOVAL  1988     PELVIC LAPAROSCOPY  1985     Removal of LINX       RLE Venogram/thrombolysis Right 05/15/2020     SIGMOIDOSCOPY FLEXIBLE N/A 6/10/2021    Procedure: SIGMOIDOSCOPY, FLEXIBLE;  Surgeon: Uriel Hung MD;  Location: [facility] OR     TAKEDOWN ILEOSTOMY N/A 8/10/2021    Procedure: CLOSURE, ILEOSTOMY;  Surgeon: Uriel Hung MD;  Location: [facility] OR       Problem list:    Patient Active Problem List    Diagnosis Date Noted     Chronic kidney disease, stage 3 08/18/2021     Priority: Medium     Follow-up examination after colorectal surgery 07/02/2021     Priority: Medium     Added automatically from request for surgery [id number]       Ulcerative pancolitis with complication (H) 04/28/2021     Priority: Medium     Added automatically from request for surgery [id number]         Medications:  Current Outpatient Medications   Medication Sig Dispense Refill     acetaminophen (TYLENOL) 500 MG tablet Take 2 tablets (1,000 mg) by mouth every 6 hours As scheduled for the next 5-7 days, then decrease both dose & frequency to as needed there after. 60 tablet 0     aspirin (ASA) 325 MG EC tablet Take 1 tablet (325 mg) by mouth daily       augmented betamethasone dipropionate (DIPROLENE) 0.05 % external lotion APPLY TO THE AFFECTED AREA OF SCALP TWICE DAILY UNTIL IMPROVED       BIOTIN PO Take 1 tablet by mouth daily Unknown strength       busPIRone (BUSPAR) 5 MG tablet 10 mg 2 times daily        carvedilol (COREG) 25 MG tablet Take 25 mg by mouth 2 times daily (with meals)        cholecalciferol 25 MCG (1000 UT) TABS Take 1,000 Units by mouth daily       dutasteride (AVODART) 0.5 MG capsule Take 0.5 mg by mouth 2 times daily        EPINEPHrine (ANY BX GENERIC EQUIV) 0.3 MG/0.3ML injection 2-pack INJECT 0.3 ML INTO THE MUSCLE ONE TIME PRF ALLERGIC REACTION       escitalopram (LEXAPRO) 20 MG tablet Take 20 mg by mouth every morning       finasteride  "(PROSCAR) 5 MG tablet Take 5 mg by mouth daily        ketoconazole (NIZORAL) 2 % external shampoo APPLY TO AFFECTED AREAS ON SCALP ONCE EVERY OTHER WEEK AND THEN WASH OFF       nortriptyline (PAMELOR) 25 MG capsule Take 25 mg by mouth At Bedtime        oxyCODONE (ROXICODONE) 5 MG tablet Take 1 tablet (5 mg) by mouth every 6 hours as needed for moderate to severe pain 18 tablet 0       Allergies:  Allergies   Allergen Reactions     Warfarin Other (See Comments)     Internal bleeding reaction.     Wasp Venom Protein Angioedema and Other (See Comments)     Morphine Itching     Itching in throat per pt     Amlodipine Other (See Comments)     Edema         Family history:  Family History   Problem Relation Age of Onset     Matson's esophagus Father      Diabetes Mother      Hypertension Mother      Multiple myeloma Mother      Ulcerative Colitis Mother      Anesthesia Reaction Mother         post op delirium     Cervical Cancer Sister      Diabetes Type 2  Brother      Asthma Maternal Grandmother      Breast Cancer Maternal Grandmother      Breast Cancer Paternal Grandmother      Clotting Disorder No family hx of      Bleeding Disorder No family hx of        Social history:  Social History     Tobacco Use     Smoking status: Never Smoker     Smokeless tobacco: Never Used   Substance Use Topics     Alcohol use: Yes     Marital status: .    Nursing Notes:   Delores Carrillo CMA  8/18/2021 10:23 AM  Signed  Chief Complaint   Patient presents with     Surgical Followup     Post-op follow up       Vitals:    08/18/21 1013   BP: 116/77   BP Location: Left arm   Patient Position: Sitting   Cuff Size: Adult Regular   Pulse: 67   Temp: 97.7  F (36.5  C)   TempSrc: Oral   SpO2: 100%   Weight: 163 lb 8 oz   Height: 5' 9\"       Body mass index is 24.14 kg/m .          Delores Linares CMA         30 minutes spent on the date of the encounter doing chart review, history and exam, documentation and further " activities as noted above.   This is a postop visit.    LEVI Rowe, NP-C  Colon and Rectal Surgery  [facility]    This note was created using speech recognition software and may contain unintended word substitutions.

## 2021-08-18 NOTE — LETTER
"2021       RE: Radha Jameson  [address]  [address]  [place] [address]     Dear Colleague,    Thank you for referring your patient, Radha Jameson, to the [facility] COLON AND RECTAL SURGERY CLINIC [place] at [facility]. Please see a copy of my visit note below.    Colon and Rectal Surgery Postoperative Clinic Note    RE: Radha Jameson  : 1960  JU: 2021    Radha Jameson is a very pleasant 60 year old female with PMH of congenitally absent IVC, h/o multiple DVTs, lupus anticoagulant, barretts s/p prior fundoplication, CKD stage 4 and medically refractory UC s/p prior TAC w/ EI in 10/2019 followed by robotic completion proctectomy with J-pouch formation with diverting loop ileostomy in 2021. Now s/p loop ileostomy takedown on 8/10/2021 with Dr. Hung.    Interval history: Radha reports that she is not feeling like herself. She is having a hard time concentrating and feels forgetful. She is not on any narcotics. She had some BLE leg swelling last night but this resolved. She is also having some intermittent headaches that come and go all day. She is drinking a lot of water and voiding without difficulty. No fevers or chills. Some nausea but no vomiting and she is tolerating a low residue diet. She is having 4-5 \"mushy\" stools a day that are slightly looser at night. She is having a lot of flatus and feels distended after eating but this resolves. She is having anal pain the burns. She is applying a zinc barrier cream. She has had oozing from her ostomy site and is needing to change the dressing three times a day due to bleeding. She has been holding her aspirin.    Physical Examination: Exam was chaperoned by Kristin Delgado RN   /77 (BP Location: Left arm, Patient Position: Sitting, Cuff Size: Adult Regular)   Pulse 67   Temp 97.7  F (36.5  C) (Oral)   Ht 5' 9\"   Wt 163 lb 8 oz   LMP 2009   " SpO2 100%   BMI 24.14 kg/m    General: alert, oriented, in no acute distress  HEENT: mucous membranes moist  Abdomen: soft, non distended, non tender on palpation. Takedown site with some oozing blood. Silver nitrate applied with hemostasis obtained. Will have her pack with 1/2 inch NuGauze to make packing easier for her. Some ecchymosis on abdomen.  Extremities: warm, dry, no edema  Perianal external examination:  Some mild excoriation of perianal skin    Assessment/Plan:  60 year old female s/p loop ileostomy takedown on 8/10/2021 with Dr. Hung. She is having some confusion. CBC, BMP, and UA are normal. Cr is actually improved from her baseline. Will check COVID test given her headaches. She is not on narcotics. Asked her to see her PCP for intermittent leg swelling. Her bowel function actually sounds quite normal for a J pouch with only 4-5 mushy stools a day. Takedown site with some mild oozing. Silver nitrate applied. Pack twice a day. Continue to hold ASA until follow up in one week for recheck. Patient's questions were answered to her stated satisfaction and she is in agreement with this plan.    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     Ulcerative pancolitis with complication (H)        Surgical history:  Past Surgical History:   Procedure Laterality Date     BILATERAL OOPHORECTOMY  2009     BREAST SURGERY  2003    reconstruction      SECTION       COLECTOMY TOTAL  2019     COLONOSCOPY       CV FEMORAL ANGIOGRAM       DAVINCI COLECTOMY N/A  6/10/2021    Procedure: ROBOT-ASSISTED  ileal pouch-anal anastomosis, diverting loop ileostomy;  Surgeon: Uriel Hung MD;  Location: [facility] OR     EGD      2016, 2017     GASTRIC FUNDOPLICATION  2017     HIATAL HERNIA REPAIR  2017    LINX     INSERT STENT URETER N/A 6/10/2021    Procedure: INSERTION, STENT, URETER, PREOPERATIVE;  Surgeon: Joaquin Dixon MD;  Location: [facility] OR     OVARIAN CYST REMOVAL  1988     PELVIC LAPAROSCOPY  1985     Removal of LINX       RLE Venogram/thrombolysis Right 05/15/2020     SIGMOIDOSCOPY FLEXIBLE N/A 6/10/2021    Procedure: SIGMOIDOSCOPY, FLEXIBLE;  Surgeon: Uriel Hung MD;  [facility]     TAKEDOWN ILEOSTOMY N/A 8/10/2021    Procedure: CLOSURE, ILEOSTOMY;  Surgeon: Uriel Hung MD;  Location[facility]       Problem list:    Patient Active Problem List    Diagnosis Date Noted     Chronic kidney disease, stage 3 08/18/2021     Priority: Medium     Follow-up examination after colorectal surgery 07/02/2021     Priority: Medium     Added automatically from request for surgery [id number]       Ulcerative pancolitis with complication (H) 04/28/2021     Priority: Medium     Added automatically from request for surgery [id number]         Medications:  Current Outpatient Medications   Medication Sig Dispense Refill     acetaminophen (TYLENOL) 500 MG tablet Take 2 tablets (1,000 mg) by mouth every 6 hours As scheduled for the next 5-7 days, then decrease both dose & frequency to as needed there after. 60 tablet 0     aspirin (ASA) 325 MG EC tablet Take 1 tablet (325 mg) by mouth daily       augmented betamethasone dipropionate (DIPROLENE) 0.05 % external lotion APPLY TO THE AFFECTED AREA OF SCALP TWICE DAILY UNTIL IMPROVED       BIOTIN PO Take 1 tablet by mouth daily Unknown strength       busPIRone (BUSPAR) 5 MG tablet 10 mg 2 times daily        carvedilol (COREG) 25 MG tablet Take 25 mg by mouth 2 times daily (with meals)        cholecalciferol 25 MCG (1000  UT) TABS Take 1,000 Units by mouth daily       dutasteride (AVODART) 0.5 MG capsule Take 0.5 mg by mouth 2 times daily        EPINEPHrine (ANY BX GENERIC EQUIV) 0.3 MG/0.3ML injection 2-pack INJECT 0.3 ML INTO THE MUSCLE ONE TIME PRF ALLERGIC REACTION       escitalopram (LEXAPRO) 20 MG tablet Take 20 mg by mouth every morning       finasteride (PROSCAR) 5 MG tablet Take 5 mg by mouth daily        ketoconazole (NIZORAL) 2 % external shampoo APPLY TO AFFECTED AREAS ON SCALP ONCE EVERY OTHER WEEK AND THEN WASH OFF       nortriptyline (PAMELOR) 25 MG capsule Take 25 mg by mouth At Bedtime        oxyCODONE (ROXICODONE) 5 MG tablet Take 1 tablet (5 mg) by mouth every 6 hours as needed for moderate to severe pain 18 tablet 0       Allergies:  Allergies   Allergen Reactions     Warfarin Other (See Comments)     Internal bleeding reaction.     Wasp Venom Protein Angioedema and Other (See Comments)     Morphine Itching     Itching in throat per pt     Amlodipine Other (See Comments)     Edema         Family history:  Family History   Problem Relation Age of Onset     Matson's esophagus Father      Diabetes Mother      Hypertension Mother      Multiple myeloma Mother      Ulcerative Colitis Mother      Anesthesia Reaction Mother         post op delirium     Cervical Cancer Sister      Diabetes Type 2  Brother      Asthma Maternal Grandmother      Breast Cancer Maternal Grandmother      Breast Cancer Paternal Grandmother      Clotting Disorder No family hx of      Bleeding Disorder No family hx of        Social history:  Social History     Tobacco Use     Smoking status: Never Smoker     Smokeless tobacco: Never Used   Substance Use Topics     Alcohol use: Yes     Marital status: .    Nursing Notes:   Delores Carrillo CMA  8/18/2021 10:23 AM  Signed  Chief Complaint   Patient presents with     Surgical Followup     Post-op follow up       Vitals:    08/18/21 1013   BP: 116/77   BP Location: Left arm   Patient  "Position: Sitting   Cuff Size: Adult Regular   Pulse: 67   Temp: 97.7  F (36.5  C)   TempSrc: Oral   SpO2: 100%   Weight: 163 lb 8 oz   Height: 5' 9\"       Body mass index is 24.14 kg/m .          Delores Linares CMA         30 minutes spent on the date of the encounter doing chart review, history and exam, documentation and further activities as noted above.   This is a postop visit.    LEVI Rowe, NP-C  Colon and Rectal Surgery  [facility]    This note was created using speech recognition software and may contain unintended word substitutions.        Again, thank you for allowing me to participate in the care of your patient.      Sincerely,    LEVI Rowe CNP      "

## 2021-08-18 NOTE — NURSING NOTE
"Chief Complaint   Patient presents with     Surgical Followup     Post-op follow up       Vitals:    08/18/21 1013   BP: 116/77   BP Location: Left arm   Patient Position: Sitting   Cuff Size: Adult Regular   Pulse: 67   Temp: 97.7  F (36.5  C)   TempSrc: Oral   SpO2: 100%   Weight: 163 lb 8 oz   Height: 5' 9\"       Body mass index is 24.14 kg/m .          Delores Linares CMA    "

## 2021-08-19 LAB — BACTERIA UR CULT: NORMAL

## 2021-08-25 ENCOUNTER — DOCUMENTATION ONLY (OUTPATIENT)
Dept: SURGERY | Facility: CLINIC | Age: 61
End: 2021-08-25

## 2021-08-25 ENCOUNTER — OFFICE VISIT (OUTPATIENT)
Dept: SURGERY | Facility: CLINIC | Age: 61
End: 2021-08-25
Payer: COMMERCIAL

## 2021-08-25 ENCOUNTER — LAB (OUTPATIENT)
Dept: LAB | Facility: CLINIC | Age: 61
End: 2021-08-25
Payer: COMMERCIAL

## 2021-08-25 DIAGNOSIS — R68.89 FORGETFULNESS: ICD-10-CM

## 2021-08-25 DIAGNOSIS — Z09 FOLLOW-UP EXAMINATION AFTER COLORECTAL SURGERY: ICD-10-CM

## 2021-08-25 DIAGNOSIS — R53.83 OTHER FATIGUE: Primary | ICD-10-CM

## 2021-08-25 DIAGNOSIS — Z20.822 ENCOUNTER FOR LABORATORY TESTING FOR COVID-19 VIRUS: ICD-10-CM

## 2021-08-25 DIAGNOSIS — R53.83 OTHER FATIGUE: ICD-10-CM

## 2021-08-25 PROCEDURE — 36415 COLL VENOUS BLD VENIPUNCTURE: CPT | Performed by: PATHOLOGY

## 2021-08-25 PROCEDURE — U0003 INFECTIOUS AGENT DETECTION BY NUCLEIC ACID (DNA OR RNA); SEVERE ACUTE RESPIRATORY SYNDROME CORONAVIRUS 2 (SARS-COV-2) (CORONAVIRUS DISEASE [COVID-19]), AMPLIFIED PROBE TECHNIQUE, MAKING USE OF HIGH THROUGHPUT TECHNOLOGIES AS DESCRIBED BY CMS-2020-01-R: HCPCS | Mod: 90 | Performed by: PATHOLOGY

## 2021-08-25 PROCEDURE — U0005 INFEC AGEN DETEC AMPLI PROBE: HCPCS | Mod: 90 | Performed by: PATHOLOGY

## 2021-08-25 PROCEDURE — 84425 ASSAY OF VITAMIN B-1: CPT | Mod: 90 | Performed by: PATHOLOGY

## 2021-08-25 PROCEDURE — 99024 POSTOP FOLLOW-UP VISIT: CPT | Performed by: NURSE PRACTITIONER

## 2021-08-25 NOTE — PROGRESS NOTES
Colon and Rectal Surgery Postoperative Clinic Note    RE: Radha Jameson  : 1960  JU: 2021    Radha Jameson is a very pleasant 60 year old female with PMH of congenitally absent IVC, h/o multiple DVTs, lupus anticoagulant, barretts s/p prior fundoplication, CKD stage 4 and medically refractory UC s/p prior TAC w/ EI in 10/2019 followed by robotic completion proctectomy with J-pouch formation with diverting loop ileostomy in 2021. Now s/p loop ileostomy takedown on 8/10/2021 with Dr. Hung. I saw her in clinic last week and she was having difficulty concentrating and felt forgetful. UA and BMP were WNL.    Interval history: Radha is still not feeling like herself. She is still having memory issues. She is not sleeping at night since she feels like she needs to pass stool all night. She gets up about 12 times a night and often only passes flatus or a small amount of stool. She is only having about 3-4 small bowel movements during the day. She is taking imodium a few times a day. No fevers or chills. She gets intense abdominal cramping that only lasts a few seconds and then resolves but is very painful. She also has some increasingly worse back pain that is new since surgery. She has not been able to take any naps during the day since she has been working and has not had time to try to regulate her bowel movements due to working.    Physical Examination: Exam was chaperoned by Kristin Delgado RN   LMP 2009   General: alert, oriented, in no acute distress  HEENT: mucous membranes moist  Abdomen: soft, non distended, non tender on palpation. Takedown site without bleeding. Some resoling ecchymosis on abdomen. Umbilical hernia, easily reducible with visible peristalsis.  Extremities: warm, dry, no edema    Assessment/Plan:  60 year old female s/p loop ileostomy takedown on 8/10/2021 with Dr. Hung. Will check COVID test given her headaches, which I suggested last week but she was unable  to do this due to her work schedule. She is not on narcotics. Asked her to see her PCP for intermittent leg swelling. Her bowel function sounds fairly normal for a J pouch. Recommended trying a fiber supplement and taking an extra imodium before bed. I am unsure about her back pain. Recommended wearing her binder again and follow up with her PCP. Can restart ASA and no longer need to pack takedown site-external gauze only. Will check thiamine levels per Dr. Hung's recommendation for confusion. I think this is likely related to fatigue and not sleeping at night. I recommended that she take some additional time off work so she get get proper rest and work on regulating her bowel habits better. Encouraged her to contact the clinic in the meantime with any questions or concerns.  Patient's questions were answered to her stated satisfaction and she is in agreement with this plan.    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     Ulcerative pancolitis with complication (H)        Surgical history:  Past Surgical History:   Procedure Laterality Date     BILATERAL OOPHORECTOMY  2009     BREAST SURGERY  2003    reconstruction      SECTION       COLECTOMY TOTAL  2019     COLONOSCOPY       CV FEMORAL ANGIOGRAM       DAVINCI COLECTOMY N/A 6/10/2021    Procedure: ROBOT-ASSISTED  ileal pouch-anal anastomosis, diverting loop ileostomy;  Surgeon: Uriel Hung MD;  [facility]: [facility]     EGD      , 2017     GASTRIC FUNDOPLICATION  2017     HIATAL HERNIA REPAIR   2017    LINX     INSERT STENT URETER N/A 6/10/2021    Procedure: INSERTION, STENT, URETER, PREOPERATIVE;  Surgeon: Joaquin Dixon MD;  Location: [facility] OR     OVARIAN CYST REMOVAL  1988     PELVIC LAPAROSCOPY  1985     Removal of LINX       RLE Venogram/thrombolysis Right 05/15/2020     SIGMOIDOSCOPY FLEXIBLE N/A 6/10/2021    Procedure: SIGMOIDOSCOPY, FLEXIBLE;  Surgeon: Uriel Hung MD;  Location: [facility] OR     TAKEDOWN ILEOSTOMY N/A 8/10/2021    Procedure: CLOSURE, ILEOSTOMY;  Surgeon: Uriel Hung MD;  Location: [facility] OR       Problem list:    Patient Active Problem List    Diagnosis Date Noted     Chronic kidney disease, stage 3 08/18/2021     Priority: Medium     Follow-up examination after colorectal surgery 07/02/2021     Priority: Medium     Added automatically from request for surgery [id number]       Ulcerative pancolitis with complication (H) 04/28/2021     Priority: Medium     Added automatically from request for surgery [id number]         Medications:  Current Outpatient Medications   Medication Sig Dispense Refill     acetaminophen (TYLENOL) 500 MG tablet Take 2 tablets (1,000 mg) by mouth every 6 hours As scheduled for the next 5-7 days, then decrease both dose & frequency to as needed there after. 60 tablet 0     aspirin (ASA) 325 MG EC tablet Take 1 tablet (325 mg) by mouth daily       augmented betamethasone dipropionate (DIPROLENE) 0.05 % external lotion APPLY TO THE AFFECTED AREA OF SCALP TWICE DAILY UNTIL IMPROVED       BIOTIN PO Take 1 tablet by mouth daily Unknown strength       busPIRone (BUSPAR) 5 MG tablet 10 mg 2 times daily        carvedilol (COREG) 25 MG tablet Take 25 mg by mouth 2 times daily (with meals)        cholecalciferol 25 MCG (1000 UT) TABS Take 1,000 Units by mouth daily       dutasteride (AVODART) 0.5 MG capsule Take 0.5 mg by mouth 2 times daily        EPINEPHrine (ANY BX GENERIC EQUIV) 0.3 MG/0.3ML injection 2-pack INJECT 0.3 ML INTO THE MUSCLE ONE TIME PRF  ALLERGIC REACTION       escitalopram (LEXAPRO) 20 MG tablet Take 20 mg by mouth every morning       finasteride (PROSCAR) 5 MG tablet Take 5 mg by mouth daily        ketoconazole (NIZORAL) 2 % external shampoo APPLY TO AFFECTED AREAS ON SCALP ONCE EVERY OTHER WEEK AND THEN WASH OFF       nortriptyline (PAMELOR) 25 MG capsule Take 25 mg by mouth At Bedtime        oxyCODONE (ROXICODONE) 5 MG tablet Take 1 tablet (5 mg) by mouth every 6 hours as needed for moderate to severe pain 18 tablet 0       Allergies:  Allergies   Allergen Reactions     Warfarin Other (See Comments)     Internal bleeding reaction.     Wasp Venom Protein Angioedema and Other (See Comments)     Morphine Itching     Itching in throat per pt     Amlodipine Other (See Comments)     Edema         Family history:  Family History   Problem Relation Age of Onset     Matson's esophagus Father      Diabetes Mother      Hypertension Mother      Multiple myeloma Mother      Ulcerative Colitis Mother      Anesthesia Reaction Mother         post op delirium     Cervical Cancer Sister      Diabetes Type 2  Brother      Asthma Maternal Grandmother      Breast Cancer Maternal Grandmother      Breast Cancer Paternal Grandmother      Clotting Disorder No family hx of      Bleeding Disorder No family hx of        Social history:  Social History     Tobacco Use     Smoking status: Never Smoker     Smokeless tobacco: Never Used   Substance Use Topics     Alcohol use: Yes     Marital status: .    There are no exam notes on file for this visit.     20 minutes spent on the date of the encounter doing chart review, history and exam, documentation and further activities as noted above.   This is a postop visit.    LEVI Rowe, NP-C  Colon and Rectal Surgery  [facility]    This note was created using speech recognition software and may contain unintended word substitutions.

## 2021-08-25 NOTE — LETTER
2021       RE: Radha Jameson  [address]  [address]  [place] [address]     Dear Colleague,    Thank you for referring your patient, Radha Jameson, to the [facility] COLON AND RECTAL SURGERY CLINIC [place] at [facility]. Please see a copy of my visit note below.    Colon and Rectal Surgery Postoperative Clinic Note    RE: Radha Jameson  : 1960  JU: 2021    Radha Jameson is a very pleasant 60 year old female with PMH of congenitally absent IVC, h/o multiple DVTs, lupus anticoagulant, barretts s/p prior fundoplication, CKD stage 4 and medically refractory UC s/p prior TAC w/ EI in 10/2019 followed by robotic completion proctectomy with J-pouch formation with diverting loop ileostomy in 2021. Now s/p loop ileostomy takedown on 8/10/2021 with Dr. Hung. I saw her in clinic last week and she was having difficulty concentrating and felt forgetful. UA and BMP were WNL.    Interval history: Radha is still not feeling like herself. She is still having memory issues. She is not sleeping at night since she feels like she needs to pass stool all night. She gets up about 12 times a night and often only passes flatus or a small amount of stool. She is only having about 3-4 small bowel movements during the day. She is taking imodium a few times a day. No fevers or chills. She gets intense abdominal cramping that only lasts a few seconds and then resolves but is very painful. She also has some increasingly worse back pain that is new since surgery. She has not been able to take any naps during the day since she has been working and has not had time to try to regulate her bowel movements due to working.    Physical Examination: Exam was chaperoned by Kristin Delgado RN   LMP 2009   General: alert, oriented, in no acute distress  HEENT: mucous membranes moist  Abdomen: soft, non distended, non tender on palpation.  Takedown site without bleeding. Some resoling ecchymosis on abdomen. Umbilical hernia, easily reducible with visible peristalsis.  Extremities: warm, dry, no edema    Assessment/Plan:  60 year old female s/p loop ileostomy takedown on 8/10/2021 with Dr. Hung. Will check COVID test given her headaches, which I suggested last week but she was unable to do this due to her work schedule. She is not on narcotics. Asked her to see her PCP for intermittent leg swelling. Her bowel function sounds fairly normal for a J pouch. Recommended trying a fiber supplement and taking an extra imodium before bed. I am unsure about her back pain. Recommended wearing her binder again and follow up with her PCP. Can restart ASA and no longer need to pack takedown site-external gauze only. Will check thiamine levels per Dr. Hung's recommendation for confusion. I think this is likely related to fatigue and not sleeping at night. I recommended that she take some additional time off work so she get get proper rest and work on regulating her bowel habits better. Encouraged her to contact the clinic in the meantime with any questions or concerns.  Patient's questions were answered to her stated satisfaction and she is in agreement with this plan.    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     Ulcerative pancolitis with complication (H)        Surgical history:  Past Surgical History:   Procedure Laterality Date     BILATERAL OOPHORECTOMY  2009     BREAST SURGERY  2003    reconstruction       SECTION       COLECTOMY TOTAL       COLONOSCOPY       CV FEMORAL ANGIOGRAM       DAVINCI COLECTOMY N/A 6/10/2021    Procedure: ROBOT-ASSISTED  ileal pouch-anal anastomosis, diverting loop ileostomy;  Surgeon: Uriel Hung MD;  [facility]     EGD      , 2017     GASTRIC FUNDOPLICATION  2017     HIATAL HERNIA REPAIR  2017    LINX     INSERT STENT URETER N/A 6/10/2021    Procedure: INSERTION, STENT, URETER, PREOPERATIVE;  Surgeon: Joaquin Dixon MD;  [facility] OR     OVARIAN CYST REMOVAL       PELVIC LAPAROSCOPY       Removal of LINX       RLE Venogram/thrombolysis Right 05/15/2020     SIGMOIDOSCOPY FLEXIBLE N/A 6/10/2021    Procedure: SIGMOIDOSCOPY, FLEXIBLE;  Surgeon: Uriel Hung MD;  [facility]     TAKEDOWN ILEOSTOMY N/A 8/10/2021    Procedure: CLOSURE, ILEOSTOMY;  Surgeon: Uriel Hung MD;  [facility]       Problem list:    Patient Active Problem List    Diagnosis Date Noted     Chronic kidney disease, stage 3 2021     Priority: Medium     Follow-up examination after colorectal surgery 2021     Priority: Medium     Added automatically from request for surgery [id number]       Ulcerative pancolitis with complication (H) 2021     Priority: Medium     Added automatically from request for surgery [id number]         Medications:  Current Outpatient Medications   Medication Sig Dispense Refill     acetaminophen (TYLENOL) 500 MG tablet Take 2 tablets (1,000 mg) by mouth every 6 hours As scheduled for the next 5-7 days, then decrease both dose & frequency to as needed there after. 60 tablet 0     aspirin (ASA) 325 MG EC tablet Take 1 tablet (325 mg) by mouth daily       augmented betamethasone dipropionate (DIPROLENE) 0.05 % external lotion APPLY TO THE AFFECTED AREA OF SCALP TWICE DAILY UNTIL IMPROVED       BIOTIN PO Take 1 tablet by mouth daily Unknown strength       busPIRone (BUSPAR) 5 MG tablet 10 mg 2 times daily         carvedilol (COREG) 25 MG tablet Take 25 mg by mouth 2 times daily (with meals)        cholecalciferol 25 MCG (1000 UT) TABS Take 1,000 Units by mouth daily       dutasteride (AVODART) 0.5 MG capsule Take 0.5 mg by mouth 2 times daily        EPINEPHrine (ANY BX GENERIC EQUIV) 0.3 MG/0.3ML injection 2-pack INJECT 0.3 ML INTO THE MUSCLE ONE TIME PRF ALLERGIC REACTION       escitalopram (LEXAPRO) 20 MG tablet Take 20 mg by mouth every morning       finasteride (PROSCAR) 5 MG tablet Take 5 mg by mouth daily        ketoconazole (NIZORAL) 2 % external shampoo APPLY TO AFFECTED AREAS ON SCALP ONCE EVERY OTHER WEEK AND THEN WASH OFF       nortriptyline (PAMELOR) 25 MG capsule Take 25 mg by mouth At Bedtime        oxyCODONE (ROXICODONE) 5 MG tablet Take 1 tablet (5 mg) by mouth every 6 hours as needed for moderate to severe pain 18 tablet 0       Allergies:  Allergies   Allergen Reactions     Warfarin Other (See Comments)     Internal bleeding reaction.     Wasp Venom Protein Angioedema and Other (See Comments)     Morphine Itching     Itching in throat per pt     Amlodipine Other (See Comments)     Edema         Family history:  Family History   Problem Relation Age of Onset     Matson's esophagus Father      Diabetes Mother      Hypertension Mother      Multiple myeloma Mother      Ulcerative Colitis Mother      Anesthesia Reaction Mother         post op delirium     Cervical Cancer Sister      Diabetes Type 2  Brother      Asthma Maternal Grandmother      Breast Cancer Maternal Grandmother      Breast Cancer Paternal Grandmother      Clotting Disorder No family hx of      Bleeding Disorder No family hx of        Social history:  Social History     Tobacco Use     Smoking status: Never Smoker     Smokeless tobacco: Never Used   Substance Use Topics     Alcohol use: Yes     Marital status: .    There are no exam notes on file for this visit.     20 minutes spent on the date of the encounter doing chart review,  history and exam, documentation and further activities as noted above.   This is a postop visit.    LEVI Rowe, NP-C  Colon and Rectal Surgery  [facility]    This note was created using speech recognition software and may contain unintended word substitutions.        Again, thank you for allowing me to participate in the care of your patient.      Sincerely,    LEVI Rowe CNP

## 2021-08-26 LAB — SARS-COV-2 RNA RESP QL NAA+PROBE: NEGATIVE

## 2021-08-27 ENCOUNTER — TELEPHONE (OUTPATIENT)
Dept: GASTROENTEROLOGY | Facility: OUTPATIENT CENTER | Age: 61
End: 2021-08-27

## 2021-08-27 NOTE — TELEPHONE ENCOUNTER
Patient stated that her and Simi Chen NP discussed her being off work for yesterday and today. She wants to know if she can go back to work Monday or if she needs to see Simi Chen NP on Monday. Discussed with Simi Chen NP. She would like patient to have her BMs under control and to be able to be back on a normal sleep regimen before going back to work.     She will call on Monday to check in.

## 2021-08-27 NOTE — TELEPHONE ENCOUNTER
[facility]    Phone Message    May a detailed message be left on voicemail: yes     Reason for Call: Other: Radha is calling in asking for a call back from Kristin. She states that she is wondering what the next steps in her care will be. Please call back as soon as possible to discuss.     Action Taken: Message routed to:  [facility]Rectal    Travel Screening: Not Applicable

## 2021-08-30 ENCOUNTER — TELEPHONE (OUTPATIENT)
Dept: SURGERY | Facility: CLINIC | Age: 61
End: 2021-08-30

## 2021-08-30 DIAGNOSIS — R19.7 DIARRHEA: Primary | ICD-10-CM

## 2021-08-30 LAB — VIT B1 PYROPHOSHATE BLD-SCNC: 144 NMOL/L

## 2021-08-30 RX ORDER — LOPERAMIDE HYDROCHLORIDE 2 MG/1
TABLET ORAL
Qty: 60 TABLET | Refills: 3 | Status: SHIPPED | OUTPATIENT
Start: 2021-08-30

## 2021-08-30 NOTE — TELEPHONE ENCOUNTER
[facility]    Phone Message    May a detailed message be left on voicemail: yes     Reason for Call: Other:          Radha is calling to check in with the team to see how long she should be out of work.  Please call her back to discuss.     Radha would like to not go back until Wednesday if that is possible.         Action Taken: Message routed to:  [facility] CRS    Travel Screening: Not Applicable

## 2021-08-30 NOTE — TELEPHONE ENCOUNTER
I spoke with patient. She said she had good sleep for two nights but then last night she was up because she had to go to the bathroom. She reports drinking a green chalky medication before bed. She said she was discharged on it. I am unfamiliar with this medication. I don't see imodium on her discharge summary.     She is taking Maalox before bed. I prescribed imodium and told her to take 1 tablet at bedtime and increase to 2 tablets at bedtime

## 2021-08-31 ENCOUNTER — TELEPHONE (OUTPATIENT)
Dept: SURGERY | Facility: CLINIC | Age: 61
End: 2021-08-31

## 2021-08-31 NOTE — TELEPHONE ENCOUNTER
[facility]    Phone Message    May a detailed message be left on voicemail: yes     Reason for Call: Other: Radha is calling in asking for a call back from Kristin. Patient states that she spoke with Kristin on 8/30 about when she will be able to return to work, and would like to follow up with her. Please call back to discuss.     Action Taken: Message routed to:  [facility]/Rectal    Travel Screening: Not Applicable

## 2021-09-01 NOTE — TELEPHONE ENCOUNTER
I discussed the plan again with patient to do 2 tablets of imodium at night to help with getting up at night. She would like a letter for work. Will email one to her

## 2021-09-03 NOTE — TELEPHONE ENCOUNTER
[facility]    Phone Message    May a detailed message be left on voicemail: yes     Reason for Call: Other: Per pt would like if the the letter for back to work can be email asap to pt so she can bring or send to work . Please call pt back if any questions. Thank you.      Action Taken: Message routed to:  [facility] Gastro    Travel Screening: Not Applicable

## 2021-09-07 NOTE — TELEPHONE ENCOUNTER
[facility]    Phone Message    May a detailed message be left on voicemail: yes     Reason for Call: Other: Pt is requesting Kristin please give her a call once forms are complete and sent so she may return to work today. Please advise. Thank you!     Action Taken: Message routed to:  [facility] Colon and Rectal     Travel Screening: Not Applicable

## 2021-09-07 NOTE — TELEPHONE ENCOUNTER
Sent return to work letter to patient email and called to confirm with her.       Alondra Govea, CMA

## 2021-09-07 NOTE — TELEPHONE ENCOUNTER
[facility]    Phone Message    May a detailed message be left on voicemail: yes     Reason for Call: Other: Radha calling back. She states she has not received the letter so she can get back to work. Please email letter to [email or web address] as soon as possible. Please call Radha when letter has been sent. .      Action Taken: Message routed to:  [facility]    Travel Screening: Not Applicable

## 2021-09-29 NOTE — PROGRESS NOTES
"Colon and Rectal Surgery Follow-up Clinic Note      RE: Radha Jameson  : 1960  JU: 10/4/2021    DIAGNOSIS: 60 year old female with PMH of congenitally absent IVC, h/o multiple DVTs, lupus anticoagulant, barretts s/p prior fundoplication, CKD stage 4 and medically refractory UC s/p prior TAC w/ EI in 10/2019 followed by robotic completion proctectomy with J-pouch formation with diverting loop ileostomy in 2021. Now s/p loop ileostomy takedown on 8/10/2021.    INTERVAL HISTORY: Denies increased pain, fevers, or chills.  She is still feeling a bit fatigued and \"fuzzy\" since surgery but this is getting better. Tolerating diet well. Having 4-5 nonbloody mushy BM's during the day and about 5 bowel movements at night.  This has improved significantly since she has restarted her Imodium which she is taking 2 tablets 3 times daily. Off narcotic pain meds. She is only taking aspirin at this time and has frequent follow-up with her nephrologist and hematologist.    Physical Examination:  /88 (BP Location: Left arm, Patient Position: Sitting, Cuff Size: Adult Regular)   Pulse 55   Temp 97.4  F (36.3  C) (Oral)   Ht 5' 9\"   Wt 153 lb 6.4 oz   LMP 2009   SpO2 100%   BMI 22.65 kg/m    Abdomen soft, nontender. Incisions with no evidence of infection or hernia. Old stoma site almost completely healed. I do palpate a couple of Prolene sutures underneath the skin but there is no erosion through the skin.  There is also a 4 cm lower midline incisional hernia from her first operation.  This hernia is easily reducible.    ASSESSMENT  Doing well postop.    PLAN  1.  Regular diet.  Refer to dietitian for evaluation of deficiencies in trace elements and micronutrients.  Pouch patients tend to have a vitamin B complex deficiency and she may need to be tested for this and for her iron storage levels.  2.  Refer to hernia surgeon for elective repair of incisional hernia.    3.  No activity restrictions.  4.  " Flexible pouchoscopy with me in 3 months at [facility].      30 minutes spent on the date of the encounter doing chart review, history and exam, documentation and further activities as noted above.    Uriel Hung M.D., M.Sc.     Division of Colon and Rectal Surgery  [facility]    Referring Provider:  Jeff Silva MD  [facility]  [address]  [address]  [place] [address]      Primary Care Provider:  Hair Rivera     CC:  Sage Palma DO Hunt, Jennifer L, MD

## 2021-10-04 ENCOUNTER — OFFICE VISIT (OUTPATIENT)
Dept: SURGERY | Facility: CLINIC | Age: 61
End: 2021-10-04
Payer: COMMERCIAL

## 2021-10-04 VITALS
HEIGHT: 69 IN | WEIGHT: 153.4 LBS | DIASTOLIC BLOOD PRESSURE: 88 MMHG | OXYGEN SATURATION: 100 % | SYSTOLIC BLOOD PRESSURE: 124 MMHG | BODY MASS INDEX: 22.72 KG/M2 | TEMPERATURE: 97.4 F | HEART RATE: 55 BPM

## 2021-10-04 DIAGNOSIS — Z09 FOLLOW-UP EXAMINATION AFTER COLORECTAL SURGERY: Primary | ICD-10-CM

## 2021-10-04 PROCEDURE — 99024 POSTOP FOLLOW-UP VISIT: CPT | Performed by: COLON & RECTAL SURGERY

## 2021-10-04 RX ORDER — LOPERAMIDE HYDROCHLORIDE 2 MG/1
4 TABLET ORAL 3 TIMES DAILY PRN
Qty: 60 TABLET | Refills: 5 | Status: SHIPPED | OUTPATIENT
Start: 2021-10-04

## 2021-10-04 ASSESSMENT — MIFFLIN-ST. JEOR: SCORE: 1325.2

## 2021-10-04 ASSESSMENT — PAIN SCALES - GENERAL: PAINLEVEL: NO PAIN (0)

## 2021-10-04 NOTE — PATIENT INSTRUCTIONS
Follow up:    1. We have placed a dietitian referral for you - you can schedule this at the  as you leave  2. Referral to a hernia surgeon - they should call you to set up an appointment  3. Pouchoscopy with Dr Hung in 3 months- Please call Pura with surgery scheduling to schedule this   4. We ordered imodium for you - this will be available at your chosen pharmacy    Please call with any questions or concerns regarding your clinic visit today.    It is a pleasure being involved in your health care.    Schedule Clinic Appointments [phone] # 1   M-F 7:30 - 5 pm    MILY Foster [phone]    Clinic Fax Number [phone]    Surgery Scheduling [phone]    My Chart is available 24 hours a day and is a secure way to access your records and communicate with your care team.  I strongly recommend signing up if you haven't already done so, if you are comfortable with computers.  If you would like to inquire about this or are having problems with My Chart access, you may call [phone] or go online at [email or web address].  Please allow at least 24 hours for a response and extra time on weekends and Holidays.

## 2021-10-04 NOTE — TELEPHONE ENCOUNTER
REFERRAL INFORMATION:    Referring Provider:  Dr. Uriel Hung     Referring Clinic:  [facility]     Reason for Visit/Diagnosis: Incisional hernia        FUTURE VISIT INFORMATION:    Appointment Date: 10/5/2021    Appointment Time: 9 AM      NOTES RECORD STATUS  DETAILS   OFFICE NOTE from Referring Provider Internal 10/4/2021 Office visit with Dr. Hung     OFFICE NOTE from Other Specialists N/A    HOSPITAL DISCHARGE SUMMARY/ ED VISITS  N/A    OPERATIVE REPORT N/A    ENDOSCOPY (EGD)  N/A    PERTINENT LABS Internal    PATHOLOGY REPORTS (RELATED) N/A    IMAGING (CT, MRI, US, XR)  Internal MR Pelvis: 6/2/2021  MRA Pelvis: 6/2/2021  MRA Abdomen: 6/2/2021

## 2021-10-04 NOTE — NURSING NOTE
"Chief Complaint   Patient presents with     Surgical Followup     Post-op follow up, DOS:08/10/2021       Vitals:    10/04/21 1059   BP: 124/88   BP Location: Left arm   Patient Position: Sitting   Cuff Size: Adult Regular   Pulse: 55   Temp: 97.4  F (36.3  C)   TempSrc: Oral   SpO2: 100%   Weight: 153 lb 6.4 oz   Height: 5' 9\"       Body mass index is 22.65 kg/m .          Delores Linares CMA    "

## 2021-10-04 NOTE — LETTER
"10/4/2021       RE: Radha Jameson  [address]  [address]  [place] [address]     Dear Colleague,    Thank you for referring your patient, Radha Jameson, to the [facility] COLON AND RECTAL SURGERY CLINIC [place] at [facility]. Please see a copy of my visit note below.    Colon and Rectal Surgery Follow-up Clinic Note      RE: Radha Jameson  : 1960  JU: 10/4/2021    DIAGNOSIS: 60 year old female with PMH of congenitally absent IVC, h/o multiple DVTs, lupus anticoagulant, barretts s/p prior fundoplication, CKD stage 4 and medically refractory UC s/p prior TAC w/ EI in 10/2019 followed by robotic completion proctectomy with J-pouch formation with diverting loop ileostomy in 2021. Now s/p loop ileostomy takedown on 8/10/2021.    INTERVAL HISTORY: Denies increased pain, fevers, or chills.  She is still feeling a bit fatigued and \"fuzzy\" since surgery but this is getting better. Tolerating diet well. Having 4-5 nonbloody mushy BM's during the day and about 5 bowel movements at night.  This has improved significantly since she has restarted her Imodium which she is taking 2 tablets 3 times daily. Off narcotic pain meds. She is only taking aspirin at this time and has frequent follow-up with her nephrologist and hematologist.    Physical Examination:  /88 (BP Location: Left arm, Patient Position: Sitting, Cuff Size: Adult Regular)   Pulse 55   Temp 97.4  F (36.3  C) (Oral)   Ht 5' 9\"   Wt 153 lb 6.4 oz   LMP 2009   SpO2 100%   BMI 22.65 kg/m    Abdomen soft, nontender. Incisions with no evidence of infection or hernia. Old stoma site almost completely healed. I do palpate a couple of Prolene sutures underneath the skin but there is no erosion through the skin.  There is also a 4 cm lower midline incisional hernia from her first operation.  This hernia is easily reducible.    ASSESSMENT  Doing well " postop.    PLAN  1.  Regular diet.  Refer to dietitian for evaluation of deficiencies in trace elements and micronutrients.  Pouch patients tend to have a vitamin B complex deficiency and she may need to be tested for this and for her iron storage levels.  2.  Refer to hernia surgeon for elective repair of incisional hernia.    3.  No activity restrictions.  4.  Flexible pouchoscopy with me in 3 months at [facility].      30 minutes spent on the date of the encounter doing chart review, history and exam, documentation and further activities as noted above.    Uriel Hung M.D., M.Sc.     Division of Colon and Rectal Surgery  [facility]    Referring Provider:  Jeff Silva MD  [facility]  [address]  [address]  [place] [address]      Primary Care Provider:  Hair Rivera     CC:  Sage Palma DO Hunt, Jennifer L, MD      Again, thank you for allowing me to participate in the care of your patient.      Sincerely,    Uriel Hung MD

## 2021-10-05 ENCOUNTER — OFFICE VISIT (OUTPATIENT)
Dept: SURGERY | Facility: CLINIC | Age: 61
End: 2021-10-05
Payer: COMMERCIAL

## 2021-10-05 ENCOUNTER — PRE VISIT (OUTPATIENT)
Dept: SURGERY | Facility: CLINIC | Age: 61
End: 2021-10-05

## 2021-10-05 VITALS
HEIGHT: 69 IN | OXYGEN SATURATION: 98 % | SYSTOLIC BLOOD PRESSURE: 123 MMHG | DIASTOLIC BLOOD PRESSURE: 89 MMHG | WEIGHT: 153.3 LBS | HEART RATE: 52 BPM | BODY MASS INDEX: 22.71 KG/M2

## 2021-10-05 DIAGNOSIS — K43.9 VENTRAL HERNIA WITHOUT OBSTRUCTION OR GANGRENE: Primary | ICD-10-CM

## 2021-10-05 PROCEDURE — 99203 OFFICE O/P NEW LOW 30 MIN: CPT | Performed by: SURGERY

## 2021-10-05 RX ORDER — CEFAZOLIN SODIUM 2 G/50ML
2 SOLUTION INTRAVENOUS
Status: CANCELLED | OUTPATIENT
Start: 2021-10-05

## 2021-10-05 RX ORDER — CEFAZOLIN SODIUM 2 G/50ML
2 SOLUTION INTRAVENOUS SEE ADMIN INSTRUCTIONS
Status: CANCELLED | OUTPATIENT
Start: 2021-10-05

## 2021-10-05 ASSESSMENT — MIFFLIN-ST. JEOR: SCORE: 1324.74

## 2021-10-05 ASSESSMENT — PAIN SCALES - GENERAL: PAINLEVEL: NO PAIN (0)

## 2021-10-05 NOTE — PATIENT INSTRUCTIONS
You met with Dr. Yemi Del Rio.      Today's visit instructions:    Reminder:  Surgery Requirements  1. Your surgery will be at [facility] [place]- 5th Floor  2. You will need to arrive 1.5 hours early  3. You will need someone to drive you home (over 18 years old) and stay with you for 24 hours after the procedure.  4. You will need a preop physical with PAC (pre-anesthesia care) within 30 days of surgery- closer is always better.  5. Stop any blood thinners, vitamins, minerals, or herbal supplements 5 days before surgery.  If you are taking a prescribed blood thinner please let us know for specific instructions.  6. Fasting- a nurse from [facility] will call you 1-2 days before surgery to confirm your procedure and tell you when to stop eating and drinking.   7. Wash with the soap (Antibacterial, Dial Complete Foam, Hibiclense, or soap given/mailed from the clinic) the night before surgery and morning of surgery. See instructions in the Surgery Packet.  8. You will need to undergo a COVID-19 test 2-4 days prior to your scheduled procedure.  Our surgery scheduler will help arrange testing.  9. If you would like a procedure estimate please call [facility] Financial Counselor at [phone] or [phone].       If you have questions please contact Chelle RN or Francesca RN during regular clinic hours, Monday through Friday 7:30 AM - 4:00 PM, or you can contact us via [employer] at anytime.       If you have urgent needs after-hours, weekends, or holidays please call the hospital at [phone] and ask to speak with our on-call General Surgery Team.    Appointment [phone], option #1   Nurse Advice (Chelle or Francesca): [phone]   Surgery Scheduler (Bear): [phone]  Fax: [phone]    After Your Open Ventral Hernia Repair and Suture Removal        Incision care     You may take a shower the day after surgery. Carefully wash your incision with soap and water. Do not  submerge yourself in water (bath, whirlpool, hot tub, pool, lake) for 14 days after surgery. Always wash your hands before touching your incisions or removing bandages.    Remove the bandage the day after surgery, but leave the medical tape (Steri-Strips) or glue in place. These will loosen and fall off on their own 1-2 weeks after surgery.       It is not unusual to form a collection of fluid or blood under your incision that may feel firm or squishy- it can take several weeks to months for your body to reabsorb it.  At times, it may even drain.  If that should happen keep the area clean with soap, water,  and cover with a clean gauze dressing. You can change this daily or as needed.      Other medicines     Wait to start aspirin or blood thinners until the day after surgery. You can continue your regular medicines at your normal time the day after surgery.     Your pain medicine may cause constipation (hard, dry stools). To help with this, take the stool softener your doctor gave you or an over-the-counter stool softener or laxative. You can stop taking this when you are no longer taking pain medicine and your bowel movements are back to normal.      For pain or discomfort     Take the narcotic pain medicine your doctor gave you as needed and as instructed on the bottle. If you prefer to use over-the-counter medication, use acetaminophen (Tylenol) or ibuprofen (Advil, Motrin) as instructed on the box. Do not take Tylenol if it is in your narcotic pain medication.     Use an ice pack on your incision (surgical cut) for 20 minutes at a time as needed for the first 24 hours. Be sure to protect your skin by putting a cloth between the ice pack and your skin.     After 24 hours you can switch to heat for 20 minutes as needed. Be sure to protect your skin by putting a cloth between the heat pack and your skin.         Activities     No driving until you feel it s safe to do so. Don t drive while taking narcotic pain  medicine.     Don t lift anything heavier than 20 pounds for 3 to 4 weeks after surgery.      Special equipment     If we gave you an abdominal binder, wear it for the first 30 days after surgery. You don t have to wear it when you re sleeping. You can wear it longer than 30 days if you wish.      Diet     You can eat your regular meals after surgery.      When to call the doctor   Call your doctor if you have:     A fever above 101 F (38.3 C) (taken under the tongue), or a fever or chills lasting more than a day.     Redness at the incision site.     Any fluid or blood draining from the incision, especially if it smells bad.      Severe pain that doesn t improve with pain medicine.      We will call you 2 to 4 days after surgery to review this handout, answer questions and help arrange after-surgery care. If you have questions or concerns, please call [phone] during regular office hours. If you need to call after business hours, call [phone] and ask to page the surgeon on-call.             Transversus Abdominis Plane (TAP) Pain Block      What is a TAP block?   A TAP block can help you manage your pain after surgery. TAP stands for transversus abdominis plane, which is a muscle layer in your abdomen (belly). The TAP block uses numbing medicine similar to Novocaine to block pain near the site of your surgery.       Why get a TAP block?     To better manage your pain after surgery. A tap block will help keep the pain from getting severe and out of control.     To block pain signals from the nerve, which helps decrease pain after surgery.     To help you sleep, easily breathe deeply, walk and visit with others.      How is it done?   You will lie still on a table. We will use an ultrasound machine to help us see the correct muscle layer of your abdomen. Then, we ll use a needle to inject the medicine. We may also give you some sleep medicine to lessen the pain of the injection.       The procedure takes  between 5 and 15 minutes. It is usually done right before surgery, but will sometimes be after. It depends on your surgery and care needs.      What can I expect?     You may feel numbness, tingling or a heaviness in your abdomen.      You may have pain control up to 72 hours after surgery.     The TAP block may not lessen all of your surgery pain. But most patients feel 50 to 75 percent less pain than without the block.       Tell your nurse if you have:     Numbness or tingling in areas other than where the injection was     Blurry vision     Ringing in your ears     A metallic taste in your mouth                  syncopal episode

## 2021-10-05 NOTE — NURSING NOTE
Pre and Post op Patient Education/Teaching Flowsheet  Relevant Diagnosis:  Ventral hernia  Teaching Topic:  Pre and post op teaching  Person(s) Involved in teaching:  Patient     Motivation Level:  Asks Questions:  Yes  Eager to Learn:  Yes  Cooperative:  Yes  Receptive (willing/able to accept information):  Yes  Any cultural factors/Jain beliefs that may influence understanding or compliance?  No    Patient/caregiver/family demonstrates understanding of the following:  Reason for the appointment, diagnosis, and treatment plan:  Yes  Patient demonstrates understanding of the following:  Pre-op bowel prep:  N/A  Post-op pain management recommendations (medications, ice compress, binder/athletic supporter (if applicable), etc.:  Yes  Inguinal hernia patients:  Post-op urinary retention- discussed signs/symptoms and visit to ER for Valerio catheter placement and to stay in place for at least 48 hours:  NA  Restrictions:  Yes  Medications to take the day of surgery:  Per PCP  Blood thinner medications discussed and when to stop (if applicable):  Yes  Wound care:  Yes  Diabetes medication management (if applicable):  Per PCP  Which situations necessitate calling provider and whom to contact:  Discussed how to contact the hospital, nurse, and clinic scheduling staff if necessary      Date and time of surgery:  TBD  Location of surgery: [facility]- 5th Floor  History and Physical and any other testing necessary prior to surgery:  Yes, needs to be done by PAC  Required time line for completion of History and Physical and any pre-op testing:  Yes  Discuss need for someone to drive patient home and stay with them for 24 hours:  Yes  Pre-op showering/scrub information with Surgical Scrub:  Yes  NPO Guidelines:  NPO per Anesthesia Guidelines  COVID-19 Testing:  Yes    Infection Prevention: Patient demonstrates understanding of the following:  Patient instructed on hand hygiene:   Yes  Surgical procedure site care will be taught and will be reviewed at the time of discharge  Signs and symptoms of infection taught:  Yes  Wound care reviewed and will be taught at the time of discharge  Central venous catheter care will be taught at the time of discharge (if applicable)    Post-op follow-up:  Instructional materials used/given/mailed:  [place] Surgery Booklet, post op teaching sheet, Map, Soap, and arrival/location information    Surgical instructions mailed to patient

## 2021-10-05 NOTE — NURSING NOTE
"Chief Complaint   Patient presents with     Consult     Discuss incisional hernia.        Vitals:    10/05/21 0916   BP: 123/89   BP Location: Left arm   Patient Position: Sitting   Cuff Size: Adult Regular   Pulse: 52   SpO2: 98%   Weight: 69.5 kg (153 lb 4.8 oz)   Height: 1.753 m (5' 9\")       Body mass index is 22.64 kg/m .                          Tiffanie Oliveira, EMT    "

## 2021-10-05 NOTE — PROGRESS NOTES
Radha Jameson is a 61 year old female with a 2 year history of a lower mid  abdominal mass associated with the following symptoms of lump.      Onset did not occur with lifting.  Obstructive symptoms:  no  Urinary difficulties:  no  Chronic cough: no  Constipation:  no  Current level of activity:  Medium, wants to return to weight lifting, at home with family.    Past medical history, medications, allergies, family history, and social history were reviewed with the patient.    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     Ulcerative pancolitis with complication (H)        Past Surgical History:   Procedure Laterality Date     BILATERAL OOPHORECTOMY  2009     BREAST SURGERY      reconstruction      SECTION       COLECTOMY TOTAL       COLONOSCOPY       CV FEMORAL ANGIOGRAM       DAVINCI COLECTOMY N/A 6/10/2021    Procedure: ROBOT-ASSISTED  ileal pouch-anal anastomosis, diverting loop ileostomy;  Surgeon: Uriel Hung MD;  Location: [facility] OR     EGD      , 2017     GASTRIC FUNDOPLICATION  2017     HIATAL HERNIA REPAIR  2017    LINX     INSERT STENT URETER N/A 6/10/2021    Procedure: INSERTION, STENT, URETER, PREOPERATIVE;  Surgeon: Joaquin Dixon MD;  Location: [facility] OR     OVARIAN CYST REMOVAL       PELVIC LAPAROSCOPY       Removal of LINX       RLE Venogram/thrombolysis Right 05/15/2020     SIGMOIDOSCOPY FLEXIBLE N/A 6/10/2021    Procedure: SIGMOIDOSCOPY, FLEXIBLE;  Surgeon: Uriel Hung MD;  Location: [facility] OR     TAKEDOWN ILEOSTOMY N/A  8/10/2021    Procedure: CLOSURE, ILEOSTOMY;  Surgeon: Uriel Hung MD;  Location: [facility] OR       ROS: 10 point review of systems negative except noted in HPI  PHYSICAL EXAM  General appearance- healthy, alert, and in no distress.  Skin- Skin color and turgor normal.  No obvious rashes.  Neck- Neck is supple without obvious adenopathy.  Lungs- Respiratory effort unlabored.  Gait- Normal.  Abdomen - soft non distended, non tender with multiple scars and lower midline fascial defect palpable. Also with suture under skin at previous osotmy site.    Impression:  Hernia, incisional and ventral  Recommendation:  open surgery mesh repair and remove suture at same time from right anterior abdominal wall.    A full discussion regarding the alternatives, risks, goals, and potential complications for this surgery was completed today.  The patient understood that the potential problems included but are not limited to:  Infection, bleeding, hematoma, seroma, and recurrence.    The patient verbally expressed understanding, was given the opportunity for questions, and gives full informed consent for the procedure.      Today the patient was instructed on the need for a preoperative H&P, NPO status prior to surgery, and the need to stop anticoagulants prior to surgery.  Additional educational material, soap, and instructions will be mailed out to the patients home.    The total time spent with this patient was 30 minutes.  The total time was spent on the date of encounter doing chart review, history and physical, dressing changes, documentation, patient education, and any further activity as noted above.

## 2021-10-05 NOTE — LETTER
10/5/2021       RE: Radha Jameson  [address]  [address]  [place] [address]     Dear Colleague,    Thank you for referring your patient, Radha Jameson, to the [facility] GENERAL SURGERY CLINIC [place] at [facility]. Please see a copy of my visit note below.    Radha Jameson is a 61 year old female with a 2 year history of a lower mid  abdominal mass associated with the following symptoms of lump.      Onset did not occur with lifting.  Obstructive symptoms:  no  Urinary difficulties:  no  Chronic cough: no  Constipation:  no  Current level of activity:  Medium, wants to return to weight lifting, at home with family.    Past medical history, medications, allergies, family history, and social history were reviewed with the patient.    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     Ulcerative pancolitis with complication (H)        Past Surgical History:   Procedure Laterality Date     BILATERAL OOPHORECTOMY  2009     BREAST SURGERY  2003    reconstruction      SECTION       COLECTOMY TOTAL  2019     COLONOSCOPY       CV FEMORAL ANGIOGRAM       DAVINCI COLECTOMY N/A 6/10/2021    Procedure: ROBOT-ASSISTED  ileal pouch-anal anastomosis, diverting loop ileostomy;  Surgeon: Uriel Hung MD;  Location: [facility] OR     EGD      2016, 2017     GASTRIC FUNDOPLICATION  2017     HIATAL HERNIA REPAIR  2017    LINX     INSERT STENT URETER N/A 6/10/2021    Procedure: INSERTION, STENT, URETER, PREOPERATIVE;   Surgeon: Joaquin Dixon MD;  Location: [facility] OR     OVARIAN CYST REMOVAL  1988     PELVIC LAPAROSCOPY  1985     Removal of LINX       RLE Venogram/thrombolysis Right 05/15/2020     SIGMOIDOSCOPY FLEXIBLE N/A 6/10/2021    Procedure: SIGMOIDOSCOPY, FLEXIBLE;  Surgeon: Uriel Hung MD;  Location: [facility] OR     TAKEDOWN ILEOSTOMY N/A 8/10/2021    Procedure: CLOSURE, ILEOSTOMY;  Surgeon: Uriel Hung MD;  Location: [facility] OR       ROS: 10 point review of systems negative except noted in HPI  PHYSICAL EXAM  General appearance- healthy, alert, and in no distress.  Skin- Skin color and turgor normal.  No obvious rashes.  Neck- Neck is supple without obvious adenopathy.  Lungs- Respiratory effort unlabored.  Gait- Normal.  Abdomen - soft non distended, non tender with multiple scars and lower midline fascial defect palpable. Also with suture under skin at previous osotmy site.    Impression:  Hernia, incisional and ventral  Recommendation:  open surgery mesh repair and remove suture at same time from right anterior abdominal wall.    A full discussion regarding the alternatives, risks, goals, and potential complications for this surgery was completed today.  The patient understood that the potential problems included but are not limited to:  Infection, bleeding, hematoma, seroma, and recurrence.    The patient verbally expressed understanding, was given the opportunity for questions, and gives full informed consent for the procedure.      Today the patient was instructed on the need for a preoperative H&P, NPO status prior to surgery, and the need to stop anticoagulants prior to surgery.  Additional educational material, soap, and instructions will be mailed out to the patients home.    The total time spent with this patient was 30 minutes.  The total time was spent on the date of encounter doing chart review, history and physical, dressing changes, documentation, patient education, and any further activity  as noted above.                Again, thank you for allowing me to participate in the care of your patient.      Sincerely,    Yemi Del Rio MD

## 2021-10-06 ENCOUNTER — TELEPHONE (OUTPATIENT)
Dept: SURGERY | Facility: CLINIC | Age: 61
End: 2021-10-06

## 2021-10-06 DIAGNOSIS — Z11.59 ENCOUNTER FOR SCREENING FOR OTHER VIRAL DISEASES: ICD-10-CM

## 2021-10-06 PROBLEM — K43.9 VENTRAL HERNIA WITHOUT OBSTRUCTION OR GANGRENE: Status: ACTIVE | Noted: 2021-10-06

## 2021-10-06 NOTE — TELEPHONE ENCOUNTER
FUTURE VISIT INFORMATION      SURGERY INFORMATION:    Date: 10/25/21    [facility]    Surgeon:  Yemi Del Rio MD    Anesthesia Type:  Combined General with Tap Block    Procedure: HERNIORRHAPHY, VENTRAL, OPEN EXAM UNDER ANESTHESIA, WITH SUTURE OR STAPLE REMOVAL    Consult: ov 10/5/21    RECORDS REQUESTED FROM:       Primary Care Provider: Hair Rivera MBBS - [facility]    Most recent EKG+ Tracin20- [facility]

## 2021-10-06 NOTE — TELEPHONE ENCOUNTER
Patient is scheduled for surgery with Dr. Del Rio    Spoke with: Radha    Date of Surgery: 10/25/21    [facility]    Informed patient they will need an adult  yes    Pre op with Provider n/a    H&P: Scheduled with PAC 10/13/21    Pre-procedure COVID-19 Test: 10/23/21    Additional imaging/appointments: n/a    Surgery packet: Mailed 10/6/21     Additional comments: n/a

## 2021-10-07 ENCOUNTER — VIRTUAL VISIT (OUTPATIENT)
Dept: GASTROENTEROLOGY | Facility: CLINIC | Age: 61
End: 2021-10-07
Payer: COMMERCIAL

## 2021-10-07 DIAGNOSIS — N18.4 CHRONIC KIDNEY DISEASE, STAGE 4 (SEVERE) (H): ICD-10-CM

## 2021-10-07 DIAGNOSIS — K51.019 ULCERATIVE PANCOLITIS WITH COMPLICATION (H): Primary | ICD-10-CM

## 2021-10-07 DIAGNOSIS — K43.9 VENTRAL HERNIA WITHOUT OBSTRUCTION OR GANGRENE: ICD-10-CM

## 2021-10-07 DIAGNOSIS — R63.4 UNINTENDED WEIGHT LOSS: ICD-10-CM

## 2021-10-07 PROCEDURE — 97802 MEDICAL NUTRITION INDIV IN: CPT | Mod: GT | Performed by: DIETITIAN, REGISTERED

## 2021-10-07 NOTE — PATIENT INSTRUCTIONS
It was nice speaking with you today. Below are the nutrition recommendations we discussed at your visit.    Please let me know if you have any additional questions.    Nutrition Recommendations    1. Aim for eating 5-6 smaller meals per day.    -For example eat breakfast, morning snack, lunch, afternoon snack and dinner.  -Include some protein at most of these meals and snacks along with other foods such vegetables, fruits, fats and grains that you tolerate.    2. Drink at least 1 protein drink per day or more if you'd like. (this counts as one of those 5-6 smaller meals per day).  --can restart making your homemade protein drinks or can drink your carton of ready-to-drink option.    3. Continue drinking about 3/4 gallon of water/fluids per day.    4. While having many bowel movements daily, recommend drinking 2 cups of a sugar free/low sugar electrolyte drink option daily such as sugar free pedialyte or gatorade zero or powerade zero or other electrolyte drink you prefer (some are in ready to drink bottles and some are powders that you can add to some of your water).    5. Now that Dr. Hung told you okay to eat a regular diet, start adding in more vegetables, fruits along with protein foods and grains that you tolerate, but ss you start to incorporate more fiber in your diet to get to a more regular diet, gradually add in fiber.     --Also recommend incorporating some soluble fiber sources into your diet such as oats/oatmeal, avocado, ground flaxseed, berries, cooked carrots, applesauce, pears, sweet potato, white potato are just some examples with some soluble fiber.     Your follow up appointment is scheduled for November 23, 2021 at 9:00 AM. If you need to make any changes to your appointment, please call [phone].    Thank you,    Naina Doe, MS, RD, LD

## 2021-10-07 NOTE — LETTER
10/7/2021         RE: Radha Jameson  [address]  [address]  [place] [address]        Dear Colleague,    Thank you for referring your patient, Radha Jameson, to the [facility] [place]. Please see a copy of my visit note below.  [facility] Outpatient Medical Nutrition Therapy      Time Spent:  45 minutes  Session Type:  Initial   Referring Physician:  Uriel Hung MD  Reason for RD Visit:   Hx UC s/p J pouch, nutritional counseling     Nutrition Assessment:  Patient is a 61 year old female with history that includes congenitally absent IVC, h/o multiple DVTs, lupus anticoagulant, barretts s/p prior fundoplication, CKD stage 4 and medically refractory UC s/p prior TAC w/ EI in 10/2019 followed by robotic completion proctectomy with J-pouch formation with diverting loop ileostomy in June 2021. Now s/p loop ileostomy takedown on 8/10/2021.     She stated that she has ongoing issues with frequent stooling and reported having about 5 bowel movements during the day and then 4-5 bowel movements overnight.  She stated that she is taking a lot of Imodium so now her stools are getting better and less frequent than previously had been and also soft but more formed now.  She continues to feel a lot of fatigue since her last surgery in August.  She used to be a weight  and followed more of a weightlifting diet with high amounts of protein up to 140 g/day.  Most of her meals were very high protein and little carbohydrates.  She has recently added in some carbohydrates along with protein foods for some more energy, but she wonders if she is getting enough protein as she is eating about a fourth of what she is to eat.  And her recent MD visit earlier this week MD told her okay for regular diet, and previously she had been following a lower fiber diet.  She is planning to slowly add some fiber back into her diet but currently mostly some  "carbohydrate foods that are lower in fiber along with some protein at meals.  She previously drank some protein drinks but not currently doing that.  Occasionally she will add some for protein drink as a creamer into her small amount of coffee.  Additionally with waking up 4-5 times at night to have a bowel movement it is disrupting her sleep as well.  She stated that during the day she sits all day at her desk and seems to have less issues with stool but when she lies down at night she needs to use the bathroom and notices worsening issues with needing to have bowel movements.  At recent MD visit, and he mentioned possibly be vitamin and iron labs needed and referred patient to RD to discuss nutrition tips based on her symptoms and recent J-pouch and surgeries..    Patient Active Problem List   Diagnosis     Ulcerative pancolitis with complication (H)     Follow-up examination after colorectal surgery     Chronic kidney disease, stage 3 (H)     Ventral hernia without obstruction or gangrene     Height:   Ht Readings from Last 1 Encounters:   10/05/21 1.753 m (5' 9\")     Weight: reported losing weight since her last surgery in August. About 10 lbs.   Wt Readings from Last 10 Encounters:   10/05/21 69.5 kg (153 lb 4.8 oz)   10/04/21 69.6 kg (153 lb 6.4 oz)   08/18/21 74.2 kg (163 lb 8 oz)   08/10/21 71 kg (156 lb 8.4 oz)   07/26/21 71.5 kg (157 lb 9.6 oz)   07/26/21 71.7 kg (158 lb)   06/24/21 75.9 kg (167 lb 4.8 oz)   06/12/21 80.6 kg (177 lb 11.2 oz)   06/02/21 77.1 kg (170 lb)     BMI: Estimated body mass index is 22.64 kg/m  as calculated from the following:    Height as of 10/5/21: 1.753 m (5' 9\").    Weight as of 10/5/21: 69.5 kg (153 lb 4.8 oz).    Diet Recall:  (some usual/recent meals/snacks/beverages): not eating as much protein as she used to eat. She used to eat a lot of chicken but now mixing with some rice but now less chicken thinking the rice would give her some energy. Now eating some bread too " trying to get some energy. She will eat some pizza now and hamburger with bun. Previously didn't eat pizza or buns. Previously eating 4-5 meals per day.  Meal Food    Breakfast english muffin with [place] caruso and cheese   Lunch Chicken with rice   Dinner 1/4 chicken breast and rice or 1/3 frozen pizza or sushi (but hasn't done in [facility]) or if out to eat 1/2 hamburger with bun and 4-5 fries   Snacks Pretzels at night   Beverages 3/4 gallon Water, 1/2 c coffee   Alcohol Intake Sometimes a cocktail at night to help feel sleepy but then this may cause her to need to have BM        Labs:    Last Comprehensive Metabolic Panel:  Sodium   Date Value Ref Range Status   08/18/2021 142 133 - 144 mmol/L Final   06/24/2021 136 133 - 144 mmol/L Final     Potassium   Date Value Ref Range Status   08/18/2021 4.5 3.4 - 5.3 mmol/L Final   06/24/2021 4.9 3.4 - 5.3 mmol/L Final     Chloride   Date Value Ref Range Status   08/18/2021 108 94 - 109 mmol/L Final   06/24/2021 105 94 - 109 mmol/L Final     Carbon Dioxide   Date Value Ref Range Status   06/24/2021 28 20 - 32 mmol/L Final     Carbon Dioxide (CO2)   Date Value Ref Range Status   08/18/2021 31 20 - 32 mmol/L Final     Anion Gap   Date Value Ref Range Status   08/18/2021 3 3 - 14 mmol/L Final   06/24/2021 3 3 - 14 mmol/L Final     Glucose   Date Value Ref Range Status   08/18/2021 78 70 - 99 mg/dL Final   06/24/2021 132 (H) 70 - 99 mg/dL Final     Urea Nitrogen   Date Value Ref Range Status   08/18/2021 31 (H) 7 - 30 mg/dL Final   06/24/2021 29 7 - 30 mg/dL Final     Creatinine   Date Value Ref Range Status   08/18/2021 2.62 (H) 0.52 - 1.04 mg/dL Final   06/24/2021 2.80 (H) 0.52 - 1.04 mg/dL Final     GFR Estimate   Date Value Ref Range Status   08/18/2021 19 (L) >60 mL/min/1.73m2 Final     Comment:     As of July 11, 2021, eGFR is calculated by the CKD-EPI creatinine equation, without race adjustment. eGFR can be influenced by muscle mass, exercise, and diet. The reported  eGFR is an estimation only and is only applicable if the renal function is stable.   06/24/2021 18 (L) >60 mL/min/[1.73_m2] Final     Comment:     Non  GFR Calc  Starting 12/18/2018, serum creatinine based estimated GFR (eGFR) will be   calculated using the Chronic Kidney Disease Epidemiology Collaboration   (CKD-EPI) equation.       Calcium   Date Value Ref Range Status   08/18/2021 8.8 8.5 - 10.1 mg/dL Final   06/24/2021 8.6 8.5 - 10.1 mg/dL Final     CBC RESULTS:   Recent Labs   Lab Test 08/18/21  1114   WBC 7.2   RBC 3.50*   HGB 10.6*   HCT 32.8*   MCV 94   MCH 30.3   MCHC 32.3   RDW 17.6*          Pertinent Medications/vitamin and mineral supplements:     Current Outpatient Medications   Medication     acetaminophen (TYLENOL) 500 MG tablet     aspirin (ASA) 325 MG EC tablet     augmented betamethasone dipropionate (DIPROLENE) 0.05 % external lotion     BIOTIN PO     busPIRone (BUSPAR) 5 MG tablet     carvedilol (COREG) 25 MG tablet     cholecalciferol 25 MCG (1000 UT) TABS     dutasteride (AVODART) 0.5 MG capsule     EPINEPHrine (ANY BX GENERIC EQUIV) 0.3 MG/0.3ML injection 2-pack     escitalopram (LEXAPRO) 20 MG tablet     finasteride (PROSCAR) 5 MG tablet     ketoconazole (NIZORAL) 2 % external shampoo     loperamide (IMODIUM A-D) 2 MG tablet     loperamide (IMODIUM A-D) 2 MG tablet     nortriptyline (PAMELOR) 25 MG capsule     oxyCODONE (ROXICODONE) 5 MG tablet     No current facility-administered medications for this visit.       Food Allergies:  NKFA   Physical Activity:  Used to lift weight and has some light weight at home but she would like to start lifting again as she is able.    MALNUTRITION:  % Weight Loss:  Up to 5% in 1 month (non-severe malnutrition)  % Intake:  <75% for >/= 1 month (non-severe malnutrition)  Subcutaneous Fat Loss:  Unable to assess  Muscle Loss:  Unable to assess  Fluid Retention:  None noted    Malnutrition Diagnosis: Non-Severe malnutrition  In Context  of:  Chronic illness or disease    Nutrition Diagnosis:      Food and nutrition related knowledge deficit related to lack of previous diet education/lack of complete recall of previous diet education as evidenced by pt report and interest in diet education/review.    Nutrition Prescription: smaller more frequent meals with adequate calories and protein.    Nutrition Intervention:    Nutrition Education/Counseling:  Provided diet education with tips and suggestions for increasing calories and protein in a manner in which may be better tolerated. Recommended eating smaller more frequent meals, restart protein drinks and gradually incorporating more fiber slowly into diet as tolerated especially some soluble fiber sources. Reviewed some that she could include based on her food preferences and usual meals.    Educational Materials Provided:  NCM: soluble and insoluble fiber handout and AVS  Patient verbalized understanding of education provided. See Goals below.     Goals:    1. Aim for eating 5-6 smaller meals per day.    -For example eat breakfast, morning snack, lunch, afternoon snack and dinner.  -Include some protein at most of these meals and snacks along with other foods such vegetables, fruits, fats and grains that you tolerate.    2. Drink at least 1 protein drink per day or more if you'd like. (this counts as one of those 5-6 smaller meals per day).  --can restart making your homemade protein drinks or can drink your carton of ready-to-drink option.    3. Continue drinking about 3/4 gallon of water/fluids per day.    4. While having many bowel movements daily, recommend drinking 2 cups of a sugar free/low sugar electrolyte drink option daily such as sugar free pedialyte or gatorade zero or powerade zero or other electrolyte drink you prefer (some are in ready to drink bottles and some are powders that you can add to some of your water).    5. Now that Dr. Hung told you okay to eat a regular diet, start  adding in more vegetables, fruits along with protein foods and grains that you tolerate, but ss you start to incorporate more fiber in your diet to get to a more regular diet, gradually add in fiber.     --Also recommend incorporating some soluble fiber sources into your diet such as oats/oatmeal, avocado, ground flaxseed, berries, cooked carrots, applesauce, pears, sweet potato, white potato are just some examples with some soluble fiber.    Nutrition Monitoring and Evaluation: Will monitor adherence to nutrition recommendations at future RD visits.     Further Medical Nutrition Therapy:  Follow-up scheduled 11/23 at 9 AM    Patient was encouraged to call/contact RD with any further questions.    Naina Doe, MS, RD, LD          Again, thank you for allowing me to participate in the care of your patient.      Sincerely,    Naina Doe RD

## 2021-10-07 NOTE — PROGRESS NOTES
"Radha Jameson is a 61 year old female who is being evaluated via a billable telephone visit.     The patient has been notified of following:     \"This telephone visit will be conducted via a call between you and your physician/provider. We have found that certain health care needs can be provided without the need for a physical exam.  This service lets us provide the care you need with a short phone conversation.  If a prescription is necessary we can send it directly to your pharmacy.  If lab work is needed we can place an order for that and you can then stop by our lab to have the test done at a later time.    Telephone visits are billed at different rates depending on your insurance coverage. During this emergency period, for some insurers they may be billed the same as an in-person visit.  Please reach out to your insurance provider with any questions.    If during the course of the call the physician/provider feels a telephone visit is not appropriate, you will not be charged for this service.\"    Patient has given verbal consent for Telephone visit?  Yes    How would you like to obtain your AVS? mailed    Phone call duration: 45 minutes    During this virtual visit the patient is located in [place], patient verifies this as the location during the entirety of this visit.     [facility] Outpatient Medical Nutrition Therapy      Time Spent:  45 minutes  Session Type:  Initial   Referring Physician:  Uriel Hung MD  Reason for RD Visit:   Hx UC s/p J pouch, nutritional counseling     Nutrition Assessment:  Patient is a 61 year old female with history that includes congenitally absent IVC, h/o multiple DVTs, lupus anticoagulant, barretts s/p prior fundoplication, CKD stage 4 and medically refractory UC s/p prior TAC w/ EI in 10/2019 followed by robotic completion proctectomy with J-pouch formation with diverting loop ileostomy in June 2021. Now s/p loop ileostomy takedown on 8/10/2021.     She " stated that she has ongoing issues with frequent stooling and reported having about 5 bowel movements during the day and then 4-5 bowel movements overnight.  She stated that she is taking a lot of Imodium so now her stools are getting better and less frequent than previously had been and also soft but more formed now.  She continues to feel a lot of fatigue since her last surgery in August.  She used to be a weight  and followed more of a weightlifting diet with high amounts of protein up to 140 g/day.  Most of her meals were very high protein and little carbohydrates.  She has recently added in some carbohydrates along with protein foods for some more energy, but she wonders if she is getting enough protein as she is eating about a fourth of what she is to eat.  And her recent MD visit earlier this week MD told her okay for regular diet, and previously she had been following a lower fiber diet.  She is planning to slowly add some fiber back into her diet but currently mostly some carbohydrate foods that are lower in fiber along with some protein at meals.  She previously drank some protein drinks but not currently doing that.  Occasionally she will add some for protein drink as a creamer into her small amount of coffee.  Additionally with waking up 4-5 times at night to have a bowel movement it is disrupting her sleep as well.  She stated that during the day she sits all day at her desk and seems to have less issues with stool but when she lies down at night she needs to use the bathroom and notices worsening issues with needing to have bowel movements.  At recent MD visit, and he mentioned possibly be vitamin and iron labs needed and referred patient to RD to discuss nutrition tips based on her symptoms and recent J-pouch and surgeries..    Patient Active Problem List   Diagnosis     Ulcerative pancolitis with complication (H)     Follow-up examination after colorectal surgery     Chronic kidney disease,  "stage 3 (H)     Ventral hernia without obstruction or gangrene     Height:   Ht Readings from Last 1 Encounters:   10/05/21 1.753 m (5' 9\")     Weight: reported losing weight since her last surgery in August. About 10 lbs.   Wt Readings from Last 10 Encounters:   10/05/21 69.5 kg (153 lb 4.8 oz)   10/04/21 69.6 kg (153 lb 6.4 oz)   08/18/21 74.2 kg (163 lb 8 oz)   08/10/21 71 kg (156 lb 8.4 oz)   07/26/21 71.5 kg (157 lb 9.6 oz)   07/26/21 71.7 kg (158 lb)   06/24/21 75.9 kg (167 lb 4.8 oz)   06/12/21 80.6 kg (177 lb 11.2 oz)   06/02/21 77.1 kg (170 lb)     BMI: Estimated body mass index is 22.64 kg/m  as calculated from the following:    Height as of 10/5/21: 1.753 m (5' 9\").    Weight as of 10/5/21: 69.5 kg (153 lb 4.8 oz).    Diet Recall:  (some usual/recent meals/snacks/beverages): not eating as much protein as she used to eat. She used to eat a lot of chicken but now mixing with some rice but now less chicken thinking the rice would give her some energy. Now eating some bread too trying to get some energy. She will eat some pizza now and hamburger with bun. Previously didn't eat pizza or buns. Previously eating 4-5 meals per day.  Meal Food    Breakfast english muffin with [place] caruso and cheese   Lunch Chicken with rice   Dinner 1/4 chicken breast and rice or 1/3 frozen pizza or sushi (but hasn't done in [facility]) or if out to eat 1/2 hamburger with bun and 4-5 fries   Snacks Pretzels at night   Beverages 3/4 gallon Water, 1/2 c coffee   Alcohol Intake Sometimes a cocktail at night to help feel sleepy but then this may cause her to need to have BM        Labs:    Last Comprehensive Metabolic Panel:  Sodium   Date Value Ref Range Status   08/18/2021 142 133 - 144 mmol/L Final   06/24/2021 136 133 - 144 mmol/L Final     Potassium   Date Value Ref Range Status   08/18/2021 4.5 3.4 - 5.3 mmol/L Final   06/24/2021 4.9 3.4 - 5.3 mmol/L Final     Chloride   Date Value Ref Range Status   08/18/2021 108 94 - 109 " mmol/L Final   06/24/2021 105 94 - 109 mmol/L Final     Carbon Dioxide   Date Value Ref Range Status   06/24/2021 28 20 - 32 mmol/L Final     Carbon Dioxide (CO2)   Date Value Ref Range Status   08/18/2021 31 20 - 32 mmol/L Final     Anion Gap   Date Value Ref Range Status   08/18/2021 3 3 - 14 mmol/L Final   06/24/2021 3 3 - 14 mmol/L Final     Glucose   Date Value Ref Range Status   08/18/2021 78 70 - 99 mg/dL Final   06/24/2021 132 (H) 70 - 99 mg/dL Final     Urea Nitrogen   Date Value Ref Range Status   08/18/2021 31 (H) 7 - 30 mg/dL Final   06/24/2021 29 7 - 30 mg/dL Final     Creatinine   Date Value Ref Range Status   08/18/2021 2.62 (H) 0.52 - 1.04 mg/dL Final   06/24/2021 2.80 (H) 0.52 - 1.04 mg/dL Final     GFR Estimate   Date Value Ref Range Status   08/18/2021 19 (L) >60 mL/min/1.73m2 Final     Comment:     As of July 11, 2021, eGFR is calculated by the CKD-EPI creatinine equation, without race adjustment. eGFR can be influenced by muscle mass, exercise, and diet. The reported eGFR is an estimation only and is only applicable if the renal function is stable.   06/24/2021 18 (L) >60 mL/min/[1.73_m2] Final     Comment:     Non  GFR Calc  Starting 12/18/2018, serum creatinine based estimated GFR (eGFR) will be   calculated using the Chronic Kidney Disease Epidemiology Collaboration   (CKD-EPI) equation.       Calcium   Date Value Ref Range Status   08/18/2021 8.8 8.5 - 10.1 mg/dL Final   06/24/2021 8.6 8.5 - 10.1 mg/dL Final     CBC RESULTS:   Recent Labs   Lab Test 08/18/21  1114   WBC 7.2   RBC 3.50*   HGB 10.6*   HCT 32.8*   MCV 94   MCH 30.3   MCHC 32.3   RDW 17.6*          Pertinent Medications/vitamin and mineral supplements:     Current Outpatient Medications   Medication     acetaminophen (TYLENOL) 500 MG tablet     aspirin (ASA) 325 MG EC tablet     augmented betamethasone dipropionate (DIPROLENE) 0.05 % external lotion     BIOTIN PO     busPIRone (BUSPAR) 5 MG tablet      carvedilol (COREG) 25 MG tablet     cholecalciferol 25 MCG (1000 UT) TABS     dutasteride (AVODART) 0.5 MG capsule     EPINEPHrine (ANY BX GENERIC EQUIV) 0.3 MG/0.3ML injection 2-pack     escitalopram (LEXAPRO) 20 MG tablet     finasteride (PROSCAR) 5 MG tablet     ketoconazole (NIZORAL) 2 % external shampoo     loperamide (IMODIUM A-D) 2 MG tablet     loperamide (IMODIUM A-D) 2 MG tablet     nortriptyline (PAMELOR) 25 MG capsule     oxyCODONE (ROXICODONE) 5 MG tablet     No current facility-administered medications for this visit.       Food Allergies:  NKFA   Physical Activity:  Used to lift weight and has some light weight at home but she would like to start lifting again as she is able.    MALNUTRITION:  % Weight Loss:  Up to 5% in 1 month (non-severe malnutrition)  % Intake:  <75% for >/= 1 month (non-severe malnutrition)  Subcutaneous Fat Loss:  Unable to assess  Muscle Loss:  Unable to assess  Fluid Retention:  None noted    Malnutrition Diagnosis: Non-Severe malnutrition  In Context of:  Chronic illness or disease    Nutrition Diagnosis:      Food and nutrition related knowledge deficit related to lack of previous diet education/lack of complete recall of previous diet education as evidenced by pt report and interest in diet education/review.    Nutrition Prescription: smaller more frequent meals with adequate calories and protein.    Nutrition Intervention:    Nutrition Education/Counseling:  Provided diet education with tips and suggestions for increasing calories and protein in a manner in which may be better tolerated. Recommended eating smaller more frequent meals, restart protein drinks and gradually incorporating more fiber slowly into diet as tolerated especially some soluble fiber sources. Reviewed some that she could include based on her food preferences and usual meals.    Educational Materials Provided:  NCM: soluble and insoluble fiber handout and AVS  Patient verbalized understanding of  education provided. See Goals below.     Goals:    1. Aim for eating 5-6 smaller meals per day.    -For example eat breakfast, morning snack, lunch, afternoon snack and dinner.  -Include some protein at most of these meals and snacks along with other foods such vegetables, fruits, fats and grains that you tolerate.    2. Drink at least 1 protein drink per day or more if you'd like. (this counts as one of those 5-6 smaller meals per day).  --can restart making your homemade protein drinks or can drink your carton of ready-to-drink option.    3. Continue drinking about 3/4 gallon of water/fluids per day.    4. While having many bowel movements daily, recommend drinking 2 cups of a sugar free/low sugar electrolyte drink option daily such as sugar free pedialyte or gatorade zero or powerade zero or other electrolyte drink you prefer (some are in ready to drink bottles and some are powders that you can add to some of your water).    5. Now that Dr. Hung told you okay to eat a regular diet, start adding in more vegetables, fruits along with protein foods and grains that you tolerate, but ss you start to incorporate more fiber in your diet to get to a more regular diet, gradually add in fiber.     --Also recommend incorporating some soluble fiber sources into your diet such as oats/oatmeal, avocado, ground flaxseed, berries, cooked carrots, applesauce, pears, sweet potato, white potato are just some examples with some soluble fiber.    Nutrition Monitoring and Evaluation: Will monitor adherence to nutrition recommendations at future RD visits.     Further Medical Nutrition Therapy:  Follow-up scheduled 11/23 at 9 AM    Patient was encouraged to call/contact RD with any further questions.    Naina Doe, MS, RD, LD

## 2021-10-13 ENCOUNTER — OFFICE VISIT (OUTPATIENT)
Dept: SURGERY | Facility: CLINIC | Age: 61
End: 2021-10-13
Payer: COMMERCIAL

## 2021-10-13 ENCOUNTER — PRE VISIT (OUTPATIENT)
Dept: SURGERY | Facility: CLINIC | Age: 61
End: 2021-10-13

## 2021-10-13 ENCOUNTER — ANESTHESIA EVENT (OUTPATIENT)
Dept: SURGERY | Facility: CLINIC | Age: 61
End: 2021-10-13

## 2021-10-13 ENCOUNTER — LAB (OUTPATIENT)
Dept: LAB | Facility: CLINIC | Age: 61
End: 2021-10-13
Payer: COMMERCIAL

## 2021-10-13 VITALS
SYSTOLIC BLOOD PRESSURE: 144 MMHG | RESPIRATION RATE: 16 BRPM | OXYGEN SATURATION: 100 % | TEMPERATURE: 97.7 F | HEIGHT: 69 IN | BODY MASS INDEX: 22.78 KG/M2 | WEIGHT: 153.8 LBS | HEART RATE: 56 BPM | DIASTOLIC BLOOD PRESSURE: 95 MMHG

## 2021-10-13 DIAGNOSIS — K43.9 VENTRAL HERNIA WITHOUT OBSTRUCTION OR GANGRENE: ICD-10-CM

## 2021-10-13 DIAGNOSIS — Z09 FOLLOW-UP EXAMINATION AFTER COLORECTAL SURGERY: ICD-10-CM

## 2021-10-13 DIAGNOSIS — Z01.818 PRE-OP EXAMINATION: ICD-10-CM

## 2021-10-13 DIAGNOSIS — Z01.818 PRE-OP EXAMINATION: Primary | ICD-10-CM

## 2021-10-13 DIAGNOSIS — E86.0 DEHYDRATION: ICD-10-CM

## 2021-10-13 LAB
CREAT SERPL-MCNC: 2.75 MG/DL (ref 0.52–1.04)
GFR SERPL CREATININE-BSD FRML MDRD: 18 ML/MIN/1.73M2
HGB BLD-MCNC: 11.4 G/DL (ref 11.7–15.7)
POTASSIUM BLD-SCNC: 4.3 MMOL/L (ref 3.4–5.3)

## 2021-10-13 PROCEDURE — 99215 OFFICE O/P EST HI 40 MIN: CPT | Mod: 24 | Performed by: PHYSICIAN ASSISTANT

## 2021-10-13 PROCEDURE — 36415 COLL VENOUS BLD VENIPUNCTURE: CPT | Performed by: PATHOLOGY

## 2021-10-13 PROCEDURE — 84132 ASSAY OF SERUM POTASSIUM: CPT | Performed by: PATHOLOGY

## 2021-10-13 PROCEDURE — 82607 VITAMIN B-12: CPT | Performed by: PATHOLOGY

## 2021-10-13 PROCEDURE — 85018 HEMOGLOBIN: CPT | Performed by: PATHOLOGY

## 2021-10-13 PROCEDURE — 82565 ASSAY OF CREATININE: CPT | Performed by: PATHOLOGY

## 2021-10-13 ASSESSMENT — MIFFLIN-ST. JEOR: SCORE: 1327.01

## 2021-10-13 ASSESSMENT — LIFESTYLE VARIABLES: TOBACCO_USE: 0

## 2021-10-13 ASSESSMENT — ENCOUNTER SYMPTOMS: SEIZURES: 0

## 2021-10-13 ASSESSMENT — PAIN SCALES - GENERAL: PAINLEVEL: NO PAIN (0)

## 2021-10-13 NOTE — H&P (VIEW-ONLY)
Pre-Operative H & P     CC:  Preoperative exam to assess for increased cardiopulmonary risk while undergoing surgery and anesthesia.    Date of Encounter: 10/13/2021  Primary Care Physician:  Hair Rivera     Reason for visit:   Encounter Diagnoses   Name Primary?     Pre-op examination Yes     Ventral hernia without obstruction or gangrene        CARMEN Jameson is a 61 year old female who presents for pre-operative H & P in preparation for HERNIORRHAPHY, VENTRAL, OPEN, EXAM UNDER ANESTHESIA, WITH SUTURE OR STAPLE REMOVAL with Dr. Del Rio on 11/11/21 at [facility] [place].     The patient is a 61-year-old woman with a past medical history significant for ulcerative pancolitis refractory to medication status post total abdominal colectomy in 2019, completion proctectomy and J-pouch with diverting loop ileostomy in June 2021 and ileostomy closure on 8/10/2021.  She has a past medical history also significant for hypertension, multiple DVTs, portal vein thrombosis, SMV thrombosis, absent IVC, lupus anticoagulant, anemia, history of transfusion, history of thyrotoxicosis, GERD, Matson's esophagus,  status post fundoplication, chronic kidney disease stage IV, PCOS, chronic pain, fibromyalgia, degenerative joint disease, anxiety and depression.  She met with Dr. Del Rio on 10/5/2021 for ongoing concerns with lower mid abdominal mass.  On exam she was found to have a lower midline fascial defect and a suture under the skin at the previous ostomy site.  They discussed her treatment options and the patient has been scheduled for the procedure as above.    History is obtained from the patient and chart review      Hx of abnormal bleeding or anti-platelet use:  mg    Menstrual history: Patient's last menstrual period was 01/01/2009.:     Prior to Admission Medications  Current Outpatient Medications   Medication Sig Dispense Refill     acetaminophen (TYLENOL) 500 MG tablet Take 2 tablets  (1,000 mg) by mouth every 6 hours As scheduled for the next 5-7 days, then decrease both dose & frequency to as needed there after. 60 tablet 0     aspirin (ASA) 325 MG EC tablet Take 325 mg by mouth every evening        augmented betamethasone dipropionate (DIPROLENE) 0.05 % external lotion 2 times daily        BIOTIN PO Take 1 tablet by mouth 2 times daily Unknown strength       busPIRone (BUSPAR) 5 MG tablet 10 mg 2 times daily        carvedilol (COREG) 25 MG tablet Take 25 mg by mouth 2 times daily (with meals)        dutasteride (AVODART) 0.5 MG capsule Take 0.5 mg by mouth 2 times daily        EPINEPHrine (ANY BX GENERIC EQUIV) 0.3 MG/0.3ML injection 2-pack once as needed for anaphylaxis        escitalopram (LEXAPRO) 20 MG tablet Take 20 mg by mouth every morning       finasteride (PROSCAR) 5 MG tablet Take 5 mg by mouth every morning        ketoconazole (NIZORAL) 2 % external shampoo daily as needed        loperamide (IMODIUM A-D) 2 MG tablet Take 1 tablet before bedtime. Can increase to 2 tablets before bedtime (Patient taking differently: 2 times daily as needed Take 1 tablet before bedtime. Can increase to 2 tablets before bedtime) 60 tablet 3     nortriptyline (PAMELOR) 25 MG capsule Take 25 mg by mouth At Bedtime        cholecalciferol 25 MCG (1000 UT) TABS Take 1,000 Units by mouth daily (Patient not taking: Reported on 10/13/2021)       loperamide (IMODIUM A-D) 2 MG tablet Take 2 tablets (4 mg) by mouth 3 times daily as needed for diarrhea (Patient not taking: Reported on 10/13/2021) 60 tablet 5     oxyCODONE (ROXICODONE) 5 MG tablet Take 1 tablet (5 mg) by mouth every 6 hours as needed for moderate to severe pain (Patient not taking: Reported on 10/4/2021) 18 tablet 0       Family History  Family History   Problem Relation Age of Onset     Matson's esophagus Father      Diabetes Mother      Hypertension Mother      Multiple myeloma Mother      Ulcerative Colitis Mother      Anesthesia Reaction Mother          post op delirium     Cervical Cancer Sister      Diabetes Type 2  Brother      Asthma Maternal Grandmother      Breast Cancer Maternal Grandmother      Breast Cancer Paternal Grandmother      Clotting Disorder No family hx of      Bleeding Disorder No family hx of        The complete review of systems is negative other than noted in the HPI or here.     Preprocedure Note     Last edited 10/13/21 1033 by Patricia Coy PA-C  Date of Service 10/13/21 0820  Creation Time 10/13/21 0820  Status: Addendum             Anesthesia Pre-Procedure Evaluation    Patient: Radha Jameson   MRN: [id number] : 1960        Preoperative Diagnosis: * No surgery found *    Procedure :   PAC EVALUATION       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     Ulcerative pancolitis with complication (H)       Past Surgical History:   Procedure Laterality Date     BILATERAL OOPHORECTOMY  2009     BREAST SURGERY  2003    reconstruction      SECTION       COLECTOMY TOTAL  2019     COLONOSCOPY       CV FEMORAL ANGIOGRAM       DAVINCI COLECTOMY N/A 6/10/2021    Procedure: ROBOT-ASSISTED  ileal pouch-anal anastomosis, diverting loop ileostomy;  Surgeon: Uriel Hung MD;  Location: [facility] OR     EGD      , 2017     GASTRIC FUNDOPLICATION  2017     HIATAL HERNIA REPAIR  2017    LINX     INSERT STENT URETER N/A 6/10/2021    Procedure: INSERTION, STENT, URETER, PREOPERATIVE;  Surgeon: Joaquin Dixon MD;  Location: [facility] OR     OVARIAN CYST REMOVAL       PELVIC LAPAROSCOPY        Removal of LINX       RLE Venogram/thrombolysis Right 05/15/2020     SIGMOIDOSCOPY FLEXIBLE N/A 6/10/2021    Procedure: SIGMOIDOSCOPY, FLEXIBLE;  Surgeon: Uriel Hung MD;  Location: [facility] OR     TAKEDOWN ILEOSTOMY N/A 8/10/2021    Procedure: CLOSURE, ILEOSTOMY;  Surgeon: Uriel Hung MD;  Location: [facility] OR      Allergies   Allergen Reactions     Warfarin Other (See Comments)     Internal bleeding reaction.     Wasp Venom Protein Angioedema and Other (See Comments)     Morphine Itching     Itching in throat per pt     Amlodipine Other (See Comments)     Edema        Social History     Tobacco Use     Smoking status: Never Smoker     Smokeless tobacco: Never Used   Substance Use Topics     Alcohol use: Yes      Wt Readings from Last 1 Encounters:   10/05/21 69.5 kg (153 lb 4.8 oz)        Anesthesia Evaluation   Pt has had prior anesthetic. Type: General and MAC.    History of anesthetic complications  - PONV.  Two episodes of hypotension during surgery.    ROS/MED HX  ENT/Pulmonary:     (+) NICKI risk factors, snores loudly, hypertension,  (-) tobacco use   Neurologic:  - neg neurologic ROS  (-) no seizures, no CVA and no TIA   Cardiovascular:     (+) hypertension-----Taking blood thinners Previous cardiac testing   Echo: Date: Results:    Stress Test: Date: Results:    ECG Reviewed: Date: 7/26/20 Results:  SB  Cath: Date: Results:      METS/Exercise Tolerance: >4 METS    Hematologic: Comments: Lupus anticoagulant     (+) History of blood clots, pt is not anticoagulated, anemia, history of blood transfusion, no previous transfusion reaction, Known PRBC Anitbodies:No     Musculoskeletal: Comment: Firbromyalgia, neck pain, DJD      GI/Hepatic: Comment: History of Matson's  Denies GERD symptoms  S/p Fundoplication     S/p TAC, s/p completion proctectomy with J pouch and DLE, s/p takedown of DLE.     (+) Inflammatory bowel disease,  (-) GERD   Renal/Genitourinary: Comment: PCOS - s/p bilateral  "oophorectomy     (+) renal disease, type: CRI, Pt does not require dialysis,     Endo:     (+) thyroid problem,  Thyroid disease - Other history of thyrotoxicosis due to herbal supplement,     Psychiatric/Substance Use:     (+) psychiatric history anxiety and depression     Infectious Disease:  - neg infectious disease ROS     Malignancy:  - neg malignancy ROS     Other:  - neg other ROS    (+) , H/O Chronic Pain,          OUTSIDE LABS:  CBC:   Lab Results   Component Value Date    WBC 7.2 08/18/2021    WBC 6.2 07/26/2021    HGB 10.6 (L) 08/18/2021    HGB 12.0 07/26/2021    HCT 32.8 (L) 08/18/2021    HCT 38.3 07/26/2021     08/18/2021     08/11/2021     BMP:   Lab Results   Component Value Date     08/18/2021     08/11/2021    POTASSIUM 4.5 08/18/2021    POTASSIUM 4.8 08/11/2021    CHLORIDE 108 08/18/2021    CHLORIDE 107 08/11/2021    CO2 31 08/18/2021    CO2 21 08/11/2021    BUN 31 (H) 08/18/2021    BUN 35 (H) 08/11/2021    CR 2.62 (H) 08/18/2021    CR 3.10 (H) 08/11/2021    GLC 78 08/18/2021     (H) 08/11/2021     COAGS:   Lab Results   Component Value Date    PTT 26 06/02/2021    INR 1.02 06/02/2021     POC:   Lab Results   Component Value Date    BGM 89 06/10/2021     HEPATIC:   Lab Results   Component Value Date    ALBUMIN 3.4 07/26/2021    PROTTOTAL 7.6 07/26/2021    ALT 20 07/26/2021    AST 16 07/26/2021    ALKPHOS 58 07/26/2021    BILITOTAL 0.5 07/26/2021     OTHER:   Lab Results   Component Value Date    STEPHANIE 8.8 08/18/2021    PHOS 4.1 08/10/2021    MAG 1.8 08/11/2021         BP (!) 144/95 (BP Location: Right arm, Patient Position: Chair, Cuff Size: Adult Regular)   Pulse 56   Temp 97.7  F (36.5  C) (Oral)   Resp 16   Ht 1.753 m (5' 9\")   Wt 69.8 kg (153 lb 12.8 oz)   LMP 01/01/2009   SpO2 100%   Breastfeeding No   BMI 22.71 kg/m           Physical Exam  Constitutional: Awake, alert, cooperative, no apparent distress, and appears stated age.  Eyes: Pupils equal, " round and reactive to light, extra ocular muscles intact, sclera clear, conjunctiva normal.  HENT: Normocephalic, oral pharynx with moist mucus membranes, good dentition. No goiter appreciated.   Respiratory: Clear to auscultation bilaterally, no crackles or wheezing.  Cardiovascular: Regular rate and rhythm, normal S1 and S2, and no murmur noted.  Carotids +2, no bruits. No edema. Palpable pulses to radial  DP and PT arteries.   GI: Normal bowel sounds, soft, non-distended, non-tender, no masses palpated, no hepatosplenomegaly.  Surgical scars: healed, small umbilical hernia  Lymph/Hematologic: No cervical lymphadenopathy and no supraclavicular lymphadenopathy.  Genitourinary:  defer  Skin: Warm and dry.  No rashes at anticipated surgical site.   Musculoskeletal: Full ROM of neck. There is no redness, warmth, or swelling of the joints. Gross motor strength is normal.    Neurologic: Awake, alert, oriented to name, place and time. Cranial nerves II-XII are grossly intact. Gait is normal.   Neuropsychiatric: Calm, cooperative. Normal affect.     PRIOR LABS/DIAGNOSTIC STUDIES:   All labs and imaging personally reviewed     EKG/ stress test - if available please see in ROS above       The patient's records and results personally reviewed by this provider.     Outside records reviewed from: care everywhere    LAB/DIAGNOSTIC STUDIES TODAY:    Results for SCOTT MATUTE (MRN [id number]) as of 10/13/2021 10:35   Ref. Range 10/13/2021 09:09   Potassium Latest Ref Range: 3.4 - 5.3 mmol/L 4.3   Creatinine Latest Ref Range: 0.52 - 1.04 mg/dL 2.75 (H)   GFR Estimate Latest Ref Range: >60 mL/min/1.73m2 18 (L)   Hemoglobin Latest Ref Range: 11.7 - 15.7 g/dL 11.4 (L)           ASSESSMENT and PLAN  Scott is a 61 year old woman who is scheduled for HERNIORRHAPHY, VENTRAL, OPEN, EXAM UNDER ANESTHESIA, WITH SUTURE OR STAPLE REMOVAL on 11/11/21 by Dr. Del Rio in treatment of Ventral hernia without obstruction or gangrene.  PAC referral  for risk assessment and optimization for anesthesia with comorbid conditions of history of ulcerative pancolitis, hypertension, multiple DVTs, portal vein thrombosis, SMV thrombosis, absent IVC, lupus anticoagulant, anemia, history of transfusion, history of thyrotoxicosis, GERD, Matson's esophagus,  status post fundoplication, chronic kidney disease stage IV, PCOS, chronic pain, fibromyalgia, degenerative joint disease, anxiety and depression:     Pre-operative considerations:   1.  Cardiac:  Functional status- METS >4.  High risk surgery with 6.6% (RCRI #) risk of major adverse cardiac event.    ~ HTN - the patient will continue coreg.  She had EKG in 7/26/20 with sinus bradycardia. She has a METS of >4 as a competitive weight . No further testing indicated     2.  Pulm:  Airway feasible.  NICKI risk: Intermediate (age, HTN, snoring)      3. Heme:   Multiple DVTs, portal vein thrombosis, SMV thrombosis - found to have lupus anticoagulant. She is followed by an outside oncologist at [facility]. She remains on ASA 325mg. I spoke with her hematologist/oncologist Dr. Palma and given her recent GFR (18) he is not sure if she should even remain on ASA at this time. We have instructed the patient to hold  mg for 7 days prior if she does continue. He would also recommend that she complete post op lovenox once she is safe from a bleeding risk standpoint as she has done in the past given her high VTE risk and will reach out directly to the patient to discuss. I have sent a message to Dr. Del Rio and team updating them on the recommendation.   ~ Anemia - hgb was 10.6 on 8/18/21. She has had a transfusion in the past without issues.      4. Hair - the patient takes dutasteride and proscar for hair loss.      5. Endo: Remote history of thyrotoxicosis from herbal supplement. No current concerns.      6. GI:  Risk of PONV score = 4.  If > 2, anti-emetic intervention recommended. Patient reports no issues with  PONV after last procedure.   ~ UC - status post total abdominal colectomy in 2019, completion proctectomy and J-pouch with diverting loop ileostomy in June 2021 and ileostomy closure on 8/10/2021. She will continue imodium.   ~ GERD/ Barretts esophagus/ s/p fundoplication - not currently on medications and denies symptoms.      7. :  CKD IV - baseline creatinine appears to be 2.5-2.8. She is followed by an outside nephrologist at [place]. Consideration for close monitoring of fluids and avoid nephrotoxins. She will have recheck of labs today.   ~ PCOS - s/p oophorectomy     8. Psych: anxiety, depression - continue Lexapro, nortriptyline and buspar.       9. Musculoskeletal:  fibromyoaglia, chronic pain, neck pain, DJD - the patient sleeps on her side or finds a pillow under her heck is helpful. Consideration for careful positioning to minimize discomfort.      10. Anesthesia: The patient reports she is a difficult IV access. She has torus in her mouth and her lower left one became irritated and infected after a previous intubation. No issues after last one. She was treated with antibiotics by her dentist and has no current concerns. The patient reports since her last surgery in 8/2021 she has had more brain fog with not remembering things she used to. We did discussed that with her previous surgeries with general anesthesia in June and August 2021 and now with another one in November that she may have worsening brain fog. We discussed that this should improve with time but the patient does have a follow up appointment with her PCP for this.      VTE risk: 3%        Patient was discussed with Dr Cardenas.       The patient is optimized and acceptable candidate for proposed procedure. Arrival time, NPO, shower and medication instructions provided by nursing staff today.      On the day of service:     Prep time: 8 minutes  Visit time: 20 minutes  Documentation time: 31  minutes  ------------------------------------------  Total time: 59 minutes      Patricia Coy PA-C  Preoperative Assessment Center  [facility]  Phone: [phone]  Fax: [phone]

## 2021-10-13 NOTE — ANESTHESIA PREPROCEDURE EVALUATION
Anesthesia Pre-Procedure Evaluation    Patient: Radha Jameson   MRN: [id number] : 1960        Preoperative Diagnosis: * No surgery found *    Procedure :   PAC EVALUATION       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     Ulcerative pancolitis with complication (H)       Past Surgical History:   Procedure Laterality Date     BILATERAL OOPHORECTOMY  2009     BREAST SURGERY  2003    reconstruction      SECTION       COLECTOMY TOTAL       COLONOSCOPY       CV FEMORAL ANGIOGRAM       DAVINCI COLECTOMY N/A 6/10/2021    Procedure: ROBOT-ASSISTED  ileal pouch-anal anastomosis, diverting loop ileostomy;  Surgeon: Uriel Hung MD;  Location: [facility] OR     EGD      , 2017     GASTRIC FUNDOPLICATION  2017     HIATAL HERNIA REPAIR  2017    LINX     INSERT STENT URETER N/A 6/10/2021    Procedure: INSERTION, STENT, URETER, PREOPERATIVE;  Surgeon: Joaquin Dixon MD;  Location: [facility] OR     OVARIAN CYST REMOVAL       PELVIC LAPAROSCOPY       Removal of LINX       RLE Venogram/thrombolysis Right 05/15/2020     SIGMOIDOSCOPY FLEXIBLE N/A 6/10/2021    Procedure: SIGMOIDOSCOPY, FLEXIBLE;  Surgeon: Uriel Hung MD;  Location: [facility] OR     TAKEDOWN ILEOSTOMY N/A 8/10/2021    Procedure: CLOSURE, ILEOSTOMY;  Surgeon: Uriel Hung MD;  Location: [facility]      Allergies   Allergen Reactions     Warfarin Other (See Comments)     Internal bleeding reaction.     Wasp Venom Protein Angioedema and Other (See Comments)     Morphine Itching     Itching in  throat per pt     Amlodipine Other (See Comments)     Edema        Social History     Tobacco Use     Smoking status: Never Smoker     Smokeless tobacco: Never Used   Substance Use Topics     Alcohol use: Yes      Wt Readings from Last 1 Encounters:   10/05/21 69.5 kg (153 lb 4.8 oz)        Anesthesia Evaluation   Pt has had prior anesthetic. Type: General and MAC.    History of anesthetic complications  - PONV.  Two episodes of hypotension during surgery.    ROS/MED HX  ENT/Pulmonary:     (+) NICKI risk factors, snores loudly, hypertension,  (-) tobacco use   Neurologic:  - neg neurologic ROS  (-) no seizures, no CVA and no TIA   Cardiovascular:     (+) hypertension-----Taking blood thinners Previous cardiac testing   Echo: Date: Results:    Stress Test: Date: Results:    ECG Reviewed: Date: 7/26/20 Results:  SB  Cath: Date: Results:      METS/Exercise Tolerance: >4 METS    Hematologic: Comments: Lupus anticoagulant     (+) History of blood clots, pt is not anticoagulated, anemia, history of blood transfusion, no previous transfusion reaction, Known PRBC Anitbodies:No     Musculoskeletal: Comment: Firbromyalgia, neck pain, DJD      GI/Hepatic: Comment: History of Matson's  Denies GERD symptoms  S/p Fundoplication     S/p TAC, s/p completion proctectomy with J pouch and DLE, s/p takedown of DLE.     (+) Inflammatory bowel disease,  (-) GERD   Renal/Genitourinary: Comment: PCOS - s/p bilateral oophorectomy     (+) renal disease, type: CRI, Pt does not require dialysis,     Endo:     (+) thyroid problem,  Thyroid disease - Other history of thyrotoxicosis due to herbal supplement,     Psychiatric/Substance Use:     (+) psychiatric history anxiety and depression     Infectious Disease:  - neg infectious disease ROS     Malignancy:  - neg malignancy ROS     Other:  - neg other ROS    (+) , H/O Chronic Pain,        Physical Exam    Airway      Comment: Small mouth    Mallampati: II   TM distance: > 3 FB   Neck ROM: full    Mouth opening: > 3 cm    Respiratory Devices and Support         Dental  no notable dental history         Cardiovascular   cardiovascular exam normal          Pulmonary   pulmonary exam normal                OUTSIDE LABS:  CBC:   Lab Results   Component Value Date    WBC 7.2 08/18/2021    WBC 6.2 07/26/2021    HGB 10.6 (L) 08/18/2021    HGB 12.0 07/26/2021    HCT 32.8 (L) 08/18/2021    HCT 38.3 07/26/2021     08/18/2021     08/11/2021     BMP:   Lab Results   Component Value Date     08/18/2021     08/11/2021    POTASSIUM 4.5 08/18/2021    POTASSIUM 4.8 08/11/2021    CHLORIDE 108 08/18/2021    CHLORIDE 107 08/11/2021    CO2 31 08/18/2021    CO2 21 08/11/2021    BUN 31 (H) 08/18/2021    BUN 35 (H) 08/11/2021    CR 2.62 (H) 08/18/2021    CR 3.10 (H) 08/11/2021    GLC 78 08/18/2021     (H) 08/11/2021     COAGS:   Lab Results   Component Value Date    PTT 26 06/02/2021    INR 1.02 06/02/2021     POC:   Lab Results   Component Value Date    BGM 89 06/10/2021     HEPATIC:   Lab Results   Component Value Date    ALBUMIN 3.4 07/26/2021    PROTTOTAL 7.6 07/26/2021    ALT 20 07/26/2021    AST 16 07/26/2021    ALKPHOS 58 07/26/2021    BILITOTAL 0.5 07/26/2021     OTHER:   Lab Results   Component Value Date    STEPHANIE 8.8 08/18/2021    PHOS 4.1 08/10/2021    MAG 1.8 08/11/2021             PAC Discussion and Assessment    ASA Classification: 3  Case is suitable for: [place]  Anesthetic techniques and relevant risks discussed: GA with regional block for post-op pain control                  PAC Resident/NP Anesthesia Assessment: Radha is a 61 year old woman who is scheduled for HERNIORRHAPHY, VENTRAL, OPEN, EXAM UNDER ANESTHESIA, WITH SUTURE OR STAPLE REMOVAL on 11/11/21 by Dr. Del Rio in treatment of Ventral hernia without obstruction or gangrene.  PAC referral for risk assessment and optimization for anesthesia with comorbid conditions of history of ulcerative pancolitis, hypertension, multiple  DVTs, portal vein thrombosis, SMV thrombosis, absent IVC, lupus anticoagulant, anemia, history of transfusion, history of thyrotoxicosis, GERD, Matson's esophagus,  status post fundoplication, chronic kidney disease stage IV, PCOS, chronic pain, fibromyalgia, degenerative joint disease, anxiety and depression:     Pre-operative considerations:   1.  Cardiac:  Functional status- METS >4.  High risk surgery with 6.6% (RCRI #) risk of major adverse cardiac event.    ~ HTN - the patient will continue coreg.  She had EKG in 7/26/20 with sinus bradycardia. She has a METS of >4 as a competitive weight . No further testing indicated     2.  Pulm:  Airway feasible.  NICKI risk: Intermediate (age, HTN, snoring)      3. Heme:   Multiple DVTs, portal vein thrombosis, SMV thrombosis - found to have lupus anticoagulant. She is followed by an outside oncologist at [facility]. She remains on ASA 325mg. I spoke with her hematologist/oncologist Dr. Palma and given her recent GFR (18) he is not sure if she should even remain on ASA at this time. We have instructed the patient to hold  mg for 7 days prior if she does continue. He would also recommend that she complete post op lovenox once she is safe from a bleeding risk standpoint as she has done in the past given her high VTE risk and will reach out directly to the patient to discuss. I have sent a message to Dr. Del Rio and team updating them on the recommendation.   ~ Anemia - hgb was 10.6 on 8/18/21. She has had a transfusion in the past without issues.      4. Hair - the patient takes dutasteride and proscar for hair loss.      5. Endo: Remote history of thyrotoxicosis from herbal supplement. No current concerns.      6. GI:  Risk of PONV score = 4.  If > 2, anti-emetic intervention recommended. Patient reports no issues with PONV after last procedure.   ~ UC - status post total abdominal colectomy in 2019, completion proctectomy and J-pouch with diverting loop  ileostomy in June 2021 and ileostomy closure on 8/10/2021. She will continue imodium.   ~ GERD/ Barretts esophagus/ s/p fundoplication - not currently on medications and denies symptoms.      7. :  CKD IV - baseline creatinine appears to be 2.5-2.8. She is followed by an outside nephrologist at [place]. Consideration for close monitoring of fluids and avoid nephrotoxins. She will have recheck of labs today.   ~ PCOS - s/p oophorectomy     8. Psych: anxiety, depression - continue Lexapro, nortriptyline and buspar.       9. Musculoskeletal:  fibromyoaglia, chronic pain, neck pain, DJD - the patient sleeps on her side or finds a pillow under her heck is helpful. Consideration for careful positioning to minimize discomfort.      10. Anesthesia: The patient reports she is a difficult IV access. She has torus in her mouth and her lower left one became irritated and infected after a previous intubation. No issues after last one. She was treated with antibiotics by her dentist and has no current concerns. The patient reports since her last surgery in 8/2021 she has had more brain fog with not remembering things she used to. We did discussed that with her previous surgeries with general anesthesia in June and August 2021 and now with another one in November that she may have worsening brain fog. We discussed that this should improve with time but the patient does have a follow up appointment with her PCP for this.      VTE risk: 3%    Patient is optimized and is acceptable candidate for the proposed procedure.  No further diagnostic evaluation is needed.    Patient discussed with Dr. Cardenas  For further details of assessment, testing, and physical exam please see H and P completed on same date   Patricia Coy PA-C         Mid-Level Provider/Resident: Patricia Coy PA-C  Date: 10/13/21                                 Patricia Coy PA-C

## 2021-10-13 NOTE — H&P
Pre-Operative H & P     CC:  Preoperative exam to assess for increased cardiopulmonary risk while undergoing surgery and anesthesia.    Date of Encounter: 10/13/2021  Primary Care Physician:  Hair Rivera     Reason for visit:   Encounter Diagnoses   Name Primary?     Pre-op examination Yes     Ventral hernia without obstruction or gangrene        CARMEN Jameson is a 61 year old female who presents for pre-operative H & P in preparation for HERNIORRHAPHY, VENTRAL, OPEN, EXAM UNDER ANESTHESIA, WITH SUTURE OR STAPLE REMOVAL with Dr. Del Rio on 11/11/21 at [facility] [place].     The patient is a 61-year-old woman with a past medical history significant for ulcerative pancolitis refractory to medication status post total abdominal colectomy in 2019, completion proctectomy and J-pouch with diverting loop ileostomy in June 2021 and ileostomy closure on 8/10/2021.  She has a past medical history also significant for hypertension, multiple DVTs, portal vein thrombosis, SMV thrombosis, absent IVC, lupus anticoagulant, anemia, history of transfusion, history of thyrotoxicosis, GERD, Matson's esophagus,  status post fundoplication, chronic kidney disease stage IV, PCOS, chronic pain, fibromyalgia, degenerative joint disease, anxiety and depression.  She met with Dr. Del Rio on 10/5/2021 for ongoing concerns with lower mid abdominal mass.  On exam she was found to have a lower midline fascial defect and a suture under the skin at the previous ostomy site.  They discussed her treatment options and the patient has been scheduled for the procedure as above.    History is obtained from the patient and chart review      Hx of abnormal bleeding or anti-platelet use:  mg    Menstrual history: Patient's last menstrual period was 01/01/2009.:     Prior to Admission Medications  Current Outpatient Medications   Medication Sig Dispense Refill     acetaminophen (TYLENOL) 500 MG tablet Take 2 tablets  (1,000 mg) by mouth every 6 hours As scheduled for the next 5-7 days, then decrease both dose & frequency to as needed there after. 60 tablet 0     aspirin (ASA) 325 MG EC tablet Take 325 mg by mouth every evening        augmented betamethasone dipropionate (DIPROLENE) 0.05 % external lotion 2 times daily        BIOTIN PO Take 1 tablet by mouth 2 times daily Unknown strength       busPIRone (BUSPAR) 5 MG tablet 10 mg 2 times daily        carvedilol (COREG) 25 MG tablet Take 25 mg by mouth 2 times daily (with meals)        dutasteride (AVODART) 0.5 MG capsule Take 0.5 mg by mouth 2 times daily        EPINEPHrine (ANY BX GENERIC EQUIV) 0.3 MG/0.3ML injection 2-pack once as needed for anaphylaxis        escitalopram (LEXAPRO) 20 MG tablet Take 20 mg by mouth every morning       finasteride (PROSCAR) 5 MG tablet Take 5 mg by mouth every morning        ketoconazole (NIZORAL) 2 % external shampoo daily as needed        loperamide (IMODIUM A-D) 2 MG tablet Take 1 tablet before bedtime. Can increase to 2 tablets before bedtime (Patient taking differently: 2 times daily as needed Take 1 tablet before bedtime. Can increase to 2 tablets before bedtime) 60 tablet 3     nortriptyline (PAMELOR) 25 MG capsule Take 25 mg by mouth At Bedtime        cholecalciferol 25 MCG (1000 UT) TABS Take 1,000 Units by mouth daily (Patient not taking: Reported on 10/13/2021)       loperamide (IMODIUM A-D) 2 MG tablet Take 2 tablets (4 mg) by mouth 3 times daily as needed for diarrhea (Patient not taking: Reported on 10/13/2021) 60 tablet 5     oxyCODONE (ROXICODONE) 5 MG tablet Take 1 tablet (5 mg) by mouth every 6 hours as needed for moderate to severe pain (Patient not taking: Reported on 10/4/2021) 18 tablet 0       Family History  Family History   Problem Relation Age of Onset     Matson's esophagus Father      Diabetes Mother      Hypertension Mother      Multiple myeloma Mother      Ulcerative Colitis Mother      Anesthesia Reaction Mother          post op delirium     Cervical Cancer Sister      Diabetes Type 2  Brother      Asthma Maternal Grandmother      Breast Cancer Maternal Grandmother      Breast Cancer Paternal Grandmother      Clotting Disorder No family hx of      Bleeding Disorder No family hx of        The complete review of systems is negative other than noted in the HPI or here.     Preprocedure Note     Last edited 10/13/21 1033 by Patricia Coy PA-C  Date of Service 10/13/21 0820  Creation Time 10/13/21 0820  Status: Addendum             Anesthesia Pre-Procedure Evaluation    Patient: Radha Jameson   MRN: [id number] : 1960        Preoperative Diagnosis: * No surgery found *    Procedure :   PAC EVALUATION       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     Ulcerative pancolitis with complication (H)       Past Surgical History:   Procedure Laterality Date     BILATERAL OOPHORECTOMY  2009     BREAST SURGERY  2003    reconstruction      SECTION       COLECTOMY TOTAL  2019     COLONOSCOPY       CV FEMORAL ANGIOGRAM       DAVINCI COLECTOMY N/A 6/10/2021    Procedure: ROBOT-ASSISTED  ileal pouch-anal anastomosis, diverting loop ileostomy;  Surgeon: Uriel Hung MD;  Location: [facility] OR     EGD      , 2017     GASTRIC FUNDOPLICATION  2017     HIATAL HERNIA REPAIR  2017    LINX     INSERT STENT URETER N/A 6/10/2021    Procedure: INSERTION, STENT, URETER, PREOPERATIVE;  Surgeon: Joaquin Dixon MD;  Location: [facility] OR     OVARIAN CYST REMOVAL       PELVIC LAPAROSCOPY        Removal of LINX       RLE Venogram/thrombolysis Right 05/15/2020     SIGMOIDOSCOPY FLEXIBLE N/A 6/10/2021    Procedure: SIGMOIDOSCOPY, FLEXIBLE;  Surgeon: Uriel Hung MD;  Location: [facility] OR     TAKEDOWN ILEOSTOMY N/A 8/10/2021    Procedure: CLOSURE, ILEOSTOMY;  Surgeon: Uriel Hung MD;  Location: [facility] OR      Allergies   Allergen Reactions     Warfarin Other (See Comments)     Internal bleeding reaction.     Wasp Venom Protein Angioedema and Other (See Comments)     Morphine Itching     Itching in throat per pt     Amlodipine Other (See Comments)     Edema        Social History     Tobacco Use     Smoking status: Never Smoker     Smokeless tobacco: Never Used   Substance Use Topics     Alcohol use: Yes      Wt Readings from Last 1 Encounters:   10/05/21 69.5 kg (153 lb 4.8 oz)        Anesthesia Evaluation   Pt has had prior anesthetic. Type: General and MAC.    History of anesthetic complications  - PONV.  Two episodes of hypotension during surgery.    ROS/MED HX  ENT/Pulmonary:     (+) NICKI risk factors, snores loudly, hypertension,  (-) tobacco use   Neurologic:  - neg neurologic ROS  (-) no seizures, no CVA and no TIA   Cardiovascular:     (+) hypertension-----Taking blood thinners Previous cardiac testing   Echo: Date: Results:    Stress Test: Date: Results:    ECG Reviewed: Date: 7/26/20 Results:  SB  Cath: Date: Results:      METS/Exercise Tolerance: >4 METS    Hematologic: Comments: Lupus anticoagulant     (+) History of blood clots, pt is not anticoagulated, anemia, history of blood transfusion, no previous transfusion reaction, Known PRBC Anitbodies:No     Musculoskeletal: Comment: Firbromyalgia, neck pain, DJD      GI/Hepatic: Comment: History of Matson's  Denies GERD symptoms  S/p Fundoplication     S/p TAC, s/p completion proctectomy with J pouch and DLE, s/p takedown of DLE.     (+) Inflammatory bowel disease,  (-) GERD   Renal/Genitourinary: Comment: PCOS - s/p bilateral  "oophorectomy     (+) renal disease, type: CRI, Pt does not require dialysis,     Endo:     (+) thyroid problem,  Thyroid disease - Other history of thyrotoxicosis due to herbal supplement,     Psychiatric/Substance Use:     (+) psychiatric history anxiety and depression     Infectious Disease:  - neg infectious disease ROS     Malignancy:  - neg malignancy ROS     Other:  - neg other ROS    (+) , H/O Chronic Pain,          OUTSIDE LABS:  CBC:   Lab Results   Component Value Date    WBC 7.2 08/18/2021    WBC 6.2 07/26/2021    HGB 10.6 (L) 08/18/2021    HGB 12.0 07/26/2021    HCT 32.8 (L) 08/18/2021    HCT 38.3 07/26/2021     08/18/2021     08/11/2021     BMP:   Lab Results   Component Value Date     08/18/2021     08/11/2021    POTASSIUM 4.5 08/18/2021    POTASSIUM 4.8 08/11/2021    CHLORIDE 108 08/18/2021    CHLORIDE 107 08/11/2021    CO2 31 08/18/2021    CO2 21 08/11/2021    BUN 31 (H) 08/18/2021    BUN 35 (H) 08/11/2021    CR 2.62 (H) 08/18/2021    CR 3.10 (H) 08/11/2021    GLC 78 08/18/2021     (H) 08/11/2021     COAGS:   Lab Results   Component Value Date    PTT 26 06/02/2021    INR 1.02 06/02/2021     POC:   Lab Results   Component Value Date    BGM 89 06/10/2021     HEPATIC:   Lab Results   Component Value Date    ALBUMIN 3.4 07/26/2021    PROTTOTAL 7.6 07/26/2021    ALT 20 07/26/2021    AST 16 07/26/2021    ALKPHOS 58 07/26/2021    BILITOTAL 0.5 07/26/2021     OTHER:   Lab Results   Component Value Date    STEPHANIE 8.8 08/18/2021    PHOS 4.1 08/10/2021    MAG 1.8 08/11/2021         BP (!) 144/95 (BP Location: Right arm, Patient Position: Chair, Cuff Size: Adult Regular)   Pulse 56   Temp 97.7  F (36.5  C) (Oral)   Resp 16   Ht 1.753 m (5' 9\")   Wt 69.8 kg (153 lb 12.8 oz)   LMP 01/01/2009   SpO2 100%   Breastfeeding No   BMI 22.71 kg/m           Physical Exam  Constitutional: Awake, alert, cooperative, no apparent distress, and appears stated age.  Eyes: Pupils equal, " round and reactive to light, extra ocular muscles intact, sclera clear, conjunctiva normal.  HENT: Normocephalic, oral pharynx with moist mucus membranes, good dentition. No goiter appreciated.   Respiratory: Clear to auscultation bilaterally, no crackles or wheezing.  Cardiovascular: Regular rate and rhythm, normal S1 and S2, and no murmur noted.  Carotids +2, no bruits. No edema. Palpable pulses to radial  DP and PT arteries.   GI: Normal bowel sounds, soft, non-distended, non-tender, no masses palpated, no hepatosplenomegaly.  Surgical scars: healed, small umbilical hernia  Lymph/Hematologic: No cervical lymphadenopathy and no supraclavicular lymphadenopathy.  Genitourinary:  defer  Skin: Warm and dry.  No rashes at anticipated surgical site.   Musculoskeletal: Full ROM of neck. There is no redness, warmth, or swelling of the joints. Gross motor strength is normal.    Neurologic: Awake, alert, oriented to name, place and time. Cranial nerves II-XII are grossly intact. Gait is normal.   Neuropsychiatric: Calm, cooperative. Normal affect.     PRIOR LABS/DIAGNOSTIC STUDIES:   All labs and imaging personally reviewed     EKG/ stress test - if available please see in ROS above       The patient's records and results personally reviewed by this provider.     Outside records reviewed from: care everywhere    LAB/DIAGNOSTIC STUDIES TODAY:    Results for SCOTT MATUTE (MRN [id number]) as of 10/13/2021 10:35   Ref. Range 10/13/2021 09:09   Potassium Latest Ref Range: 3.4 - 5.3 mmol/L 4.3   Creatinine Latest Ref Range: 0.52 - 1.04 mg/dL 2.75 (H)   GFR Estimate Latest Ref Range: >60 mL/min/1.73m2 18 (L)   Hemoglobin Latest Ref Range: 11.7 - 15.7 g/dL 11.4 (L)           ASSESSMENT and PLAN  Scott is a 61 year old woman who is scheduled for HERNIORRHAPHY, VENTRAL, OPEN, EXAM UNDER ANESTHESIA, WITH SUTURE OR STAPLE REMOVAL on 11/11/21 by Dr. Del Rio in treatment of Ventral hernia without obstruction or gangrene.  PAC referral  for risk assessment and optimization for anesthesia with comorbid conditions of history of ulcerative pancolitis, hypertension, multiple DVTs, portal vein thrombosis, SMV thrombosis, absent IVC, lupus anticoagulant, anemia, history of transfusion, history of thyrotoxicosis, GERD, Matson's esophagus,  status post fundoplication, chronic kidney disease stage IV, PCOS, chronic pain, fibromyalgia, degenerative joint disease, anxiety and depression:     Pre-operative considerations:   1.  Cardiac:  Functional status- METS >4.  High risk surgery with 6.6% (RCRI #) risk of major adverse cardiac event.    ~ HTN - the patient will continue coreg.  She had EKG in 7/26/20 with sinus bradycardia. She has a METS of >4 as a competitive weight . No further testing indicated     2.  Pulm:  Airway feasible.  NICKI risk: Intermediate (age, HTN, snoring)      3. Heme:   Multiple DVTs, portal vein thrombosis, SMV thrombosis - found to have lupus anticoagulant. She is followed by an outside oncologist at [facility]. She remains on ASA 325mg. I spoke with her hematologist/oncologist Dr. Palma and given her recent GFR (18) he is not sure if she should even remain on ASA at this time. We have instructed the patient to hold  mg for 7 days prior if she does continue. He would also recommend that she complete post op lovenox once she is safe from a bleeding risk standpoint as she has done in the past given her high VTE risk and will reach out directly to the patient to discuss. I have sent a message to Dr. Del Rio and team updating them on the recommendation.   ~ Anemia - hgb was 10.6 on 8/18/21. She has had a transfusion in the past without issues.      4. Hair - the patient takes dutasteride and proscar for hair loss.      5. Endo: Remote history of thyrotoxicosis from herbal supplement. No current concerns.      6. GI:  Risk of PONV score = 4.  If > 2, anti-emetic intervention recommended. Patient reports no issues with  PONV after last procedure.   ~ UC - status post total abdominal colectomy in 2019, completion proctectomy and J-pouch with diverting loop ileostomy in June 2021 and ileostomy closure on 8/10/2021. She will continue imodium.   ~ GERD/ Barretts esophagus/ s/p fundoplication - not currently on medications and denies symptoms.      7. :  CKD IV - baseline creatinine appears to be 2.5-2.8. She is followed by an outside nephrologist at [place]. Consideration for close monitoring of fluids and avoid nephrotoxins. She will have recheck of labs today.   ~ PCOS - s/p oophorectomy     8. Psych: anxiety, depression - continue Lexapro, nortriptyline and buspar.       9. Musculoskeletal:  fibromyoaglia, chronic pain, neck pain, DJD - the patient sleeps on her side or finds a pillow under her heck is helpful. Consideration for careful positioning to minimize discomfort.      10. Anesthesia: The patient reports she is a difficult IV access. She has torus in her mouth and her lower left one became irritated and infected after a previous intubation. No issues after last one. She was treated with antibiotics by her dentist and has no current concerns. The patient reports since her last surgery in 8/2021 she has had more brain fog with not remembering things she used to. We did discussed that with her previous surgeries with general anesthesia in June and August 2021 and now with another one in November that she may have worsening brain fog. We discussed that this should improve with time but the patient does have a follow up appointment with her PCP for this.      VTE risk: 3%        Patient was discussed with Dr Cardenas.       The patient is optimized and acceptable candidate for proposed procedure. Arrival time, NPO, shower and medication instructions provided by nursing staff today.      On the day of service:     Prep time: 8 minutes  Visit time: 20 minutes  Documentation time: 31  minutes  ------------------------------------------  Total time: 59 minutes      Patricia Coy PA-C  Preoperative Assessment Center  [facility]  Clinic and Surgery Center  Phone: [phone]  Fax: [phone]

## 2021-10-13 NOTE — PATIENT INSTRUCTIONS
Preparing for Your Surgery      Name:  Radha Jameson   MRN:  [id number]   :  1960   Today's Date:  10/13/2021         Arriving for surgery:  Surgery date:  21  Arrival time:  08:30 a.m.    Restrictions due to COVID 19:  One consistent visitor is allowed per patient  No ill visitors  All visitors must wear face mask     parking is available for anyone with mobility limitations or disabilities. (Monday- Friday 7 am- 5 pm)    Please come to:    [facility]  [address] [place] [phone]    Please check in on the 5th floor at the Ambulatory Surgery Center       What can I eat or drink?    -  You may eat and drink normally until 8 hours before surgery. (Until midnight the night before your surgery)  -  You may have clear liquids up to 4 hours before surgery. (Until 06:00 a.m.)  Examples of clear liquids:  Water  Clear broth  Juices (apple, white grape, white cranberry  and cider) without pulp  Noncarbonated, powder based beverages  (lemonade and Stevo-Aid)  Sodas (Sprite, 7-Up, ginger ale and seltzer)  Coffee or tea (without milk or cream)  Gatorade    --No alcohol for at least 24 hours before surgery    Which medicines can I take?    Hold Aspirin for 7 days before surgery.   Hold Multivitamins for 7 days before surgery.  Hold Supplements for 7 days before surgery.  Hold Ibuprofen (Advil, Motrin) for 1 day before surgery--unless otherwise directed by surgeon.  Hold Naproxen (Aleve) for 4 days before surgery.    -  PLEASE TAKE the following medications the day of surgery   Tylenol if needed,  Buspirone (Buspar),  Carvedilol (Coreg),   Dutasteride (Avodart),  Escitalopram (Lexapro)    How do I prepare myself?  - Please take 2 showers before surgery using Scrubcare or Hibiclens soap.    Use this soap only from the neck to your toes.     Leave the soap on your skin for one minute--then rinse thoroughly.      You may use your own shampoo and conditioner; no other hair  products.   - Please remove all jewelry and body piercings.  - No lotions, deodorants or fragrance.  - No makeup or fingernail polish.   - Bring your ID and insurance card.    -If you have a Deep Brain Stimulator, a Spinal Cord Stimulator or any implanted Neuro Device you must bring the remote to the Surgery Center         - All patients are required to have a Covid-19 test within 4 days of surgery/procedure.      -Patients will be contacted by the [facility] scheduling team within 1 week of surgery to make an appointment.      - Patients may call the Scheduling team at [phone] if they have not been scheduled within 4 days of  surgery.      ALL PATIENTS ARE REQUIRED TO HAVE A RESPONSIBLE ADULT TO DRIVE AND BE IN ATTENDANCE WITH THEM FOR 24 HOURS FOLLOWING SURGERY       Questions or Concerns:    -For questions regarding the day of surgery please contact the Ambulatory Surgery Center at [phone].    -If you have health changes between today and your surgery please contact your surgeon.     For questions after surgery please call your surgeons office.

## 2021-10-14 ENCOUNTER — PATIENT OUTREACH (OUTPATIENT)
Dept: SURGERY | Facility: CLINIC | Age: 61
End: 2021-10-14

## 2021-10-14 DIAGNOSIS — E86.0 DEHYDRATION: ICD-10-CM

## 2021-10-14 DIAGNOSIS — Z09 FOLLOW-UP EXAMINATION AFTER COLORECTAL SURGERY: Primary | ICD-10-CM

## 2021-10-14 LAB — VIT B12 SERPL-MCNC: 306 PG/ML (ref 193–986)

## 2021-10-14 NOTE — PROGRESS NOTES
Patient returning Chelle's phone call regarding Lovenox injections. Relayed message that patient is to begin Lovenox the day after surgery per Dr. Del Rio's recommendations. Dr. Palma, oncology, will be prescribing and giving directions regarding dosing and frequency. Advised to call her oncology clinic and ask for the specifics on this information. She verbalized understanding and denies further questions.

## 2021-10-14 NOTE — PROGRESS NOTES
Chart reviewed by Dr. Del Rio. Patient may start Lovenox day after procedure.  Attempted to reach patient with no answer.  Left instructions on VM.  Direct contact information provided.

## 2021-11-02 ENCOUNTER — TELEPHONE (OUTPATIENT)
Dept: GASTROENTEROLOGY | Facility: CLINIC | Age: 61
End: 2021-11-02

## 2021-11-02 DIAGNOSIS — Z11.59 ENCOUNTER FOR SCREENING FOR OTHER VIRAL DISEASES: ICD-10-CM

## 2021-11-02 NOTE — TELEPHONE ENCOUNTER
Screening Questions  1. Are you active on mychart? N    2. What insurance is in the chart? [employer]    2.  Ordering/Referring Provider: Abhilash    3. BMI 22.1    4. Do you have any Lung issues?  N If yes continue:   Do you use daily home oxygen? NA   Do you have Pulmonary Hypertension? NA   Do you have SEVERE asthma? NA    5. Have you had a heart, lung, or liver transplant? N    6. Are you currently on dialysis or have chronic kidney disease? Yes, CKD    7. Have you had a stroke or Transient ischemic attack (TIA) within 6 months? N    8. In the past 6 months, have you had any heart related issues including cardiomyopathy or heart attack? N      If yes, did it require cardiac stenting or other implantable device?N      9. Do you have any implantable devices in your body (pacemaker, defib, LVAD)? N    10. Do you take nitroglycerin? If yes, how often? N    11. Are you currently taking any blood thinners?N    12. Are you a diabetic? N    13. (Females) Are you currently pregnant? NA  If yes, how many weeks?      15. Are you taking any prescription pain medications on a routine schedule? N If yes, MAC sedation.    16. Do you have any chemical dependencies such as alcohol, street drugs, or methadone? NIf yes, MAC sedation.    17. Do you have any history of post-traumatic stress syndrome, severe anxiety or history of psychosis? Yes-meds for anxiety    18. Do you transfer independently? Yes    19.  Do you have any issues with constipation? N    20. Preferred Pharmacy for Pre Prescription [id number], [place] - [address] [place] [facility] [place] [address]    Scheduling Details    Which Colonoscopy Prep was Sent?: GoLytely  Procedure Scheduled: Colon/Pouchoscopy  Provider/Surgeon: Abhilash  Date of Procedure: 12/1/21  [facility]: [facility]  Caller (Please ask for phone number if not scheduled by patient): Radha      Sedation Type: MAC  Conscious Sedation- Needs  for 6 hours after the procedure  MAC/General-Needs   for 24 hours after procedure    Pre-op Required at [facility] [place] and OR for MAC sedation:   (if yes advise patient they will need a pre-op prior to procedure)      Is patient on blood thinners? -N (If yes- inform patient to follow up with PCP or provider for follow up instructions)     Informed patient they will need an adult  Yes  Cannot take any type of public or medical transportation alone    Pre-Procedure Covid test to be completed at [facility] or Externally: [place] 11/29/21    Confirmed Nurse will call to complete assessment Yes    Additional comments:

## 2021-11-03 ENCOUNTER — MEDICAL CORRESPONDENCE (OUTPATIENT)
Dept: HEALTH INFORMATION MANAGEMENT | Facility: CLINIC | Age: 61
End: 2021-11-03
Payer: COMMERCIAL

## 2021-11-04 ENCOUNTER — TELEPHONE (OUTPATIENT)
Dept: PEDIATRICS | Facility: CLINIC | Age: 61
End: 2021-11-04

## 2021-11-04 DIAGNOSIS — N18.4 CHRONIC KIDNEY DISEASE, STAGE IV (SEVERE) (H): Primary | ICD-10-CM

## 2021-11-04 NOTE — TELEPHONE ENCOUNTER
Needed to reschedule nephrology appointment for a virtual visit instead on 11/9 . Pt. Scheduled for preop covid test on the 8th at the [facility]. [facility] wondering if she can also get labs done when she is there.     Gave fax number for [facility] lab:[phone] to fax orders to. [facility] will let patient know to schedule lab visit as well for 11/8.    Leia Toscano RN

## 2021-11-04 NOTE — TELEPHONE ENCOUNTER
Noted - already has lab only for 11/8/2021 for covid - doesn't need another lab appt  Georgina REYES RN

## 2021-11-08 ENCOUNTER — LAB (OUTPATIENT)
Dept: LAB | Facility: CLINIC | Age: 61
End: 2021-11-08
Payer: COMMERCIAL

## 2021-11-08 ENCOUNTER — LAB (OUTPATIENT)
Dept: LAB | Facility: CLINIC | Age: 61
End: 2021-11-08
Attending: SURGERY

## 2021-11-08 DIAGNOSIS — N18.4 CHRONIC KIDNEY DISEASE, STAGE IV (SEVERE) (H): ICD-10-CM

## 2021-11-08 DIAGNOSIS — Z11.59 ENCOUNTER FOR SCREENING FOR OTHER VIRAL DISEASES: ICD-10-CM

## 2021-11-08 LAB
ALBUMIN SERPL-MCNC: 3.9 G/DL (ref 3.4–5)
ALBUMIN UR-MCNC: 30 MG/DL
ANION GAP SERPL CALCULATED.3IONS-SCNC: 6 MMOL/L (ref 3–14)
APPEARANCE UR: CLEAR
BILIRUB UR QL STRIP: NEGATIVE
BUN SERPL-MCNC: 49 MG/DL (ref 7–30)
CALCIUM SERPL-MCNC: 9.1 MG/DL (ref 8.5–10.1)
CHLORIDE BLD-SCNC: 109 MMOL/L (ref 94–109)
CO2 SERPL-SCNC: 25 MMOL/L (ref 20–32)
COLOR UR AUTO: YELLOW
CREAT SERPL-MCNC: 2.98 MG/DL (ref 0.52–1.04)
CREAT UR-MCNC: 159 MG/DL
CREAT UR-MCNC: 159 MG/DL
DEPRECATED CALCIDIOL+CALCIFEROL SERPL-MC: 53 UG/L (ref 20–75)
GFR SERPL CREATININE-BSD FRML MDRD: 16 ML/MIN/1.73M2
GLUCOSE BLD-MCNC: 89 MG/DL (ref 70–99)
GLUCOSE UR STRIP-MCNC: NEGATIVE MG/DL
HGB BLD-MCNC: 12.1 G/DL (ref 11.7–15.7)
HGB UR QL STRIP: NEGATIVE
HYALINE CASTS: 1 /LPF
KETONES UR STRIP-MCNC: NEGATIVE MG/DL
LEUKOCYTE ESTERASE UR QL STRIP: NEGATIVE
MICROALBUMIN UR-MCNC: 292 MG/L
MICROALBUMIN/CREAT UR: 183.65 MG/G CR (ref 0–25)
NITRATE UR QL: NEGATIVE
PH UR STRIP: 5 [PH] (ref 5–7)
PHOSPHATE SERPL-MCNC: 4.2 MG/DL (ref 2.5–4.5)
POTASSIUM BLD-SCNC: 5 MMOL/L (ref 3.4–5.3)
PROT UR-MCNC: 0.55 G/L
PROT/CREAT 24H UR: 0.35 G/G CR (ref 0–0.2)
PTH-INTACT SERPL-MCNC: 147 PG/ML (ref 18–80)
RBC URINE: <1 /HPF
SARS-COV-2 RNA RESP QL NAA+PROBE: NEGATIVE
SODIUM SERPL-SCNC: 140 MMOL/L (ref 133–144)
SP GR UR STRIP: 1.02 (ref 1–1.03)
SQUAMOUS EPITHELIAL: 1 /HPF
UROBILINOGEN UR STRIP-MCNC: NORMAL MG/DL
WBC URINE: 0 /HPF

## 2021-11-08 PROCEDURE — 82043 UR ALBUMIN QUANTITATIVE: CPT | Performed by: PATHOLOGY

## 2021-11-08 PROCEDURE — 82306 VITAMIN D 25 HYDROXY: CPT

## 2021-11-08 PROCEDURE — 80069 RENAL FUNCTION PANEL: CPT | Performed by: PATHOLOGY

## 2021-11-08 PROCEDURE — U0005 INFEC AGEN DETEC AMPLI PROBE: HCPCS | Performed by: SURGERY

## 2021-11-08 PROCEDURE — 36415 COLL VENOUS BLD VENIPUNCTURE: CPT | Performed by: PATHOLOGY

## 2021-11-08 PROCEDURE — 81001 URINALYSIS AUTO W/SCOPE: CPT | Performed by: PATHOLOGY

## 2021-11-08 PROCEDURE — 83970 ASSAY OF PARATHORMONE: CPT

## 2021-11-08 PROCEDURE — 84156 ASSAY OF PROTEIN URINE: CPT | Performed by: PATHOLOGY

## 2021-11-08 PROCEDURE — 85018 HEMOGLOBIN: CPT | Performed by: PATHOLOGY

## 2021-11-09 ENCOUNTER — TELEPHONE (OUTPATIENT)
Dept: GASTROENTEROLOGY | Facility: OUTPATIENT CENTER | Age: 61
End: 2021-11-09
Payer: COMMERCIAL

## 2021-11-09 NOTE — TELEPHONE ENCOUNTER
[facility] Call Center    Phone Message    May a detailed message be left on voicemail: yes     Reason for Call: Other: Pt is requesting a call back please, she states her kidney doctor is considering starting her on IV fluids. Pt is wanting to discuss with care team if this would be helpful. Please advise. Thank you!      Action Taken: Message routed to:  [facility] ([facility]): Colon and Rectal     Travel Screening: Not Applicable

## 2021-11-10 ENCOUNTER — ANESTHESIA EVENT (OUTPATIENT)
Dept: SURGERY | Facility: AMBULATORY SURGERY CENTER | Age: 61
End: 2021-11-10
Payer: COMMERCIAL

## 2021-11-10 ENCOUNTER — PATIENT OUTREACH (OUTPATIENT)
Dept: SURGERY | Facility: CLINIC | Age: 61
End: 2021-11-10
Payer: COMMERCIAL

## 2021-11-10 RX ORDER — NALOXONE HYDROCHLORIDE 0.4 MG/ML
0.4 INJECTION, SOLUTION INTRAMUSCULAR; INTRAVENOUS; SUBCUTANEOUS
Status: DISCONTINUED | OUTPATIENT
Start: 2021-11-10 | End: 2021-11-13 | Stop reason: HOSPADM

## 2021-11-10 RX ORDER — NALOXONE HYDROCHLORIDE 0.4 MG/ML
0.2 INJECTION, SOLUTION INTRAMUSCULAR; INTRAVENOUS; SUBCUTANEOUS
Status: DISCONTINUED | OUTPATIENT
Start: 2021-11-10 | End: 2021-11-13 | Stop reason: HOSPADM

## 2021-11-10 NOTE — PROGRESS NOTES
Radha called me today stating that her hematologist/oncologist (Dr. Palma) and kidney doctor () think that Radha needs fluids due to dehydration however both doctors are not willing to manage this per Radha. They think that colon/rectal surgery should manage this. She is having 7-8 soft stools a day. She is taking 6 imodium pills. Her electrolytes are normal. Her creat has been slowly increasing. Her last step to her pouch surgery was in August. I left a message with her nephrologist and oncologist to call me back as from colon/rectal standpoint patient is doing well. Forwarded message to  and Minerva Alford [facility]    Addendum: Dr. Germain called me back and stated she was looking for Radha's gastroenterologist. I stated that I don't believe she has one but Dr. Germain was going to look into this.

## 2021-11-11 ENCOUNTER — ANESTHESIA (OUTPATIENT)
Dept: SURGERY | Facility: AMBULATORY SURGERY CENTER | Age: 61
End: 2021-11-11
Payer: COMMERCIAL

## 2021-11-11 ENCOUNTER — HOSPITAL ENCOUNTER (OUTPATIENT)
Facility: AMBULATORY SURGERY CENTER | Age: 61
End: 2021-11-11
Attending: SURGERY
Payer: COMMERCIAL

## 2021-11-11 VITALS
HEART RATE: 74 BPM | RESPIRATION RATE: 14 BRPM | TEMPERATURE: 98 F | HEIGHT: 69 IN | DIASTOLIC BLOOD PRESSURE: 93 MMHG | BODY MASS INDEX: 21.18 KG/M2 | SYSTOLIC BLOOD PRESSURE: 134 MMHG | WEIGHT: 143 LBS | OXYGEN SATURATION: 96 %

## 2021-11-11 DIAGNOSIS — K43.9 VENTRAL HERNIA WITHOUT OBSTRUCTION OR GANGRENE: ICD-10-CM

## 2021-11-11 PROCEDURE — 49568 PR REPAIR HERNIA WITH MESH: CPT | Performed by: SURGERY

## 2021-11-11 PROCEDURE — 49560 PR REPAIR INCISIONAL HERNIA,REDUCIBLE: CPT | Performed by: SURGERY

## 2021-11-11 PROCEDURE — 49560 HC REPAIR INCISIONAL HERNIA,REDUCIBLE: CPT

## 2021-11-11 DEVICE — MESH POLYESTER PROGRIP 6X6" (15X15CM) PARIETEX TEM1515G: Type: IMPLANTABLE DEVICE | Site: ABDOMEN | Status: FUNCTIONAL

## 2021-11-11 RX ORDER — ONDANSETRON 2 MG/ML
4 INJECTION INTRAMUSCULAR; INTRAVENOUS EVERY 30 MIN PRN
Status: DISCONTINUED | OUTPATIENT
Start: 2021-11-11 | End: 2021-11-13 | Stop reason: HOSPADM

## 2021-11-11 RX ORDER — GABAPENTIN 100 MG/1
100 CAPSULE ORAL
Status: DISCONTINUED | OUTPATIENT
Start: 2021-11-11 | End: 2021-11-11 | Stop reason: HOSPADM

## 2021-11-11 RX ORDER — ACETAMINOPHEN 325 MG/1
975 TABLET ORAL ONCE
Status: DISCONTINUED | OUTPATIENT
Start: 2021-11-11 | End: 2021-11-11 | Stop reason: HOSPADM

## 2021-11-11 RX ORDER — DEXAMETHASONE SODIUM PHOSPHATE 4 MG/ML
INJECTION, SOLUTION INTRA-ARTICULAR; INTRALESIONAL; INTRAMUSCULAR; INTRAVENOUS; SOFT TISSUE PRN
Status: DISCONTINUED | OUTPATIENT
Start: 2021-11-11 | End: 2021-11-11

## 2021-11-11 RX ORDER — SODIUM CHLORIDE, SODIUM LACTATE, POTASSIUM CHLORIDE, CALCIUM CHLORIDE 600; 310; 30; 20 MG/100ML; MG/100ML; MG/100ML; MG/100ML
INJECTION, SOLUTION INTRAVENOUS CONTINUOUS
Status: DISCONTINUED | OUTPATIENT
Start: 2021-11-11 | End: 2021-11-11 | Stop reason: HOSPADM

## 2021-11-11 RX ORDER — LIDOCAINE HYDROCHLORIDE 20 MG/ML
INJECTION, SOLUTION INFILTRATION; PERINEURAL PRN
Status: DISCONTINUED | OUTPATIENT
Start: 2021-11-11 | End: 2021-11-11

## 2021-11-11 RX ORDER — PROPOFOL 10 MG/ML
INJECTION, EMULSION INTRAVENOUS CONTINUOUS PRN
Status: DISCONTINUED | OUTPATIENT
Start: 2021-11-11 | End: 2021-11-11

## 2021-11-11 RX ORDER — FLUMAZENIL 0.1 MG/ML
0.2 INJECTION, SOLUTION INTRAVENOUS
Status: DISCONTINUED | OUTPATIENT
Start: 2021-11-11 | End: 2021-11-11 | Stop reason: HOSPADM

## 2021-11-11 RX ORDER — LIDOCAINE 40 MG/G
CREAM TOPICAL
Status: DISCONTINUED | OUTPATIENT
Start: 2021-11-11 | End: 2021-11-11 | Stop reason: HOSPADM

## 2021-11-11 RX ORDER — FENTANYL CITRATE 50 UG/ML
25 INJECTION, SOLUTION INTRAMUSCULAR; INTRAVENOUS EVERY 5 MIN PRN
Status: DISCONTINUED | OUTPATIENT
Start: 2021-11-11 | End: 2021-11-11 | Stop reason: HOSPADM

## 2021-11-11 RX ORDER — OXYCODONE HYDROCHLORIDE 5 MG/1
5 TABLET ORAL EVERY 4 HOURS PRN
Status: DISCONTINUED | OUTPATIENT
Start: 2021-11-11 | End: 2021-11-13 | Stop reason: HOSPADM

## 2021-11-11 RX ORDER — OXYCODONE HYDROCHLORIDE 5 MG/1
5-10 TABLET ORAL EVERY 4 HOURS PRN
Qty: 15 TABLET | Refills: 0 | Status: SHIPPED | OUTPATIENT
Start: 2021-11-11 | End: 2022-06-06

## 2021-11-11 RX ORDER — ONDANSETRON 2 MG/ML
INJECTION INTRAMUSCULAR; INTRAVENOUS PRN
Status: DISCONTINUED | OUTPATIENT
Start: 2021-11-11 | End: 2021-11-11

## 2021-11-11 RX ORDER — SODIUM CHLORIDE, SODIUM LACTATE, POTASSIUM CHLORIDE, CALCIUM CHLORIDE 600; 310; 30; 20 MG/100ML; MG/100ML; MG/100ML; MG/100ML
INJECTION, SOLUTION INTRAVENOUS CONTINUOUS PRN
Status: DISCONTINUED | OUTPATIENT
Start: 2021-11-11 | End: 2021-11-11

## 2021-11-11 RX ORDER — PROPOFOL 10 MG/ML
INJECTION, EMULSION INTRAVENOUS PRN
Status: DISCONTINUED | OUTPATIENT
Start: 2021-11-11 | End: 2021-11-11

## 2021-11-11 RX ORDER — HYDROMORPHONE HYDROCHLORIDE 1 MG/ML
0.2 INJECTION, SOLUTION INTRAMUSCULAR; INTRAVENOUS; SUBCUTANEOUS EVERY 5 MIN PRN
Status: DISCONTINUED | OUTPATIENT
Start: 2021-11-11 | End: 2021-11-11 | Stop reason: HOSPADM

## 2021-11-11 RX ORDER — FENTANYL CITRATE 50 UG/ML
INJECTION, SOLUTION INTRAMUSCULAR; INTRAVENOUS PRN
Status: DISCONTINUED | OUTPATIENT
Start: 2021-11-11 | End: 2021-11-11

## 2021-11-11 RX ORDER — DIPHENHYDRAMINE HYDROCHLORIDE 50 MG/ML
INJECTION INTRAMUSCULAR; INTRAVENOUS PRN
Status: DISCONTINUED | OUTPATIENT
Start: 2021-11-11 | End: 2021-11-11

## 2021-11-11 RX ORDER — CEFAZOLIN SODIUM 2 G/50ML
2 SOLUTION INTRAVENOUS SEE ADMIN INSTRUCTIONS
Status: DISCONTINUED | OUTPATIENT
Start: 2021-11-11 | End: 2021-11-11 | Stop reason: HOSPADM

## 2021-11-11 RX ORDER — EPHEDRINE SULFATE 50 MG/ML
INJECTION, SOLUTION INTRAMUSCULAR; INTRAVENOUS; SUBCUTANEOUS PRN
Status: DISCONTINUED | OUTPATIENT
Start: 2021-11-11 | End: 2021-11-11

## 2021-11-11 RX ORDER — BUPIVACAINE HYDROCHLORIDE 5 MG/ML
INJECTION, SOLUTION EPIDURAL; INTRACAUDAL
Status: DISCONTINUED | OUTPATIENT
Start: 2021-11-11 | End: 2021-11-11

## 2021-11-11 RX ORDER — FENTANYL CITRATE 50 UG/ML
25-50 INJECTION, SOLUTION INTRAMUSCULAR; INTRAVENOUS
Status: DISCONTINUED | OUTPATIENT
Start: 2021-11-11 | End: 2021-11-11 | Stop reason: HOSPADM

## 2021-11-11 RX ORDER — GLYCOPYRROLATE 0.2 MG/ML
INJECTION, SOLUTION INTRAMUSCULAR; INTRAVENOUS PRN
Status: DISCONTINUED | OUTPATIENT
Start: 2021-11-11 | End: 2021-11-11

## 2021-11-11 RX ORDER — BUPIVACAINE HYDROCHLORIDE 2.5 MG/ML
INJECTION, SOLUTION INFILTRATION; PERINEURAL PRN
Status: DISCONTINUED | OUTPATIENT
Start: 2021-11-11 | End: 2021-11-11 | Stop reason: HOSPADM

## 2021-11-11 RX ORDER — CEFAZOLIN SODIUM 2 G/50ML
2 SOLUTION INTRAVENOUS
Status: COMPLETED | OUTPATIENT
Start: 2021-11-11 | End: 2021-11-11

## 2021-11-11 RX ORDER — FENTANYL CITRATE 50 UG/ML
25 INJECTION, SOLUTION INTRAMUSCULAR; INTRAVENOUS
Status: DISCONTINUED | OUTPATIENT
Start: 2021-11-11 | End: 2021-11-13 | Stop reason: HOSPADM

## 2021-11-11 RX ORDER — SODIUM CHLORIDE, SODIUM LACTATE, POTASSIUM CHLORIDE, CALCIUM CHLORIDE 600; 310; 30; 20 MG/100ML; MG/100ML; MG/100ML; MG/100ML
INJECTION, SOLUTION INTRAVENOUS CONTINUOUS
Status: DISCONTINUED | OUTPATIENT
Start: 2021-11-11 | End: 2021-11-13 | Stop reason: HOSPADM

## 2021-11-11 RX ORDER — ONDANSETRON 4 MG/1
4 TABLET, ORALLY DISINTEGRATING ORAL EVERY 30 MIN PRN
Status: DISCONTINUED | OUTPATIENT
Start: 2021-11-11 | End: 2021-11-13 | Stop reason: HOSPADM

## 2021-11-11 RX ORDER — MEPERIDINE HYDROCHLORIDE 25 MG/ML
12.5 INJECTION INTRAMUSCULAR; INTRAVENOUS; SUBCUTANEOUS
Status: DISCONTINUED | OUTPATIENT
Start: 2021-11-11 | End: 2021-11-13 | Stop reason: HOSPADM

## 2021-11-11 RX ORDER — SCOLOPAMINE TRANSDERMAL SYSTEM 1 MG/1
1 PATCH, EXTENDED RELEASE TRANSDERMAL ONCE
Status: COMPLETED | OUTPATIENT
Start: 2021-11-11 | End: 2021-11-12

## 2021-11-11 RX ADMIN — ONDANSETRON 4 MG: 2 INJECTION INTRAMUSCULAR; INTRAVENOUS at 11:03

## 2021-11-11 RX ADMIN — Medication 100 MCG: at 11:36

## 2021-11-11 RX ADMIN — BUPIVACAINE HYDROCHLORIDE 10 ML: 5 INJECTION, SOLUTION EPIDURAL; INTRACAUDAL at 10:45

## 2021-11-11 RX ADMIN — SODIUM CHLORIDE, SODIUM LACTATE, POTASSIUM CHLORIDE, CALCIUM CHLORIDE: 600; 310; 30; 20 INJECTION, SOLUTION INTRAVENOUS at 10:56

## 2021-11-11 RX ADMIN — DEXAMETHASONE SODIUM PHOSPHATE 4 MG: 4 INJECTION, SOLUTION INTRA-ARTICULAR; INTRALESIONAL; INTRAMUSCULAR; INTRAVENOUS; SOFT TISSUE at 11:22

## 2021-11-11 RX ADMIN — CEFAZOLIN SODIUM 2 G: 2 SOLUTION INTRAVENOUS at 11:10

## 2021-11-11 RX ADMIN — Medication 200 MCG: at 11:19

## 2021-11-11 RX ADMIN — EPHEDRINE SULFATE 10 MG: 50 INJECTION, SOLUTION INTRAMUSCULAR; INTRAVENOUS; SUBCUTANEOUS at 11:19

## 2021-11-11 RX ADMIN — LIDOCAINE HYDROCHLORIDE 100 MG: 20 INJECTION, SOLUTION INFILTRATION; PERINEURAL at 11:03

## 2021-11-11 RX ADMIN — DIPHENHYDRAMINE HYDROCHLORIDE 25 MG: 50 INJECTION INTRAMUSCULAR; INTRAVENOUS at 11:46

## 2021-11-11 RX ADMIN — GLYCOPYRROLATE 0.2 MG: 0.2 INJECTION, SOLUTION INTRAMUSCULAR; INTRAVENOUS at 11:25

## 2021-11-11 RX ADMIN — EPHEDRINE SULFATE 5 MG: 50 INJECTION, SOLUTION INTRAMUSCULAR; INTRAVENOUS; SUBCUTANEOUS at 11:15

## 2021-11-11 RX ADMIN — Medication 200 MCG: at 11:39

## 2021-11-11 RX ADMIN — PROPOFOL 150 MCG/KG/MIN: 10 INJECTION, EMULSION INTRAVENOUS at 11:07

## 2021-11-11 RX ADMIN — FENTANYL CITRATE 50 MCG: 50 INJECTION, SOLUTION INTRAMUSCULAR; INTRAVENOUS at 10:37

## 2021-11-11 RX ADMIN — PROPOFOL 150 MG: 10 INJECTION, EMULSION INTRAVENOUS at 11:03

## 2021-11-11 RX ADMIN — SCOLOPAMINE TRANSDERMAL SYSTEM 1 PATCH: 1 PATCH, EXTENDED RELEASE TRANSDERMAL at 10:03

## 2021-11-11 RX ADMIN — EPHEDRINE SULFATE 10 MG: 50 INJECTION, SOLUTION INTRAMUSCULAR; INTRAVENOUS; SUBCUTANEOUS at 11:22

## 2021-11-11 RX ADMIN — FENTANYL CITRATE 50 MCG: 50 INJECTION, SOLUTION INTRAMUSCULAR; INTRAVENOUS at 11:03

## 2021-11-11 RX ADMIN — Medication 100 MCG: at 11:29

## 2021-11-11 ASSESSMENT — LIFESTYLE VARIABLES: TOBACCO_USE: 0

## 2021-11-11 ASSESSMENT — MIFFLIN-ST. JEOR: SCORE: 1278.02

## 2021-11-11 ASSESSMENT — ENCOUNTER SYMPTOMS: SEIZURES: 0

## 2021-11-11 NOTE — ANESTHESIA POSTPROCEDURE EVALUATION
Patient: Radha Jameson    Procedure: Procedure(s):  HERNIORRHAPHY, VENTRAL, OPEN  EXAM UNDER ANESTHESIA, WITH SUTURE REMOVAL       Diagnosis:Ventral hernia without obstruction or gangrene [K43.9]  Diagnosis Additional Information: No value filed.    Anesthesia Type:  General    Note:  Disposition: Outpatient   Postop Pain Control: Uneventful            Sign Out: Well controlled pain   PONV: No   Neuro/Psych: Uneventful            Sign Out: Acceptable/Baseline neuro status   Airway/Respiratory: Uneventful            Sign Out: Acceptable/Baseline resp. status   CV/Hemodynamics: Uneventful            Sign Out: Acceptable CV status; No obvious hypovolemia; No obvious fluid overload   Other NRE: NONE   DID A NON-ROUTINE EVENT OCCUR? No           Last vitals:  Vitals Value Taken Time   /98 11/11/21 1224   Temp 36.7  C (98  F) 11/11/21 1224   Pulse 65 11/11/21 1224   Resp 14 11/11/21 1224   SpO2 97 % 11/11/21 1224       Electronically Signed By: Anival Pritchard MD  November 11, 2021  5:40 PM

## 2021-11-11 NOTE — DISCHARGE INSTRUCTIONS
"[facility]  Home Care Following Anesthesia  For 24 hours after surgery:  1. Get plenty of rest.  A responsible adult must stay with you for at least 24 hours after you leave the surgery center.  2. Do not drive or use heavy equipment.  If you have weakness or tingling, don't drive or use heavy equipment until this feeling goes away.   3. Do not drink alcohol.   4. Avoid strenuous or risky activities.  Ask for help when climbing stairs.  5. You may feel lightheaded.  IF so, sit for a few minutes before standing.  Have someone help you get up.   6. If you have nausea (feel sick to your stomach): Drink only clear liquids such as apple juice, ginger ale, broth or 7-Up.  Rest may also help.  Be sure to drink enough fluids.  Move to a regular diet as you feel able.   7. You may have a slight fever.  Call the doctor if your fever is over 100 F (37.7 C) (taken under the tongue) or lasts longer than 24 hours.  8. You may have a dry mouth, a sore throat, muscle aches or trouble sleeping. These should go away after 24 hours.  9. Do not make important or legal decisions.   10. It is recommended to avoid smoking.        Today you received an Exparel block to numb the nerves near your surgery site.  This is a block using local anesthetic or \"numbing\" medication injected around the nerves to anesthetize or \"numb\" the area supplied by those nerves.  This block is injected into the muscle layer near your surgical site.  This medication may numb the location where you had surgery up to 72 hours.  If your surgical site is an arm or leg you should be careful with your affected limb, since it is possible to injure your limb without being aware of it due to the numbing.  Until full feeling returns, you should guard against bumping or hitting your limb, and avoid extreme hot or cold temperatures on the skin.  As the block wears off, the feeling will return as a tingling or prickly sensation near your " surgical site.  You will experince more discomfort from your incision as the feeling returns.  You may want to take a pain pill (a narcotic or Tylenol if this was prescribed by your surgeon) when you start to experience mild pain before the pain beomes more severe.  If your pain medications do not control your pain, you should notify your surgeon.    Tips for taking pain medications  To get the best pain relief possible, remember these points:    Take pain medications as directed, before pain becomes severe.    Pain medication can upset your stomach: taking it with food may help.    Constipation is a common side effect of pain medication. Drink plenty of  fluids.    Eat foods high in fiber. Take a stool softener if recommended by your doctor or pharmacist.    Do not drink alcohol, drive or operate machinery while taking pain medications.    Ask about other ways to control pain, such as with heat, ice or relaxation.    Tylenol/Acetaminophen Consumption  To help encourage the safe use of acetaminophen, the makers of TYLENOL  have lowered the maximum daily dose for single-ingredient Extra Strength TYLENOL  (acetaminophen) products sold in the [place] from 8 pills per day (4,000 mg) to 6 pills per day (3,000 mg). The dosing interval has also changed from 2 pills every 4-6 hours to 2 pills every 6 hours.    If you feel your pain relief is insufficient, you may take Tylenol/Acetaminophen in addition to your narcotic pain medication.     Be careful not to exceed 3,000 mg of Tylenol/Acetaminophen in a 24 hour period from all sources.    If you are taking extra strength Tylenol/acetaminophen (500 mg), the maximum dose is 6 tablets in 24 hours.    If you are taking regular strength acetaminophen (325 mg), the maximum dose is 9 tablets in 24 hours.    Call a doctor for any of the followin. Signs of infection (fever, growing tenderness at the surgery site, a large amount of drainage or bleeding, severe pain, foul-smelling  drainage, redness, swelling).  2. It has been over 8 to 10 hours since surgery and you are still not able to urinate (pass water).  3. Headache for over 24 hours.  4. Signs of Covid-19 infection (temperature over 100 degrees, shortness of breath, cough, loss of taste/smell, generalized body aches, persistent headache, chills, sore throat, nausea/vomiting/diarrhea)  Your doctor is:  Dr. Yemi Del Rio, General Surgery: [phone]                  Or dial [phone] and ask for the resident on call for:  General Surgery  For emergency care, call the:  [facility]:  [phone] (TTY for hearing impaired: [phone])

## 2021-11-11 NOTE — ANESTHESIA CARE TRANSFER NOTE
Patient: Radha COSTA Stalcar    Procedure: Procedure(s):  HERNIORRHAPHY, VENTRAL, OPEN  EXAM UNDER ANESTHESIA, WITH SUTURE REMOVAL       Diagnosis: Ventral hernia without obstruction or gangrene [K43.9]  Diagnosis Additional Information: No value filed.    Anesthesia Type:   No value filed.     Note:      Level of Consciousness: drowsy  Oxygen Supplementation: nasal cannula    Independent Airway: airway patency satisfactory and stable        Patient transferred to: PACU    Handoff Report: Identifed the Patient, Identified the Reponsible Provider, Reviewed the pertinent medical history, Discussed the surgical course, Reviewed Intra-OP anesthesia mangement and issues during anesthesia, Set expectations for post-procedure period and Allowed opportunity for questions and acknowledgement of understanding      Vitals:  Vitals Value Taken Time   BP     Temp 36.7  C (98  F) 11/11/21 1159   Pulse 70 11/11/21 1159   Resp 14 11/11/21 1159   SpO2 100 % 11/11/21 1159       Electronically Signed By: LEVI Armstrong CRNA  November 11, 2021  12:02 PM

## 2021-11-11 NOTE — ANESTHESIA PREPROCEDURE EVALUATION
Anesthesia Pre-Procedure Evaluation    Patient: Radha Jameson   MRN: [id number] : 1960        Preoperative Diagnosis: Ventral hernia without obstruction or gangrene [K43.9]    Procedure : Procedure(s):  HERNIORRHAPHY, VENTRAL, OPEN  EXAM UNDER ANESTHESIA, WITH SUTURE REMOVAL          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     Ulcerative pancolitis with complication (H)      Umbilical hernia without obstruction and without gangrene       Past Surgical History:   Procedure Laterality Date     BILATERAL OOPHORECTOMY       BREAST SURGERY      reconstruction      SECTION       COLECTOMY TOTAL       COLONOSCOPY       CV FEMORAL ANGIOGRAM       DAVINCI COLECTOMY N/A 6/10/2021    Procedure: ROBOT-ASSISTED  ileal pouch-anal anastomosis, diverting loop ileostomy;  Surgeon: Uriel Hung MD;  Location: [facility] OR     EGD      , 2017     GASTRIC FUNDOPLICATION  2017     HIATAL HERNIA REPAIR  2017    LINX     INSERT STENT URETER N/A 6/10/2021    Procedure: INSERTION, STENT, URETER, PREOPERATIVE;  Surgeon: Joaquin Dixon MD;  Location: [facility] OR     OVARIAN CYST REMOVAL       PELVIC LAPAROSCOPY       Removal of LINX       RLE Venogram/thrombolysis Right 05/15/2020     SIGMOIDOSCOPY FLEXIBLE N/A 6/10/2021    Procedure: SIGMOIDOSCOPY, FLEXIBLE;  Surgeon: Uriel Hung MD;  Location: [facility] OR     TAKEDOWN ILEOSTOMY N/A 8/10/2021    Procedure: CLOSURE, ILEOSTOMY;  Surgeon: Uriel Hung MD;  Location: [facility] OR      Allergies   Allergen  Reactions     Warfarin Other (See Comments)     Internal bleeding reaction.     Wasp Venom Protein Angioedema and Other (See Comments)     Morphine Itching     Itching in throat per pt     Amlodipine Other (See Comments)     Edema        Social History     Tobacco Use     Smoking status: Never Smoker     Smokeless tobacco: Never Used   Substance Use Topics     Alcohol use: Yes      Wt Readings from Last 1 Encounters:   11/11/21 64.9 kg (143 lb)        Anesthesia Evaluation   Pt has had prior anesthetic. Type: General and MAC.    History of anesthetic complications  - PONV.  Two episodes of hypotension during surgery.    ROS/MED HX  ENT/Pulmonary:     (+) NICKI risk factors, snores loudly, hypertension,  (-) tobacco use   Neurologic:  - neg neurologic ROS  (-) no seizures, no CVA and no TIA   Cardiovascular:     (+) hypertension-----Taking blood thinners Previous cardiac testing   Echo: Date: Results:    Stress Test: Date: Results:    ECG Reviewed: Date: 7/26/20 Results:  SB  Cath: Date: Results:      METS/Exercise Tolerance: >4 METS    Hematologic: Comments: Lupus anticoagulant     (+) History of blood clots, pt is not anticoagulated, anemia, history of blood transfusion, no previous transfusion reaction, Known PRBC Anitbodies:No     Musculoskeletal: Comment: Firbromyalgia, neck pain, DJD      GI/Hepatic: Comment: History of Matson's  Denies GERD symptoms  S/p Fundoplication     S/p TAC, s/p completion proctectomy with J pouch and DLE, s/p takedown of DLE.     (+) Inflammatory bowel disease,  (-) GERD   Renal/Genitourinary: Comment: PCOS - s/p bilateral oophorectomy     (+) renal disease, type: CRI, Pt does not require dialysis,     Endo:     (+) thyroid problem,  Thyroid disease - Other history of thyrotoxicosis due to herbal supplement,     Psychiatric/Substance Use:     (+) psychiatric history anxiety and depression     Infectious Disease:  - neg infectious disease ROS     Malignancy:  - neg malignancy ROS      Other:  - neg other ROS    (+) , H/O Chronic Pain,        Physical Exam    Airway        Mallampati: III   TM distance: > 3 FB   Neck ROM: limited   Mouth opening: > 3 cm    Respiratory Devices and Support         Dental  no notable dental history         Cardiovascular   cardiovascular exam normal          Pulmonary   pulmonary exam normal                OUTSIDE LABS:  CBC:   Lab Results   Component Value Date    WBC 7.2 08/18/2021    WBC 6.2 07/26/2021    HGB 12.1 11/08/2021    HGB 11.4 (L) 10/13/2021    HCT 32.8 (L) 08/18/2021    HCT 38.3 07/26/2021     08/18/2021     08/11/2021     BMP:   Lab Results   Component Value Date     11/08/2021     08/18/2021    POTASSIUM 5.0 11/08/2021    POTASSIUM 4.3 10/13/2021    CHLORIDE 109 11/08/2021    CHLORIDE 108 08/18/2021    CO2 25 11/08/2021    CO2 31 08/18/2021    BUN 49 (H) 11/08/2021    BUN 31 (H) 08/18/2021    CR 2.98 (H) 11/08/2021    CR 2.75 (H) 10/13/2021    GLC 89 11/08/2021    GLC 78 08/18/2021     COAGS:   Lab Results   Component Value Date    PTT 26 06/02/2021    INR 1.02 06/02/2021     POC:   Lab Results   Component Value Date    BGM 89 06/10/2021     HEPATIC:   Lab Results   Component Value Date    ALBUMIN 3.9 11/08/2021    PROTTOTAL 7.6 07/26/2021    ALT 20 07/26/2021    AST 16 07/26/2021    ALKPHOS 58 07/26/2021    BILITOTAL 0.5 07/26/2021     OTHER:   Lab Results   Component Value Date    STEPHANIE 9.1 11/08/2021    PHOS 4.2 11/08/2021    MAG 1.8 08/11/2021       Anesthesia Plan    ASA Status:  3   NPO Status:  NPO Appropriate    Anesthesia Type: General.     - Airway: LMA   Induction: Intravenous.   Maintenance: Balanced.   Techniques and Equipment:     Airway: VL intubation as backup.         Consents    Anesthesia Plan(s) and associated risks, benefits, and realistic alternatives discussed. Questions answered and patient/representative(s) expressed understanding.     - Discussed with:  Patient         Postoperative Care    Pain  management: IV analgesics, Oral pain medications, Multi-modal analgesia.   PONV prophylaxis: Ondansetron (or other 5HT-3), Dexamethasone or Solumedrol, Background Propofol Infusion, Scopolamine patch     Comments:                Anival Pritchard MD

## 2021-11-11 NOTE — ADDENDUM NOTE
Addendum  created 11/11/21 1748 by Anival Pritchard MD    Attestation recorded in Intraprocedure, Child order released for a procedure order, Clinical Note Signed, Diagnosis association updated, Intraprocedure Attestations filed, Intraprocedure Blocks edited

## 2021-11-11 NOTE — OP NOTE
Procedure Date: 11/11/2021    SURGEON:  Yemi Del Rio MD    PROCEDURE PERFORMED:     1.  Open mesh repair of incisional ventral hernia.  2.  Removal of Prolene suture.  Refer to below     ANESTHESIA:  General anesthetic.    INDICATIONS FOR PROCEDURE:  The patient presents with incisional ventral hernia at the lower midline.  She also had an ostomy closed at the right lower quadrant with permanent suture irritating at the skin and asked for removal of that suture.  Informed consent was obtained.    OPERATIVE FINDINGS:    1.  Incisional ventral hernia, approximately 6 x 3 cm defect.  Retrorectus repair using Parietex ProGrip mesh.  Refer to below.  2.  Individual sutures of Prolene were removed from the site as requested.  Refer to below.    DESCRIPTION OF PROCEDURE:  The patient was brought to the operating room, put under general anesthesia.  Abdomen widely prepped and draped in sterile fashion.  Skin incision was made over the incisional ventral hernia at the lower midline and dissection carried down to the fascia circumferentially clearing this. The hernia sac was not entered and I elected to invert this using a running 3-0 Vicryl.  The retrorectus space was then developed.  This allowed for closure of the posterior rectus sheath, which was closed with a running 2-0 PDS.  An 8 x 15 cm Parietex ProGrip mesh was then placed in this retrorectus space to completely cover the closed fascia posteriorly.  The anterior fascia was then closed with a running 0 PDS without difficulty.  Skin incision was made over the previous ostomy site at the right lower quadrant and this was carried down to the fascia, where 2 large loop sutures of 0 Prolene were removed without difficulty.  Skin was then closed with subcuticular throughout.  Steri-Strips were applied.  Estimated blood loss was minimal.  The patient tolerated the procedure well and was taken to recovery room where she was without difficulty or complication.      Yemi RIVER  MD Quang        D: 2021   T: 2021   MT: [id number]    Name:     SCOTT MATUTE  MRN:      [id number]        Account:        [id number]   :      1960           Procedure Date: 2021     Document: [id number]

## 2021-11-11 NOTE — OR NURSING
Patient received bilateral Transverse Abdominis Plane nerve block  with Exparel.  Fentanyl 50mcg and Versed 1mg given. Tolerated procedure well.

## 2021-11-11 NOTE — ANESTHESIA PROCEDURE NOTES
Rectus Sheath Procedure Note    Pre-Procedure   Staff -        Anesthesiologist:  Anival Pritchard MD       Performed By: anesthesiologist       Location: pre-op       Pre-Anesthestic Checklist: patient identified, IV checked, site marked, risks and benefits discussed, informed consent, monitors and equipment checked, pre-op evaluation, at physician/surgeon's request and post-op pain management  Timeout:       Correct Patient: Yes        Correct Procedure: Yes        Correct Site: Yes        Correct Position: Yes        Correct Laterality: Yes        Site Marked: Yes  Procedure Documentation  Procedure: Rectus Sheath       Diagnosis: POST OPERATIVE PAIN       Laterality: bilateral       Patient Position: supine       Patient Prep/Sterile Barriers: sterile gloves, mask       Skin prep: Chloraprep       Needle Type: short bevel       Needle Gauge: 21.        Needle Length (millimeters): 110        Ultrasound guided       1. Ultrasound was used to identify targeted nerve, plexus, vascular marker, or fascial plane and place a needle adjacent to it in real-time.       2. Ultrasound was used to visualize the spread of anesthetic in close proximity to the above referenced structure.       3. A permanent image is entered into the patient's record.       4. The visualized anatomic structures appeared normal.       5. There were no apparent abnormal pathologic findings.    Assessment/Narrative         The placement was negative for: blood aspirated, painful injection and site bleeding       Paresthesias: No.     Bolus given via needle..        Secured via.        Insertion/Infusion Method: Single Shot       Complications: none       Injection made incrementally with aspirations every 5 mL.    Medication(s) Administered   Bupivacaine 0.5% PF (Infiltration), 10 mL  Bupivacaine liposome (Exparel) 1.3% LA inj susp (Infiltration), 20 mL  Medication Administration Time: 11/11/2021 10:45 AM     Comments:  266 mg of Exparel used in  bilateral rectus sheath block

## 2021-11-11 NOTE — BRIEF OP NOTE
[facility] Brief Operative Note    Pre-operative diagnosis: Ventral hernia without obstruction or gangrene [K43.9]   Post-operative diagnosis Same as Pre-op Dx   Procedure: Procedure(s):  HERNIORRHAPHY, VENTRAL, OPEN  EXAM UNDER ANESTHESIA, WITH SUTURE REMOVAL   Surgeon: Yemi Del Rio MD   Assistants(s):    Estimated blood loss: 1 mL    Specimens: no   Findings: yes

## 2021-11-15 ENCOUNTER — PATIENT OUTREACH (OUTPATIENT)
Dept: SURGERY | Facility: CLINIC | Age: 61
End: 2021-11-15
Payer: COMMERCIAL

## 2021-11-15 NOTE — PROGRESS NOTES
RN Post-Op/Post-Discharge Care Coordination Note    Ms. Radha Jameson is a 61 year old female who underwent open ventral hernia repair on 11/11 with  Dr. Yemi Del Rio. Spoke with Patient.    Support  Patient able to care for self independently     Health Status  Nausea/Vomiting: Patient reports feeling nauseated. Dry heaving episode x1 a couple of days ago but nothing since. Advised to not take her medications on an empty stomach as this can sometimes cause stomach upset.  Eating/drinking: Patient is able to eat and drink without any complaints.  Bowel habits: Patient reports having a normal bowel movement.  Drains (ISAAC): N/A  Fevers/chills: Patient denies any fever or chills.  Incisions: Patient denies any signs and symptoms of infection..  Wound closure:  Steri-strips  Pain: tolerable. Not taking anything for pain control at this time. Advised that as long as she is able to get up and move around and practice coughing and deep breathing she doesn't need to take anything for pain. Advised on appropriate use of Tylenol (per package instructions). Also advised on alternating ice and heat for 20 minute intervals.  New Medications:  oxycodone    Activity/Restrictions  No lifting in excess of 15-20 pounds for 3-4 weeks    Equipment  Abdominal binder    Pathology reviewed with patient:  N/A    Forms/Letters  No    All of her questions were answered including reviewing restrictions and wound care.  She will call this office if she has any further questions and/or concerns.      In lieu of a post-op clinic patient that patient would like to be contacted in approximately 7-10 days via telephone.    A copy of this note was routed to the primary surgeon.      Whom and When to Call  Patient acknowledges understanding of how to manage any medication changes and   when to seek medical care.     Patient advised that if after hour medical concerns arise to please call [phone] and choose option 4 to speak to the physician on  call.

## 2021-11-16 ENCOUNTER — TELEPHONE (OUTPATIENT)
Dept: SURGERY | Facility: CLINIC | Age: 61
End: 2021-11-16
Payer: COMMERCIAL

## 2021-11-18 ENCOUNTER — TELEPHONE (OUTPATIENT)
Dept: GASTROENTEROLOGY | Facility: CLINIC | Age: 61
End: 2021-11-18
Payer: COMMERCIAL

## 2021-11-18 DIAGNOSIS — Z12.11 ENCOUNTER FOR SCREENING COLONOSCOPY: Primary | ICD-10-CM

## 2021-11-18 NOTE — LETTER
December 8, 2021      Radha Jameson  [address]  [address]  [place] [address]              Dear Radha,        Flexible Pouchoscopy  Your exam is on 12.15.2021  Arrival Time: 1:30pm  Please note that your procedure time may change  Check in at: [facility]; [address], [place] [address]    Please arrive with an adult who can drive you home after the exam and stay with you for the next 24 hours, unless your provider says otherwise.   The medicines used in the exam will make you sleepy. You will not be able to drive. You cannot take a medical cab, taxi, Uber, train or bus by yourself.    For questions or appointments, call: [facility]: [phone] Monday through Friday, 8 a.m. to 4:30 p.m.  (If it is after hours, call [phone]. Ask for the GI fellow resident on call.)    Prep prescription sent to: [id number] - [place], [place] [id number] AT [facility] [address] [facility] [address]    Split-Dose Golytely (Colyte, Nulytely)  Prep Instructions for your Colonoscopy    We get it: Bowel prep is a hassle. No one wants to stop eating, drink only clear liquids or take medicines that keep you near the toilet much of the day.    Please read these instructions carefully at least 7 days before your colonoscopy.     You must flush the bowel totally of waste so that the doctor can have a clear view and do a complete exam. It s important to follow all of these steps. Without your efforts, we may have to repeat the exam.    Follow these directions exactly, and complete all steps before the exam.    Getting ready     A nurse will call you about 1 week before your exam to review prep instructions with you.      your prescription as soon as possible. Also, buy four (4) Dulcolax (bisacodyl) tablets.     You must arrange for an adult to drive you home after your exam. Your colonoscopy cannot be done unless you have a  ride. If you need to use public transportation, someone must ride with you and stay with you for a minimum of 6 hours.    Check with your insurance company to be sure they will cover this exam.    7 days before the exam (Date):      Talk to your doctor:  If you take blood-thinners (such as Coumadin, Plavix, Xarelto), your prescription or schedule may need to change before the test.    Stop taking fiber supplements, multi-vitamins with iron, and medicines that contain iron.    Continue taking prescribed aspirin; talk to your prescribing doctor with any concerns.    Stop eating corn, nuts and foods with seeds.  These can stay in the colon for days.    If you have diabetes:  Ask to have your exam early in the morning.  Also, ask your doctor if you should change your diet or medicines.    3 days before the exam (Date):  12.12.2021    Begin a low-fiber diet: No raw fruits or vegetables, whole wheat, seeds, nuts, popcorn or other high-fiber foods (see list on page). No binding agents: (bran, Metamucil, Fibercon) and no Olestra (a fat substitute).    One day before the exam (Date):  12.14.2021    Start clear liquid diet (see list below). Drink at least 8 to 10 full glasses of clear liquids during the day.     Fill the jug that contains the Golytely powder with warm water. Cover and shake until well mixed. Use a full gallon of water. Chill in the refridgerator, but do not add ice.    Day of exam:  12.15.2021  At 7:30 AM on the day of the exam:     Start drinking the Golytely jug. Drink one 8-ounce glass every 15 minutes until the jug is half empty. Drink each glass quickly. You should finish the prep 4 hours before the exam.      You may drink clear liquids only up until 4 hours before your exam.    Don't drink anything un the 4 hours before your exam, not even water. (If you must take medicine, you can take it with a sip of water.)     Do not chew or swallow anything including water or gum for at least 4 hours before your  exam. If you do, we may cancel the exam for your safety.     Do not take diabetes medicine by mouth until after your exam.    If you have asthma, bring your inhaler.    Arrive with an adult who will take you home after your test. The medicine used will make you sleepy. If you don't have someone to take you home, we may cancel your test.     What are clear liquids?   You may have:  - Water, tea, coffee (no cream)  - Soda pop, Gatorade (not red or purple)  - Clear nutrition drinks (Enlive, Resource Breeze)   - Jell-O, Popsicles (no milk or fruit pieces) or sorbet (not red or purple)  - Fat-free soup broth or bouillon  - Plain hard candy, such as clear life savers (not red or purple)  - Clear juices and fruit-flavored drinks such as apple juice, white grape juice, Hi-C and Stevo-Aid (not red or purple)   Do not have:  - Milk or milk products such as ice cream, malts or shakes  - Red or purple drinks of any kind such as cranberry juice or grape juice. Avoid red or purple Jell-O, Popsicles, Stevo-Aid, sorbet and candy  - Juices with pulp such as orange, grapefruit, pineapple or tomato juice  - Cream soups of any kind  - Alcohol       What is a Low Fiber Diet?   You may have:  - Starches: White bread, rolls, biscuits, croissants, Deanne toast, white flour tortillas, waffles, pancakes, Pashto toast; white rice, noodles, pasta, macaroni; cooked and peeled potatoes; plain crackers, saltines; cooked farina or cream of rice; puffed rice, corn flakes, Rice Krispies, Special K   - Vegetables: vegetable broths   - Fruits and fruit juices: Strained fruit juice, canned fruit without seeds or skin (not pineapple), applesauce, pear sauce, ripe bananas, melons (not watermelon)   - Milk products: Milk (plain or flavored), cheese, cottage cheese, yogurt (no berries), custard, ice cream    - Proteins: Tender, well-cooked ground beef, lamb, veal, ham, pork, chicken, turkey, fish or organ meats; eggs; creamy peanut butter   - Fats and  condiments:  Margarine, butter, oils, mayonnaise, sour cream, salad dressing, plain gravy; spices, cooked herbs; sugar, clear jelly, honey, syrup   - Snacks, sweets and drinks: Pretzels, hard candy; plain cakes and cookies (no nuts or seeds); gelatin, plain pudding, sherbet, Popsicles; coffee, tea, carbonated ( fizzy ) drinks Do not have:  - Starches: Breads or rolls that contain nuts, seeds or fruit; whole wheat or whole grain breads that contain more than 1 gram of fiber per slice; cornbread; corn or whole wheat tortillas; potatoes with skin; brown rice, wild rice, kasha (buckwheat)   - Vegetables: Any raw or steamed vegetables; vegetables with seeds; corn in any form   - Fruits and fruit juices: Prunes, prune juice, raisins and other dried fruits, berries and other fruits with seeds, canned pineapple uices with pulp such as orange, grapefruit, pineapple or tomato juice  - Milk products: Any yogurt with nuts, seeds or berries   - Proteins: Tough, fibrous meats with gristle; cooked dried beans, peas or lentils; crunchy peanut butter   - Fats and condiments: Pickles, olives, relish, horseradish; jam, marmalade, preserves   - Snacks, sweets and drinks: Popcorn, nuts, seeds, granola, coconut, candies made with nuts or seeds; all desserts that contain nuts, seeds, raisins and other dried fruits, coconut, whole grains or bran.      Thank you for choosing [facility], for your procedure. If you are sent a survey regarding your care, please take the time to complete the questionnaire.     Updated: 9/28/2021

## 2021-11-18 NOTE — TELEPHONE ENCOUNTER
Patient scheduled for pouchoscopy on 12.1.2021.     Covid test scheduled: 11.29.2021    Arrival time: 1400    Facility location: [place]    Sedation type: MAC    Indication for procedure: J pouch surveillance    Referring provider:  Uriel Hung MD    Bowel prep recommendation: Message to Dr. Hung to verify bowel prep.    Pre visit planning completed.    Sosa Catalan RN

## 2021-11-18 NOTE — TELEPHONE ENCOUNTER
"  Uriel Hung MD Sewill, Samantha RN  Cc: P Endoscopy Nurse Pool  yes             Previous Messages       ----- Message -----   From: Sosa Catalan RN   Sent: 11/18/2021   3:20 PM CST   To: Uriel Hung MD, *   Subject: RE: 12.1.2021                                     Dr. Hung:     Looking to clarify:  pt is okay to take two fleet enemas with GFR 16?     Thank you,   Sosa Catalan RN   Pre-Assessment Endoscopy   ----- Message -----   From: Uriel Hung MD   Sent: 11/18/2021   3:07 PM CST   To: Minerva Alford, RN, Kristin Delgado RN, *   Subject: RE: 12.1.2021                                     Yes, January. Only need 2 fleets. J-pouch patients do not need a full prep. Thx!       ----- Message -----   From: Sosa Catalan RN   Sent: 11/18/2021   8:34 AM CST   To: Uriel Hung MD, Minerva Alford, RN, *   Subject: 12.1.2021                                         Dr. Hung please review and advise:     Pt is scheduled for a pouchoscopy at [facility] on 12.1.2021.     Per 10.4.2021 OV: \"Flexible pouchoscopy with me in 3 months at [facility].\"     Pt is scheduled in 2 months versus 3 months; is this okay or would you prefer pt to wait until January?     Prep recommendations for pouchoscopy?     Pt's recent GFR 16.     Clear liquids 24 hours prior to procedure? Miralax/gatorade prep?  Please advise.     Thank you,   Sosa Catalan RN   Pre-Assessment Endoscopy     "

## 2021-11-23 ENCOUNTER — VIRTUAL VISIT (OUTPATIENT)
Dept: GASTROENTEROLOGY | Facility: CLINIC | Age: 61
End: 2021-11-23
Payer: COMMERCIAL

## 2021-11-23 ENCOUNTER — PATIENT OUTREACH (OUTPATIENT)
Dept: SURGERY | Facility: CLINIC | Age: 61
End: 2021-11-23

## 2021-11-23 DIAGNOSIS — K51.019 ULCERATIVE PANCOLITIS WITH COMPLICATION (H): Primary | ICD-10-CM

## 2021-11-23 DIAGNOSIS — R63.4 UNINTENDED WEIGHT LOSS: ICD-10-CM

## 2021-11-23 DIAGNOSIS — K43.9 VENTRAL HERNIA WITHOUT OBSTRUCTION OR GANGRENE: ICD-10-CM

## 2021-11-23 DIAGNOSIS — N18.4 CHRONIC KIDNEY DISEASE, STAGE 4 (SEVERE) (H): ICD-10-CM

## 2021-11-23 PROCEDURE — 97803 MED NUTRITION INDIV SUBSEQ: CPT | Mod: TEL | Performed by: DIETITIAN, REGISTERED

## 2021-11-23 RX ORDER — WARFARIN SODIUM 2 MG/1
TABLET ORAL
COMMUNITY
Start: 2021-11-16 | End: 2022-06-06

## 2021-11-23 NOTE — PROGRESS NOTES
Radha Jameson was contacted several days post procedure via telephone for a status update and elected to have a telephone follow -up call approximately 7-10 days after original contact in lieu of a clinic visit with Dr. Yemi Del Rio.  She continues to do well and the steri-strips have peeled off.  The patients wounds are healed and the area is C/D/I.  She is afebrile, pain free, and salty po; the patient is slowly resuming her normal activity.   Discussed restrictions including no lifting in excess of 15-30 pounds for 6 weeks.    Pathology was reviewed with the patient: N/A    Post op appointment arranged 12/16 at the request of the patient for a wound check (cosmesis).

## 2021-11-23 NOTE — PATIENT INSTRUCTIONS
It was nice speaking with you today. Below are the nutrition recommendations we discussed at your visit.    Please let me know if you have any additional questions.    Nutrition Recommendations    1. Continue eating at least 5 times per day, could increase to eating 6 times per day (3 meals and 3 snacks or 6 smaller meals per day) to help increase the amount of calories you are eating daily and prevent further weight loss.    --For example to eat 6 times per day, you could either consistently have an evening snack or could have 2 snacks in the afternoon.    2. At breakfast, can have a side of oatmeal or cream of rice cereal along with your usual breakfast english muffin, Bulgarian caruso and cheese sandwich. The oatmeal and cream of rice cereal might be helpful in giving stooms some more form.    3. Re-try having some cooked green beans at your lunch and/or dinner meal. If using canned green beans, then buy no added salt options and drain the liquid from the can and rinse the green beans with fresh water before cooking (this helps reduce the sodium in the beans).    4. Re-try having some peeled apple/peeled apple slices or unsweetened applesauce along with a meal or snack. Could spread some peanut butter onto some apple slice for a snack too.    *note as you are gradually re-introducing foods and foods with more fiber, those foods with some soluble/more soluble fiber will likely be better tolerated.    Can follow up in 1-2 months or as needed.    If you would like to schedule a follow up appointment, please call [phone].    Naina Doe, MS, RD, LD

## 2021-11-23 NOTE — LETTER
11/23/2021         RE: Radha Jameson  [address]  [address]  [place] [address]        Dear Colleague,    Thank you for referring your patient, Radha Jameson, to the [facility] [place]. Please see a copy of my visit note below.    [facility] Outpatient Medical Nutrition Therapy      Time Spent:  46 minutes  Session Type:  Follow up  Referring Physician:  Uriel Hung MD  Reason for RD Visit:   Hx UC s/p J pouch, nutritional counseling     Nutrition Assessment:  Patient is a 61 year old female with history that includes congenitally absent IVC, h/o multiple DVTs, lupus anticoagulant, barretts s/p prior fundoplication, CKD stage 4 and medically refractory UC s/p prior TAC w/ EI in 10/2019 followed by robotic completion proctectomy with J-pouch formation with diverting loop ileostomy in June 2021. Now s/p loop ileostomy takedown on 8/10/2021.     At initial visit, she stated that she has ongoing issues with frequent stooling and reported having about 5 bowel movements during the day and then 4-5 bowel movements overnight.  She stated that she is taking a lot of Imodium so now her stools are getting better and less frequent than previously had been and also soft but more formed now.  She continues to feel a lot of fatigue since her last surgery in August.  She used to be a weight  and followed more of a weightlifting diet with high amounts of protein up to 140 g/day.  Most of her meals were very high protein and little carbohydrates.  She has recently added in some carbohydrates along with protein foods for some more energy, but she wonders if she is getting enough protein as she is eating about a fourth of what she is to eat.  And her recent MD visit earlier this week MD told her okay for regular diet, and previously she had been following a lower fiber diet.  She is planning to slowly add some fiber back into her diet but currently mostly  some carbohydrate foods that are lower in fiber along with some protein at meals.  She previously drank some protein drinks but not currently doing that.  Occasionally she will add some for protein drink as a creamer into her small amount of coffee.  Additionally with waking up 4-5 times at night to have a bowel movement it is disrupting her sleep as well.  She stated that during the day she sits all day at her desk and seems to have less issues with stool but when she lies down at night she needs to use the bathroom and notices worsening issues with needing to have bowel movements.  At recent MD visit, and he mentioned possibly be vitamin and iron labs needed and referred patient to RD to discuss nutrition tips based on her symptoms and recent J-pouch and surgeries.    At follow up visit today (11/23/21), she reported that her appetite is good and added more protein daily to meals but not regaining weight. Reported weight has been now staying mostly stable, with a little fluctuation. She is also losing hair again. She prefers to eat meals versus drinking meals and protein drinks, so includes more protein in meals now. She does add collagen powder into 3/4 cup of coffee in the morning as well. She is eating about 5 times per day. She is still getting up at night about 6 times to use the BM (4-5 for BMs and other times to urinate). During the day she estimated having approximately about 5 BMs, most stools are soft, some form now not watery. She stated that she has had issues with her kidneys d/t CKD, so has been getting fluids 2x/week as well now. She has been still eating a lower fiber diet as she wanted to be careful about her intake. She reported that she has a pouchoscopy next week as well to make sure everything is looking good/healing.     Patient Active Problem List   Diagnosis     Ulcerative pancolitis with complication (H)     Follow-up examination after colorectal surgery     Chronic kidney disease, stage 3  "(H)     Ventral hernia without obstruction or gangrene     Height:   Ht Readings from Last 1 Encounters:   11/11/21 1.753 m (5' 9\")     Weight: reported lost weight since her surgery and reported weighing herself at home and staying stable at 143-144 lbs (first thing in morning) and at infusion for fluids in afternoon was 147 lbs.  Wt Readings from Last 10 Encounters:   11/11/21 64.9 kg (143 lb)   10/13/21 69.8 kg (153 lb 12.8 oz)   10/05/21 69.5 kg (153 lb 4.8 oz)   10/04/21 69.6 kg (153 lb 6.4 oz)   08/18/21 74.2 kg (163 lb 8 oz)   08/10/21 71 kg (156 lb 8.4 oz)   07/26/21 71.5 kg (157 lb 9.6 oz)   07/26/21 71.7 kg (158 lb)   06/24/21 75.9 kg (167 lb 4.8 oz)   06/12/21 80.6 kg (177 lb 11.2 oz)     BMI: Estimated body mass index is 21.12 kg/m  as calculated from the following:    Height as of 11/11/21: 1.753 m (5' 9\").    Weight as of 11/11/21: 64.9 kg (143 lb).    Diet Recall:  (some usual/recent meals/snacks/beverages):  Reported eating about 5 times per day.  Meal Food    Breakfast english muffin with Monegasque caruso and cheese and cup dried cereal   Lunch 1-2 pc Chicken with 1 c white rice    Dinner Varies: if out chicken breast sometimes with cheese no bun and 4-5 fries or side soup with chicken or homemade chicken noodle soup with mushroom with 5 crackers or recently had some sushi (fish and rice) or occas pizza or every other month splits a steak or grilled pork chop with rice, sometimes baked potato.   Snacks Am snack: plain chicken or rice cake with PB and afternoon: ~2 oz/half of palm of hand of grilled chicken. Sometimes Pretzels at night otherwise none at night.    Beverages 3/4 gallon Water, 3/4 c coffee with collagen powder    Alcohol Intake ~2 drinks per week Sometimes has 1 cocktail at night, a couple nights per week to help feel sleepy and unwind at home.     Labs:    Last Comprehensive Metabolic Panel:  Sodium   Date Value Ref Range Status   11/08/2021 140 133 - 144 mmol/L Final   06/24/2021 136 " 133 - 144 mmol/L Final     Potassium   Date Value Ref Range Status   11/08/2021 5.0 3.4 - 5.3 mmol/L Final   06/24/2021 4.9 3.4 - 5.3 mmol/L Final     Chloride   Date Value Ref Range Status   11/08/2021 109 94 - 109 mmol/L Final   06/24/2021 105 94 - 109 mmol/L Final     Carbon Dioxide   Date Value Ref Range Status   06/24/2021 28 20 - 32 mmol/L Final     Carbon Dioxide (CO2)   Date Value Ref Range Status   11/08/2021 25 20 - 32 mmol/L Final     Anion Gap   Date Value Ref Range Status   11/08/2021 6 3 - 14 mmol/L Final   06/24/2021 3 3 - 14 mmol/L Final     Glucose   Date Value Ref Range Status   11/08/2021 89 70 - 99 mg/dL Final   06/24/2021 132 (H) 70 - 99 mg/dL Final     Urea Nitrogen   Date Value Ref Range Status   11/08/2021 49 (H) 7 - 30 mg/dL Final   06/24/2021 29 7 - 30 mg/dL Final     Creatinine   Date Value Ref Range Status   11/08/2021 2.98 (H) 0.52 - 1.04 mg/dL Final   06/24/2021 2.80 (H) 0.52 - 1.04 mg/dL Final     GFR Estimate   Date Value Ref Range Status   11/08/2021 16 (L) >60 mL/min/1.73m2 Final     Comment:     As of July 11, 2021, eGFR is calculated by the CKD-EPI creatinine equation, without race adjustment. eGFR can be influenced by muscle mass, exercise, and diet. The reported eGFR is an estimation only and is only applicable if the renal function is stable.   06/24/2021 18 (L) >60 mL/min/[1.73_m2] Final     Comment:     Non  GFR Calc  Starting 12/18/2018, serum creatinine based estimated GFR (eGFR) will be   calculated using the Chronic Kidney Disease Epidemiology Collaboration   (CKD-EPI) equation.       Calcium   Date Value Ref Range Status   11/08/2021 9.1 8.5 - 10.1 mg/dL Final   06/24/2021 8.6 8.5 - 10.1 mg/dL Final     CBC RESULTS:   Recent Labs   Lab Test 08/18/21  1114   WBC 7.2   RBC 3.50*   HGB 10.6*   HCT 32.8*   MCV 94   MCH 30.3   MCHC 32.3   RDW 17.6*        Pertinent Medications/vitamin and mineral supplements:   Taking imodium couple times per day.    Current Outpatient Medications   Medication     acetaminophen (TYLENOL) 500 MG tablet     BIOTIN PO     busPIRone (BUSPAR) 5 MG tablet     carvedilol (COREG) 25 MG tablet     cholecalciferol 25 MCG (1000 UT) TABS     dutasteride (AVODART) 0.5 MG capsule     EPINEPHrine (ANY BX GENERIC EQUIV) 0.3 MG/0.3ML injection 2-pack     escitalopram (LEXAPRO) 20 MG tablet     finasteride (PROSCAR) 5 MG tablet     ketoconazole (NIZORAL) 2 % external shampoo     loperamide (IMODIUM A-D) 2 MG tablet     nortriptyline (PAMELOR) 25 MG capsule     oxyCODONE (ROXICODONE) 5 MG tablet     warfarin ANTICOAGULANT (COUMADIN) 2 MG tablet     aspirin (ASA) 325 MG EC tablet     augmented betamethasone dipropionate (DIPROLENE) 0.05 % external lotion     loperamide (IMODIUM A-D) 2 MG tablet     No current facility-administered medications for this visit.     Food Allergies:  NKFA   Physical Activity:  Used to lift weight and has some light weight at home but she would like to start lifting again as she is able.    MALNUTRITION:  % Weight Loss:  > 10% in 6 months (severe malnutrition)  % Intake:  Decreased intake does not meet criteria for malnutrition   Subcutaneous Fat Loss:  Unable to assess  Muscle Loss:  Unable to assess  Fluid Retention:  None noted    Malnutrition Diagnosis: Patient does not meet two of the above criteria necessary for diagnosing malnutrition  In Context of:  Chronic illness or disease    Nutrition Diagnosis:      Food and nutrition related knowledge deficit related to lack of previous diet education/lack of complete recall of previous diet education as evidenced by pt report and interest in diet education/review.    Nutrition Prescription: smaller more frequent meals with adequate calories and protein.    Nutrition Intervention:    Nutrition Education/Counseling:  Provided diet education with tips and suggestions for increasing calories and protein in a manner in which may be better tolerated. Recommended eating  smaller more frequent meals, restart protein drinks and gradually incorporating more fiber slowly into diet as tolerated especially some soluble fiber sources. Reviewed some that she could include based on her food preferences and usual meals.    Educational Materials Provided:  NCM: soluble and insoluble fiber handout and AVS  Patient verbalized understanding of education provided. See Goals below.     Goals:  1. Continue eating at least 5 times per day, could increase to eating 6 times per day (3 meals and 3 snacks or 6 smaller meals per day) to help increase the amount of calories you are eating daily and prevent further weight loss.    --For example to eat 6 times per day, you could either consistently have an evening snack or could have 2 snacks in the afternoon.    2. At breakfast, can have a side of oatmeal or cream of rice cereal along with your usual breakfast english muffin, Icelandic caruso and cheese sandwich. The oatmeal and cream of rice cereal might be helpful in giving stooms some more form.    3. Re-try having some cooked green beans at your lunch and/or dinner meal. If using canned green beans, then buy no added salt options and drain the liquid from the can and rinse the green beans with fresh water before cooking (this helps reduce the sodium in the beans).    4. Re-try having some peeled apple/peeled apple slices or unsweetened applesauce along with a meal or snack. Could spread some peanut butter onto some apple slice for a snack too.    *note as you are gradually re-introducing foods and foods with more fiber, those foods with some soluble/more soluble fiber will likely be better tolerated.    Nutrition Monitoring and Evaluation: Will monitor adherence to nutrition recommendations at future RD visits.     Further Medical Nutrition Therapy:  Follow-up in 1- 2 months or as needed.    Patient was encouraged to call/contact RD with any further questions.    Naina Doe, MS, RD, LD          Again,  thank you for allowing me to participate in the care of your patient.      Sincerely,    Naina Doe RD

## 2021-11-23 NOTE — PROGRESS NOTES
Radha is a 61 year old who is being evaluated via a billable telephone visit.      What phone number would you like to be contacted at? [phone]  How would you like to obtain your AVS? Mail a copy     Phone call duration:  46 minutes    During this virtual visit the patient is located in [place], patient verifies this as the location during the entirety of this visit.     [facility] Outpatient Medical Nutrition Therapy      Time Spent:  46 minutes  Session Type:  Follow up  Referring Physician:  Uriel Hung MD  Reason for RD Visit:   Hx UC s/p J pouch, nutritional counseling     Nutrition Assessment:  Patient is a 61 year old female with history that includes congenitally absent IVC, h/o multiple DVTs, lupus anticoagulant, barretts s/p prior fundoplication, CKD stage 4 and medically refractory UC s/p prior TAC w/ EI in 10/2019 followed by robotic completion proctectomy with J-pouch formation with diverting loop ileostomy in June 2021. Now s/p loop ileostomy takedown on 8/10/2021.     At initial visit, she stated that she has ongoing issues with frequent stooling and reported having about 5 bowel movements during the day and then 4-5 bowel movements overnight.  She stated that she is taking a lot of Imodium so now her stools are getting better and less frequent than previously had been and also soft but more formed now.  She continues to feel a lot of fatigue since her last surgery in August.  She used to be a weight  and followed more of a weightlifting diet with high amounts of protein up to 140 g/day.  Most of her meals were very high protein and little carbohydrates.  She has recently added in some carbohydrates along with protein foods for some more energy, but she wonders if she is getting enough protein as she is eating about a fourth of what she is to eat.  And her recent MD visit earlier this week MD told her okay for regular diet, and previously she had been following a lower fiber diet.   She is planning to slowly add some fiber back into her diet but currently mostly some carbohydrate foods that are lower in fiber along with some protein at meals.  She previously drank some protein drinks but not currently doing that.  Occasionally she will add some for protein drink as a creamer into her small amount of coffee.  Additionally with waking up 4-5 times at night to have a bowel movement it is disrupting her sleep as well.  She stated that during the day she sits all day at her desk and seems to have less issues with stool but when she lies down at night she needs to use the bathroom and notices worsening issues with needing to have bowel movements.  At recent MD visit, and he mentioned possibly be vitamin and iron labs needed and referred patient to RD to discuss nutrition tips based on her symptoms and recent J-pouch and surgeries.    At follow up visit today (11/23/21), she reported that her appetite is good and added more protein daily to meals but not regaining weight. Reported weight has been now staying mostly stable, with a little fluctuation. She is also losing hair again. She prefers to eat meals versus drinking meals and protein drinks, so includes more protein in meals now. She does add collagen powder into 3/4 cup of coffee in the morning as well. She is eating about 5 times per day. She is still getting up at night about 6 times to use the BM (4-5 for BMs and other times to urinate). During the day she estimated having approximately about 5 BMs, most stools are soft, some form now not watery. She stated that she has had issues with her kidneys d/t CKD, so has been getting fluids 2x/week as well now. She has been still eating a lower fiber diet as she wanted to be careful about her intake. She reported that she has a pouchoscopy next week as well to make sure everything is looking good/healing.     Patient Active Problem List   Diagnosis     Ulcerative pancolitis with complication (H)      "Follow-up examination after colorectal surgery     Chronic kidney disease, stage 3 (H)     Ventral hernia without obstruction or gangrene     Height:   Ht Readings from Last 1 Encounters:   11/11/21 1.753 m (5' 9\")     Weight: reported lost weight since her surgery and reported weighing herself at home and staying stable at 143-144 lbs (first thing in morning) and at infusion for fluids in afternoon was 147 lbs.  Wt Readings from Last 10 Encounters:   11/11/21 64.9 kg (143 lb)   10/13/21 69.8 kg (153 lb 12.8 oz)   10/05/21 69.5 kg (153 lb 4.8 oz)   10/04/21 69.6 kg (153 lb 6.4 oz)   08/18/21 74.2 kg (163 lb 8 oz)   08/10/21 71 kg (156 lb 8.4 oz)   07/26/21 71.5 kg (157 lb 9.6 oz)   07/26/21 71.7 kg (158 lb)   06/24/21 75.9 kg (167 lb 4.8 oz)   06/12/21 80.6 kg (177 lb 11.2 oz)     BMI: Estimated body mass index is 21.12 kg/m  as calculated from the following:    Height as of 11/11/21: 1.753 m (5' 9\").    Weight as of 11/11/21: 64.9 kg (143 lb).    Diet Recall:  (some usual/recent meals/snacks/beverages):  Reported eating about 5 times per day.  Meal Food    Breakfast english muffin with Greek caruso and cheese and cup dried cereal   Lunch 1-2 pc Chicken with 1 c white rice    Dinner Varies: if out chicken breast sometimes with cheese no bun and 4-5 fries or side soup with chicken or homemade chicken noodle soup with mushroom with 5 crackers or recently had some sushi (fish and rice) or occas pizza or every other month splits a steak or grilled pork chop with rice, sometimes baked potato.   Snacks Am snack: plain chicken or rice cake with PB and afternoon: ~2 oz/half of palm of hand of grilled chicken. Sometimes Pretzels at night otherwise none at night.    Beverages 3/4 gallon Water, 3/4 c coffee with collagen powder    Alcohol Intake ~2 drinks per week Sometimes has 1 cocktail at night, a couple nights per week to help feel sleepy and unwind at home.     Labs:    Last Comprehensive Metabolic Panel:  Sodium   Date " Value Ref Range Status   11/08/2021 140 133 - 144 mmol/L Final   06/24/2021 136 133 - 144 mmol/L Final     Potassium   Date Value Ref Range Status   11/08/2021 5.0 3.4 - 5.3 mmol/L Final   06/24/2021 4.9 3.4 - 5.3 mmol/L Final     Chloride   Date Value Ref Range Status   11/08/2021 109 94 - 109 mmol/L Final   06/24/2021 105 94 - 109 mmol/L Final     Carbon Dioxide   Date Value Ref Range Status   06/24/2021 28 20 - 32 mmol/L Final     Carbon Dioxide (CO2)   Date Value Ref Range Status   11/08/2021 25 20 - 32 mmol/L Final     Anion Gap   Date Value Ref Range Status   11/08/2021 6 3 - 14 mmol/L Final   06/24/2021 3 3 - 14 mmol/L Final     Glucose   Date Value Ref Range Status   11/08/2021 89 70 - 99 mg/dL Final   06/24/2021 132 (H) 70 - 99 mg/dL Final     Urea Nitrogen   Date Value Ref Range Status   11/08/2021 49 (H) 7 - 30 mg/dL Final   06/24/2021 29 7 - 30 mg/dL Final     Creatinine   Date Value Ref Range Status   11/08/2021 2.98 (H) 0.52 - 1.04 mg/dL Final   06/24/2021 2.80 (H) 0.52 - 1.04 mg/dL Final     GFR Estimate   Date Value Ref Range Status   11/08/2021 16 (L) >60 mL/min/1.73m2 Final     Comment:     As of July 11, 2021, eGFR is calculated by the CKD-EPI creatinine equation, without race adjustment. eGFR can be influenced by muscle mass, exercise, and diet. The reported eGFR is an estimation only and is only applicable if the renal function is stable.   06/24/2021 18 (L) >60 mL/min/[1.73_m2] Final     Comment:     Non  GFR Calc  Starting 12/18/2018, serum creatinine based estimated GFR (eGFR) will be   calculated using the Chronic Kidney Disease Epidemiology Collaboration   (CKD-EPI) equation.       Calcium   Date Value Ref Range Status   11/08/2021 9.1 8.5 - 10.1 mg/dL Final   06/24/2021 8.6 8.5 - 10.1 mg/dL Final     CBC RESULTS:   Recent Labs   Lab Test 08/18/21  1114   WBC 7.2   RBC 3.50*   HGB 10.6*   HCT 32.8*   MCV 94   MCH 30.3   MCHC 32.3   RDW 17.6*        Pertinent  Medications/vitamin and mineral supplements:   Taking imodium couple times per day.   Current Outpatient Medications   Medication     acetaminophen (TYLENOL) 500 MG tablet     BIOTIN PO     busPIRone (BUSPAR) 5 MG tablet     carvedilol (COREG) 25 MG tablet     cholecalciferol 25 MCG (1000 UT) TABS     dutasteride (AVODART) 0.5 MG capsule     EPINEPHrine (ANY BX GENERIC EQUIV) 0.3 MG/0.3ML injection 2-pack     escitalopram (LEXAPRO) 20 MG tablet     finasteride (PROSCAR) 5 MG tablet     ketoconazole (NIZORAL) 2 % external shampoo     loperamide (IMODIUM A-D) 2 MG tablet     nortriptyline (PAMELOR) 25 MG capsule     oxyCODONE (ROXICODONE) 5 MG tablet     warfarin ANTICOAGULANT (COUMADIN) 2 MG tablet     aspirin (ASA) 325 MG EC tablet     augmented betamethasone dipropionate (DIPROLENE) 0.05 % external lotion     loperamide (IMODIUM A-D) 2 MG tablet     No current facility-administered medications for this visit.     Food Allergies:  NKFA   Physical Activity:  Used to lift weight and has some light weight at home but she would like to start lifting again as she is able.    MALNUTRITION:  % Weight Loss:  > 10% in 6 months (severe malnutrition)  % Intake:  Decreased intake does not meet criteria for malnutrition   Subcutaneous Fat Loss:  Unable to assess  Muscle Loss:  Unable to assess  Fluid Retention:  None noted    Malnutrition Diagnosis: Patient does not meet two of the above criteria necessary for diagnosing malnutrition  In Context of:  Chronic illness or disease    Nutrition Diagnosis:      Food and nutrition related knowledge deficit related to lack of previous diet education/lack of complete recall of previous diet education as evidenced by pt report and interest in diet education/review.    Nutrition Prescription: smaller more frequent meals with adequate calories and protein.    Nutrition Intervention:    Nutrition Education/Counseling:  Provided diet education with tips and suggestions for increasing calories  and protein in a manner in which may be better tolerated. Recommended eating smaller more frequent meals, restart protein drinks and gradually incorporating more fiber slowly into diet as tolerated especially some soluble fiber sources. Reviewed some that she could include based on her food preferences and usual meals.    Educational Materials Provided:  NCM: soluble and insoluble fiber handout and AVS  Patient verbalized understanding of education provided. See Goals below.     Goals:  1. Continue eating at least 5 times per day, could increase to eating 6 times per day (3 meals and 3 snacks or 6 smaller meals per day) to help increase the amount of calories you are eating daily and prevent further weight loss.    --For example to eat 6 times per day, you could either consistently have an evening snack or could have 2 snacks in the afternoon.    2. At breakfast, can have a side of oatmeal or cream of rice cereal along with your usual breakfast english muffin, Marshallese caruso and cheese sandwich. The oatmeal and cream of rice cereal might be helpful in giving stooms some more form.    3. Re-try having some cooked green beans at your lunch and/or dinner meal. If using canned green beans, then buy no added salt options and drain the liquid from the can and rinse the green beans with fresh water before cooking (this helps reduce the sodium in the beans).    4. Re-try having some peeled apple/peeled apple slices or unsweetened applesauce along with a meal or snack. Could spread some peanut butter onto some apple slice for a snack too.    *note as you are gradually re-introducing foods and foods with more fiber, those foods with some soluble/more soluble fiber will likely be better tolerated.    Nutrition Monitoring and Evaluation: Will monitor adherence to nutrition recommendations at future RD visits.     Further Medical Nutrition Therapy:  Follow-up in 1- 2 months or as needed.    Patient was encouraged to call/contact  RD with any further questions.    Naina Doe, MS, RD, LD

## 2021-11-24 ENCOUNTER — TELEPHONE (OUTPATIENT)
Dept: GASTROENTEROLOGY | Facility: CLINIC | Age: 61
End: 2021-11-24
Payer: COMMERCIAL

## 2021-11-24 NOTE — TELEPHONE ENCOUNTER
Patient wants to know if it is possible to do this at the end of December due to insurance. I sent a message to Simi RINCON

## 2021-11-24 NOTE — TELEPHONE ENCOUNTER
Caller: Writer called, left vm    Procedure: pouchoscopy    Date, Location, and Surgeon of Procedure Cancelled: 12/1, [facility], Uriel Hung    Ordering Provider:Dr Rizo    Reason for cancel (please be detailed, any staff messages or encounters to note?): Endo scheduling,     Please reach out to pt to r/s procedure.  Per Dr. Hung pt should be seen 3 months from OV 10.4.2021.  See his note below to have pt scheduled in January.     Thank you,   Sosa Catalan RN   Pre-Assessment Endoscopy     ----- Message -----   From: Uriel Hung MD   Sent: 11/18/2021   3:07 PM CST   To: Minerva Alford RN, Kristin Delgado RN, *   Subject: RE: 12.1.2021                                     Yes, January. Only need 2 fleets. J-pouch patients do not need a full prep. Thx!         Rescheduled: NO, please reschedule in January if pt calls back     If rescheduled:    Date:    Location:    Note any change or update to original order/sedation:

## 2021-11-26 ENCOUNTER — TELEPHONE (OUTPATIENT)
Dept: GASTROENTEROLOGY | Facility: CLINIC | Age: 61
End: 2021-11-26
Payer: COMMERCIAL

## 2021-11-26 DIAGNOSIS — Z11.59 ENCOUNTER FOR SCREENING FOR OTHER VIRAL DISEASES: ICD-10-CM

## 2021-11-26 NOTE — TELEPHONE ENCOUNTER
11/25:  for patient letting her know Dec 15th is ok. Left scheduling line number to call back.    ----- Message from Uriel Hung MD sent at 11/25/2021 10:11 AM CST -----  Regarding: RE: Procedure  Has to be me. That's fine. If its a flex pouchoscopy she can either do 2 Fleet enemas or half the dose of golytely (her preference). Also, if she wants sedation, schedule at [facility]. If no sedation, okay to schedule in clinic. Thx!  ----- Message -----  From: Simi Galvan  Sent: 11/24/2021  12:33 PM CST  To: Uriel Palmer MD, #  Subject: FW: Procedure                                    Dr Hung,     Can the patient be seen in December due to insurance issues? The latest December appointment you have is Dec 15th.   Is that ok, or could she see another provider closer to the end of December?    Thank you,  Simi   ----- Message -----  From: Quiana Suarez  Sent: 11/24/2021  12:01 PM CST  To: Simi Galvan  Subject: Procedure                                        Good afternoon,     Radha was wondering if she can have this done at the end of December for insurance reasons instead of January.    Please let us know     Thank you,     Endoscopy Scheduling Team

## 2021-12-07 NOTE — TELEPHONE ENCOUNTER
"Per Dr. Hung pt can be seen on 12.15.2021.      \"Has to be me. That's fine. If its a flex pouchoscopy she can either do 2 Fleet enemas or half the dose of golytely (her preference). Also, if she wants sedation, schedule at [facility]. If no sedation, okay to schedule in clinic. Thx!\"    Sosa Catalan RN      "

## 2021-12-07 NOTE — TELEPHONE ENCOUNTER
Patient scheduled for flex pouchoscopy on 12.15.2021.     Covid test scheduled: 12.13.2021    Arrival time: 1330    Facility location: [facility]    Sedation type: MAC    Indication for procedure: J pouch surveillance     Referring provider:  Uriel Hung MD    Bowel prep recommendation: 2 fleet enemas or 1/2 Golytely prep    Pre visit planning completed.    Sosa Catalan RN

## 2021-12-08 NOTE — TELEPHONE ENCOUNTER
Pre assessment questions completed for upcoming flexible pouchoscopy procedure scheduled on 12.15.2021    COVID test scheduled 12.13.2021    Reviewed procedural arrival time 1330 and facility location [facility].    Designated  policy reviewed. Instructed to have someone stay 24 hours post procedure.     Anticoagulation/blood thinners? Warfarin; pt is not currently taking however per pt she will be told on 12.10.2021 if she is to resume.  Pt was given GI recommended hold x 5 days however pt is to consult with managing provider.     Electronic implanted devices? no    Reviewed 1/2 Golytely prep instructions with patient.  Prep instructions sent via letter.    Prep sent to [id number] - [place], [place] [address] AT [facility] [address] [facility] [address].  Instructions sent via Letter.    Patient verbalized understanding and had no questions or concerns at this time.    Sosa Catalan RN

## 2021-12-09 ENCOUNTER — TELEPHONE (OUTPATIENT)
Dept: GASTROENTEROLOGY | Facility: OUTPATIENT CENTER | Age: 61
End: 2021-12-09
Payer: COMMERCIAL

## 2021-12-09 NOTE — TELEPHONE ENCOUNTER
I sent the following message to Endoscopy Scheduling regarding my phone conversation with patient:    Khanh Swan, Endoscopy Scheduling,    I saw that patient still had not been moved up on 12/15 since Dr. Hung's afternoon [facility] session is closed, so I called patient and moved her up to 11:00 AM (from 2:30 PM). I told her to arrive at  10:00 AM.    Please call patient to go over her prep instructions again since it will be done at different time(s) now that her pouchoscopy is earlier in the day.    Thank-you!    Pura Coleman  Anni-op Coordinator  [facility]  Direct Phone: [phone]

## 2021-12-10 NOTE — TELEPHONE ENCOUNTER
Pt is not taking warfarin currently.  Pt will call [facility] Endoscopy if this changes.  Pt's arrival time has changed to 10am; RN re-reviewed prep instructions with pt.    Pt has no further questions or concerns.      Sosa Catalan RN

## 2021-12-13 ENCOUNTER — LAB (OUTPATIENT)
Dept: LAB | Facility: CLINIC | Age: 61
End: 2021-12-13
Payer: COMMERCIAL

## 2021-12-13 DIAGNOSIS — Z11.59 ENCOUNTER FOR SCREENING FOR OTHER VIRAL DISEASES: ICD-10-CM

## 2021-12-13 PROCEDURE — U0005 INFEC AGEN DETEC AMPLI PROBE: HCPCS

## 2021-12-13 PROCEDURE — U0003 INFECTIOUS AGENT DETECTION BY NUCLEIC ACID (DNA OR RNA); SEVERE ACUTE RESPIRATORY SYNDROME CORONAVIRUS 2 (SARS-COV-2) (CORONAVIRUS DISEASE [COVID-19]), AMPLIFIED PROBE TECHNIQUE, MAKING USE OF HIGH THROUGHPUT TECHNOLOGIES AS DESCRIBED BY CMS-2020-01-R: HCPCS

## 2021-12-14 LAB — SARS-COV-2 RNA RESP QL NAA+PROBE: NEGATIVE

## 2021-12-15 ENCOUNTER — PATIENT OUTREACH (OUTPATIENT)
Dept: SURGERY | Facility: CLINIC | Age: 61
End: 2021-12-15
Payer: COMMERCIAL

## 2021-12-15 ENCOUNTER — DOCUMENTATION ONLY (OUTPATIENT)
Dept: GASTROENTEROLOGY | Facility: OUTPATIENT CENTER | Age: 61
End: 2021-12-15
Attending: COLON & RECTAL SURGERY
Payer: COMMERCIAL

## 2021-12-15 DIAGNOSIS — Z09 FOLLOW-UP EXAMINATION AFTER COLORECTAL SURGERY: ICD-10-CM

## 2021-12-15 NOTE — PROGRESS NOTES
Patient Telephone Reminder Call    Date of call:  12/15/21  Phone numbers:  Home number on file [phone] (home)    Reached patient/confirmed appointment:  No - left message:   on voicemail  Appointment with:   Dr. Yemi Del Rio  Reason for visit:  PO

## 2021-12-15 NOTE — Clinical Note
12/15/2021         RE: Radha Jameson  [address]  [address]  [place] [address]        Dear Colleague,    Thank you for referring your patient, Radha Jameson, to the [facility]. Please see a copy of my visit note below.    No notes on file    Again, thank you for allowing me to participate in the care of your patient.        Sincerely,        Uriel Hung MD

## 2021-12-16 ENCOUNTER — PATIENT OUTREACH (OUTPATIENT)
Dept: SURGERY | Facility: CLINIC | Age: 61
End: 2021-12-16

## 2021-12-16 NOTE — PROGRESS NOTES
Patient cancelled appointment for today with Dr. Del Rio.  She was scheduled for a wound check.  She states that she met with Dr. Hung yesterday and he evaluated her incision.  She declined to reschedule.

## 2021-12-29 NOTE — TELEPHONE ENCOUNTER
[facility]: Nurse Triage Note  SITUATION/BACKGROUND                                                      Radha Jameson is a 60 year old female who calls with increased drainage from her ISAAC drain.    D  Allergies:   Allergies   Allergen Reactions     Warfarin Other (See Comments)     Internal bleeding reaction.     Wasp Venom Protein Angioedema and Other (See Comments)     Morphine Itching     Itching in throat per pt     Amlodipine Other (See Comments)     Edema         ASSESSMENT      *60 year old female s/p Cystoscopy with bilateral ureteral stent insertion and injection of indocyanine green,Laparoscopic robotic assisted pelvic dissection with mobilization of rectal stump,Ileostomy takedown,Laparoscopic robotic assisted ileal pouch-anal anastomosis,Flexible pouchoscopy with CO2 insufflation and Diverting loop ileostomy on 6/10/21   She calls today because her ISAAC drain just started to put out a lot of fluid.  SHe states she has been recording output since surgery  She reports her output as..  6/18-60 ml  6/19-10ml  6/20-0  6/21-0  6/22-35  Today 6/23   She has had 210 ml out within 1/2 hour   from 12-12:30.      She states it may be a little puffy on her side by the ISAAC drain site but denies swelling redness or warmth to the area   Denies fever or increased pain.  She states the color is still a pale yellow. She has been stripping the tube and has noticed some occasional clots.   She states overall she is recovering well       Recommended she continue to record output and I will forward to Colon/rectal team to call back with recommendations   RECOMMENDED DISPOSITION:  See above  Will comply with recommendation: Yes    If further questions/concerns or if symptoms do not improve, worsen or new symptoms develop, call your PCP or [phone] to talk with the Resident on call, as soon as possible.    Guideline used: post op problem  Telephone Triage Protocols for Nurses, Fifth Edition, Leidy  Alexandr Britton, RN, RN   Where Do You Want The Question To Include Opioid Counseling Located?: Case Summary Tab

## 2022-04-12 ENCOUNTER — PATIENT OUTREACH (OUTPATIENT)
Dept: SURGERY | Facility: CLINIC | Age: 62
End: 2022-04-12
Payer: COMMERCIAL

## 2022-05-18 ENCOUNTER — PRE VISIT (OUTPATIENT)
Dept: SURGERY | Facility: CLINIC | Age: 62
End: 2022-05-18
Payer: COMMERCIAL

## 2022-05-18 NOTE — TELEPHONE ENCOUNTER
Diagnosis, Referred by & from: Dx: Ulcerative pancolitis with rectal bleeding - Referred by Dr. Jeff Silva at [facility]    Appt date: 05/27/22   NOTES STATUS DETAILS   OFFICE NOTE from referring provider Internal 04/10/2021 Outside Records   OFFICE NOTE from other specialist Internal 10/04/2021 OV with Dr. Hung  08/25/2021 OV with Simi Finch  08/18/2021 OV with Simi Finch   DISCHARGE SUMMARY from hospital Internal 08/10/2021      DISCHARGE REPORT from the ER N/A      OPERATIVE REPORT Internal 08/10/2021  06/10/2021   MEDICATION LIST Internal    LABS     ANAL PAP N/A    BIOPSIES/PATHOLOGY RELATED TO DIAGNOSIS N/A      DIAGNOSTIC PROCEDURES     PFC TESTING (from the Pelvic floor center includes Manometry, PDNL, EMG, etc.) N/A    COLONOSCOPY N/A    UPPER ENDOSCOPY (EGD) Internal 08/04/2021 Outside Recs  07/30/2021 Outside Recs   FLEX SIGMOIDOSCOPY N/A    ERCP N/A    IMAGING (DISC & REPORT)      CT N/A    MRI Internal 06/20/2021 PELVIS   XRAY N/A     ULTRASOUND  (ENDOANAL/ENDORECTAL) N/A

## 2022-05-23 NOTE — PROGRESS NOTES
"Colon and Rectal Surgery Follow-up Clinic Note      RE: Radha COSTA Stalcar  : 1960  JU: 2022.    REASON FOR VISIT: Retained suture after ileostomy takedown in 2021.    DIAGNOSIS: 60 year old female with PMH of congenitally absent IVC, h/o multiple DVTs, lupus anticoagulant, barretts s/p prior fundoplication, CKD stage 4 and medically refractory UC s/p prior TAC w/ EI in 10/2019 followed by robotic completion proctectomy with J-pouch formation with diverting loop ileostomy in 2021. Now s/p loop ileostomy takedown on 8/10/2021.    INTERVAL HISTORY: Leidy returns with a persistent suture at her ileostomy takedown site.she underwent incisional hernia repair with mesh with Dr. Clifford epstein in 2021.  I also removed the Prolene suture from the ileostomy site but apparently there continues to be another one.  This causes her pain and irritation, especially with tight fitting clothes. Denies fevers or chills. Tolerating diet well. Having 2-3 nonbloody mushy to formed BM's during the day and 1-2 at night.  Denies fecal incontinence.  She is not taking any fiber supplements or Imodium.  She is only taking aspirin at this time and has frequent follow-up with her nephrologist and hematologist.     Physical Examination:  BP (!) 153/101 (BP Location: Left arm, Patient Position: Sitting, Cuff Size: Adult Regular)   Pulse 60   Ht 5' 9\"   Wt 163 lb 1.6 oz   LMP 2009   SpO2 99%   BMI 24.09 kg/m    Abdomen soft, nontender. Incisions with no evidence of infection or hernia. I do palpate a single suture right above the scar from her ileostomy takedown.  No evidence of infection or hernia.      ASSESSMENT  Doing well postop.  Palpable Prolene suture at old ileostomy takedown site.  No evidence of hernia or infection.    PLAN  1.  Schedule EUA, suture removal, and flexible pouchoscopy. Will need PAC. 2 Fleet enemas or 1 bottle of mag citrate prior to pouchoscopy. Hold aspirin 5 days before " surgery.    30 minutes spent on the date of the encounter doing chart review, history and exam, documentation and further activities as noted above.    Uriel Hung M.D., M.Sc.     Division of Colon and Rectal Surgery  [facility]    Referring Provider:  Jeff Silva MD  [facility]  [address]  [address]  [place] [address]      Primary Care Provider:  Hair Rivera     CC:  Sage Palma DO Hunt, Jennifer L, MD Jorge A. Granja, MD

## 2022-05-27 ENCOUNTER — TELEPHONE (OUTPATIENT)
Dept: SURGERY | Facility: CLINIC | Age: 62
End: 2022-05-27

## 2022-05-27 ENCOUNTER — OFFICE VISIT (OUTPATIENT)
Dept: SURGERY | Facility: CLINIC | Age: 62
End: 2022-05-27
Payer: COMMERCIAL

## 2022-05-27 VITALS
WEIGHT: 163.1 LBS | SYSTOLIC BLOOD PRESSURE: 153 MMHG | BODY MASS INDEX: 24.16 KG/M2 | HEART RATE: 60 BPM | DIASTOLIC BLOOD PRESSURE: 101 MMHG | HEIGHT: 69 IN | OXYGEN SATURATION: 99 %

## 2022-05-27 DIAGNOSIS — K51.019 ULCERATIVE PANCOLITIS WITH COMPLICATION (H): ICD-10-CM

## 2022-05-27 DIAGNOSIS — T81.89XA SUTURE GRANULOMA, INITIAL ENCOUNTER: Primary | ICD-10-CM

## 2022-05-27 PROCEDURE — 99214 OFFICE O/P EST MOD 30 MIN: CPT | Performed by: COLON & RECTAL SURGERY

## 2022-05-27 NOTE — NURSING NOTE
"Chief Complaint   Patient presents with     Follow Up       Vitals:    05/27/22 0928   BP: (!) 153/101   BP Location: Left arm   Patient Position: Sitting   Cuff Size: Adult Regular   Pulse: 60   SpO2: 99%   Weight: 74 kg (163 lb 1.6 oz)   Height: 1.753 m (5' 9\")       Body mass index is 24.09 kg/m .      HEATHER Haque    "

## 2022-05-27 NOTE — PATIENT INSTRUCTIONS
Pura will call you to schedule your surgery  Appointments you will need to do beforehand: COVID test, pre op physical  You will need to do 2 fleet enemas the day of surgery. We will mail you the instructions     MILY Foster [phone]    Clinic Fax Number [phone]    Surgery Scheduling [phone]    My Chart is available 24 hours a day and is a secure way to access your records and communicate with your care team.  I strongly recommend signing up if you haven't already done so, if you are comfortable with computers.  If you would like to inquire about this or are having problems with My Chart access, you may call [phone] or go online at [email or web address].  Please allow at least 24 hours for a response and extra time on weekends and Holidays.

## 2022-05-27 NOTE — LETTER
"    2022         RE: Radha Jameson  [address]  [address]  [place] [address]        Dear Colleague,    Thank you for referring your patient, Radha Jameson, to the [facility] [place]. Please see a copy of my visit note below.    Colon and Rectal Surgery Follow-up Clinic Note      RE: Radha Jameson  : 1960  JU: 2022.    REASON FOR VISIT: Retained suture after ileostomy takedown in 2021.    DIAGNOSIS: 60 year old female with PMH of congenitally absent IVC, h/o multiple DVTs, lupus anticoagulant, barretts s/p prior fundoplication, CKD stage 4 and medically refractory UC s/p prior TAC w/ EI in 10/2019 followed by robotic completion proctectomy with J-pouch formation with diverting loop ileostomy in 2021. Now s/p loop ileostomy takedown on 8/10/2021.    INTERVAL HISTORY: Leidy returns with a persistent suture at her ileostomy takedown site.she underwent incisional hernia repair with mesh with Dr. Clifford epstein in 2021.  I also removed the Prolene suture from the ileostomy site but apparently there continues to be another one.  This causes her pain and irritation, especially with tight fitting clothes. Denies fevers or chills. Tolerating diet well. Having 2-3 nonbloody mushy to formed BM's during the day and 1-2 at night.  Denies fecal incontinence.  She is not taking any fiber supplements or Imodium.  She is only taking aspirin at this time and has frequent follow-up with her nephrologist and hematologist.     Physical Examination:  BP (!) 153/101 (BP Location: Left arm, Patient Position: Sitting, Cuff Size: Adult Regular)   Pulse 60   Ht 5' 9\"   Wt 163 lb 1.6 oz   LMP 2009   SpO2 99%   BMI 24.09 kg/m    Abdomen soft, nontender. Incisions with no evidence of infection or hernia. I do palpate a single suture right above the scar from her ileostomy takedown.  No evidence of infection or hernia.      ASSESSMENT  Doing well postop.  " Palpable Prolene suture at old ileostomy takedown site.  No evidence of hernia or infection.    PLAN  1.  Schedule EUA, suture removal, and flexible pouchoscopy. Will need PAC. 2 Fleet enemas or 1 bottle of mag citrate prior to pouchoscopy. Hold aspirin 5 days before surgery.    30 minutes spent on the date of the encounter doing chart review, history and exam, documentation and further activities as noted above.    Uriel Hung M.D., M.Sc.     Division of Colon and Rectal Surgery  [facility]    Referring Provider:  Jeff Silva MD  [facility]  [address]  [address]  [place] [address]      Primary Care Provider:  Hair Rivera     CC:  Sage Palma DO Hunt, Jennifer L, MD Jorge A. Granja, MD      Again, thank you for allowing me to participate in the care of your patient.        Sincerely,        Uriel Hung MD

## 2022-05-27 NOTE — TELEPHONE ENCOUNTER
Case Request received to schedule:    Patient Name: Radha Jameson   MRN: [id number]   Case#: [id number]   Surgeon(s) and Role:      * Uriel Hung MD - Primary   Date requested: * No dates entered *   [facility]  Procedure(s):   EXAM UNDER ANESTHESIA, suture removal, pouchoscopy    Additional Instructions for the Case  Multi-surgeon case:  no  Time in minutes:  30  Anesthesia: MAC  Prep: 2 fleets the morning of procedure if patient can tolerate; otherwise 1 bottle of Magnesium Citrate the evening before surgery (clear liquids all day, day before surgery, if this is the chosen option)  Pre-Op: PAC  Labs: no  WOC: no  Special equipment: no  Special Instructions: no    Schedule with Simi Chen NP 2-3 weeks after surgery.    On 5/27/2022:  Spoke with patient via phone, she says that fleet enemas hurt too much, so she would prefer to do the clear liquids all day on day before surgery with 1 bottle of magnesium citrate the evening before surgery instead.    Completed the following scheduling, then sent Surgery Packet to patient via Mail including related scheduling information and prep instructions:     Required: Yes, you will need a  18 years or older to drive you home from your procedure as well as stay with you for 24 hours after your procedure    Surgery: Exam under anesthesia, anus, suture removal, pouchoscopy    Date: Tuesday June 21, 2022  Surgeon: Dr. Uriel Hung    Time: You will receive a call 1-3 days prior to your surgery with your finalized surgery and arrival time.     Location: [facility] [address] [address]  [phone]      Upcoming Related Appointments:       Jun 08, 2022 11:45 AM     Pre-op History & Physical  (Arrive by 11:30 AM)  PAC EVALUATION with Merari Nazario PA-C  [facility] Preoperative Assessment Center [place] ([facility] ) [phone]    VIDEO VISIT   Jun 17, 2022 11:00 AM  Pre-procedure Covid PCR Test with [facility]  [facility] ([facility] [phone]    [address] [facility] [address]   Jul 06, 2022  1:30 PM  (Arrive by 1:15 PM)  Post-Op with LEVI Paez CNP  [facility] Colon and Rectal [facility] [place] ([facility] [place] ) [phone]    [address] [address]        Preparation: Clear liquids, Magnesium Citrate (see \"Day Before Surgery\")    Pura Coleman  Anni-op Coordinator  [facility]  Direct Phone: [phone]    "

## 2022-05-31 ENCOUNTER — PATIENT OUTREACH (OUTPATIENT)
Dept: SURGERY | Facility: CLINIC | Age: 62
End: 2022-05-31
Payer: COMMERCIAL

## 2022-05-31 NOTE — TELEPHONE ENCOUNTER
FUTURE VISIT INFORMATION      SURGERY INFORMATION:    Date: 22    [facility]    Surgeon:  Uriel Hung MD    Anesthesia Type:  MAC    Procedure: EXAM UNDER ANESTHESIA, suture removal, pouchoscopy    Consult: ov     RECORDS REQUESTED FROM:       Primary Care Provider: Hair Rivera MBBS - [facility]    Most recent EKG+ Tracin20- Sarina

## 2022-06-06 NOTE — PROGRESS NOTES
Preoperative Assessment Center Medication History Note    Medication history completed on June 6, 2022 by this writer. See Epic admission navigator for prior to admission medications. Operating room staff will still need to confirm medications and last dose information on day of surgery.     Medication history interview sources  Patient interview: Yes  Care Everywhere records: Yes  Surescripts pharmacy refill records: Yes  Other (if applicable):     Changes made to PTA medication list  Added: viviscal.   Deleted: tylenol, vitamin D, warfarin (completed for blood clots), finasteride, oxycodone,   Changed: coreg dose/sig,     Additional medication history information (including reliability of information, actions taken by pharmacist):    -- No recent (within 30 days) course of antibiotics  -- No recent (within 30 days) course of systemic steroids  -- Reports not being on blood thinning medications except for aspirin 325 mg daily.    -- Declines being on any other prescription or over-the-counter medications    Prior to Admission medications    Medication Sig Last Dose Taking? Auth Provider   aspirin (ASA) 325 MG EC tablet Take 325 mg by mouth every evening Taking Yes Diann Kennedy PA-C   augmented betamethasone dipropionate (DIPROLENE) 0.05 % external lotion Apply topically 2 times daily scalp Taking Yes Reported, Patient   BIOTIN PO Take 1 tablet by mouth 2 times daily Taking Yes Unknown, Entered By History   busPIRone (BUSPAR) 5 MG tablet Take 10 mg by mouth 2 times daily Taking Yes Reported, Patient   carvedilol (COREG) 25 MG tablet Take 12.5 mg by mouth 2 times daily (with meals) Taking Yes Reported, Patient   dutasteride (AVODART) 0.5 MG capsule Take 0.5 mg by mouth 2 times daily  Taking Yes Reported, Patient   EPINEPHrine (ANY BX GENERIC EQUIV) 0.3 MG/0.3ML injection 2-pack once as needed for anaphylaxis  Taking Yes Reported, Patient   escitalopram (LEXAPRO) 20 MG tablet Take 20 mg by mouth every  morning Taking Yes Reported, Patient   ketoconazole (NIZORAL) 2 % external shampoo daily as needed  Taking Yes Reported, Patient   loperamide (IMODIUM A-D) 2 MG tablet Take 2 tablets (4 mg) by mouth 3 times daily as needed for diarrhea Taking Yes Uriel Hung MD   NONFORMULARY Viviscal - for hair - supplement - 1 tablet twice daily Taking Yes Unknown, Entered By History   nortriptyline (PAMELOR) 25 MG capsule Take 25 mg by mouth At Bedtime  Taking Yes Reported, Patient   loperamide (IMODIUM A-D) 2 MG tablet Take 1 tablet before bedtime. Can increase to 2 tablets before bedtime  Patient taking differently: 2 times daily as needed Take 1 tablet before bedtime. Can increase to 2 tablets before bedtime   Simi Finch APRN CNP   polyethylene glycol (GOLYTELY) 236 g suspension At 7:30 AM on the day of the exam:  Start drinking the Golytely jug. Drink one 8-ounce glass every 15 minutes until the jug is half empty.  Discard remainder of container.  Patient not taking: No sig reported Not Taking  Uriel Hung MD        Medication history completed by: Nam Limon [facility]

## 2022-06-08 ENCOUNTER — ANESTHESIA EVENT (OUTPATIENT)
Dept: SURGERY | Facility: AMBULATORY SURGERY CENTER | Age: 62
End: 2022-06-08
Payer: COMMERCIAL

## 2022-06-08 ENCOUNTER — VIRTUAL VISIT (OUTPATIENT)
Dept: SURGERY | Facility: CLINIC | Age: 62
End: 2022-06-08
Payer: COMMERCIAL

## 2022-06-08 ENCOUNTER — PRE VISIT (OUTPATIENT)
Dept: SURGERY | Facility: CLINIC | Age: 62
End: 2022-06-08
Payer: COMMERCIAL

## 2022-06-08 DIAGNOSIS — Z01.818 PRE-OP EVALUATION: Primary | ICD-10-CM

## 2022-06-08 PROCEDURE — 99215 OFFICE O/P EST HI 40 MIN: CPT | Mod: GT | Performed by: PHYSICIAN ASSISTANT

## 2022-06-08 ASSESSMENT — PAIN SCALES - GENERAL: PAINLEVEL: NO PAIN (0)

## 2022-06-08 ASSESSMENT — LIFESTYLE VARIABLES: TOBACCO_USE: 0

## 2022-06-08 NOTE — H&P
Pre-Operative H & P     CC:  Preoperative exam to assess for increased cardiopulmonary risk while undergoing surgery and anesthesia.    Date of Encounter: 6/8/2022  Primary Care Physician:  Hair Rivera     Reason for visit:   Encounter Diagnosis   Name Primary?     Pre-op evaluation Yes       HPI  Radha Jameson is a 61 year old female who presents for pre-operative H & P in preparation for  Procedure Information     Case: [id number] Date/Time: 06/21/22 1330    Procedure: exam under anesthesia, suture removal, pouchoscopy (N/A Rectum)    Anesthesia type: Monitor Anesthesia Care    Diagnosis:       Suture granuloma, initial encounter [T81.89XA]      Ulcerative pancolitis with complication (H) [K51.019]    Pre-op diagnosis:       Suture granuloma, initial encounter [T81.89XA]      Ulcerative pancolitis with complication (H) [K51.019]    Location: [place] [address] / [facility]    Providers: Uriel Hung MD          Patient is being evaluated for comorbid conditions of hypertension, h/o DVT, UC, CRI, depression, anxiety    Ms. Jameson has a history of medically refractory UC s/p TAC with EI 10/2019 followed by completion proctectomy 6/2021. She is now s/p loop ileostomy takedown 8/2021. She now has a persistent suture at her ileostomy takedown site. She was recently seen by Dr. Hung and is now scheduled for the above procedure.     History is obtained from the patient and chart review    Hx of abnormal bleeding or anti-platelet use:  mg    Menstrual history: Patient's last menstrual period was 01/01/2009.     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     Ulcerative pancolitis with complication (H)      Umbilical hernia without obstruction and without gangrene        Past Surgical History  Past Surgical History:   Procedure Laterality Date     BILATERAL OOPHORECTOMY  2009     BREAST SURGERY  2003    reconstruction      SECTION       COLECTOMY TOTAL  2019     COLONOSCOPY       CV FEMORAL ANGIOGRAM       DAVINCI COLECTOMY N/A 6/10/2021    Procedure: ROBOT-ASSISTED  ileal pouch-anal anastomosis, diverting loop ileostomy;  Surgeon: Uriel Hung MD;  Location: [facility] OR     EGD      , 2017     EXAM UNDER ANESTHESIA REMOVE SUTURE / STAPLE N/A 2021    Procedure: EXAM UNDER ANESTHESIA, WITH SUTURE REMOVAL;  Surgeon: Yemi Del Rio MD;  [place] [facility]     GASTRIC FUNDOPLICATION  2017     HERNIORRHAPHY VENTRAL N/A 2021    Procedure: HERNIORRHAPHY, VENTRAL, OPEN;  Surgeon: Yemi Del Rio MD;  [place] [facility] [place]     HIATAL HERNIA REPAIR      LINX     INSERT STENT URETER N/A 6/10/2021    Procedure: INSERTION, STENT, URETER, PREOPERATIVE;  Surgeon: Joaquin Dioxn MD;  Location: [facility] OR     OVARIAN CYST REMOVAL       PELVIC LAPAROSCOPY       Removal of LINX       RLE Venogram/thrombolysis Right 05/15/2020     SIGMOIDOSCOPY FLEXIBLE N/A 6/10/2021    Procedure: SIGMOIDOSCOPY, FLEXIBLE;  Surgeon: Uriel Hung MD;  Location: [facility] OR     TAKEDOWN ILEOSTOMY N/A 8/10/2021    Procedure: CLOSURE, ILEOSTOMY;  Surgeon: Uriel Hung MD;  Location: [facility] OR       Prior to Admission Medications  Current Outpatient Medications   Medication Sig Dispense Refill     aspirin (ASA) 325 MG EC tablet Take 325 mg by mouth every evening       augmented betamethasone dipropionate (DIPROLENE) 0.05 % external lotion Apply topically 2 times daily scalp       BIOTIN  PO Take 1 tablet by mouth 2 times daily       busPIRone (BUSPAR) 5 MG tablet Take 10 mg by mouth 2 times daily       carvedilol (COREG) 25 MG tablet Take 12.5 mg by mouth 2 times daily (with meals)       dutasteride (AVODART) 0.5 MG capsule Take 0.5 mg by mouth 2 times daily        EPINEPHrine (ANY BX GENERIC EQUIV) 0.3 MG/0.3ML injection 2-pack once as needed for anaphylaxis        escitalopram (LEXAPRO) 20 MG tablet Take 20 mg by mouth every morning       ketoconazole (NIZORAL) 2 % external shampoo daily as needed        loperamide (IMODIUM A-D) 2 MG tablet Take 2 tablets (4 mg) by mouth 3 times daily as needed for diarrhea 60 tablet 5     NONFORMULARY Viviscal - for hair - supplement - 1 tablet twice daily       nortriptyline (PAMELOR) 25 MG capsule Take 25 mg by mouth At Bedtime        loperamide (IMODIUM A-D) 2 MG tablet Take 1 tablet before bedtime. Can increase to 2 tablets before bedtime (Patient taking differently: 2 times daily as needed Take 1 tablet before bedtime. Can increase to 2 tablets before bedtime) 60 tablet 3     polyethylene glycol (GOLYTELY) 236 g suspension At 7:30 AM on the day of the exam:  Start drinking the Golytely jug. Drink one 8-ounce glass every 15 minutes until the jug is half empty.  Discard remainder of container. (Patient not taking: No sig reported) 4000 mL 0       Allergies  Allergies   Allergen Reactions     Warfarin Other (See Comments)     Internal bleeding reaction.     Wasp Venom Protein Angioedema and Other (See Comments)     Morphine Itching     Itching in throat per pt     Amlodipine Other (See Comments)     Edema         Social History  Social History     Socioeconomic History     Marital status:      Spouse name: Not on file     Number of children: Not on file     Years of education: Not on file     Highest education level: Not on file   Occupational History     Not on file   Tobacco Use     Smoking status: Never Smoker     Smokeless tobacco: Never Used    Substance and Sexual Activity     Alcohol use: Yes     Comment: couple coctails most days     Drug use: Never     Sexual activity: Not on file   Other Topics Concern     Not on file   Social History Narrative     Not on file     Social Determinants of Health     Financial Resource Strain: Not on file   Food Insecurity: Not on file   Transportation Needs: Not on file   Physical Activity: Not on file   Stress: Not on file   Social Connections: Not on file   Intimate Partner Violence: Not on file   Housing Stability: Not on file       Family History  Family History   Problem Relation Age of Onset     Matson's esophagus Father      Diabetes Mother      Hypertension Mother      Multiple myeloma Mother      Ulcerative Colitis Mother      Anesthesia Reaction Mother         post op delirium     Cervical Cancer Sister      Diabetes Type 2  Brother      Asthma Maternal Grandmother      Breast Cancer Maternal Grandmother      Breast Cancer Paternal Grandmother      Clotting Disorder No family hx of      Bleeding Disorder No family hx of        Review of Systems  The complete review of systems is negative other than noted in the HPI or here.   Anesthesia Evaluation   Pt has had prior anesthetic.     History of anesthetic complications  - PONV.  one episode of low BP intraoperatively.    ROS/MED HX  ENT/Pulmonary:  - neg pulmonary ROS  (-) tobacco use   Neurologic:  - neg neurologic ROS     Cardiovascular:     (+) hypertension-----Taking blood thinners Previous cardiac testing   Echo: Date: Results:    Stress Test: Date: Results:    ECG Reviewed: Date: 7/2020 Results:  Sinus bradycardia   Cath: Date: Results:      METS/Exercise Tolerance: >4 METS Comment: Gym for weight lifting, walking   Hematologic:     (+) History of blood clots, pt is anticoagulated, history of blood transfusion, no previous transfusion reaction,     Musculoskeletal:  - neg musculoskeletal ROS     GI/Hepatic: Comment: Matson's esophagus s/p surgery  [facility]       Renal/Genitourinary:     (+) renal disease, type: CRI, Pt does not require dialysis,     Endo:  - neg endo ROS  (-) chronic steroid usage   Psychiatric/Substance Use:     (+) psychiatric history anxiety and depression     Infectious Disease:  - neg infectious disease ROS     Malignancy:  - neg malignancy ROS     Other:            Virtual visit -  No vitals were obtained    Physical Exam  Constitutional: Awake, alert, cooperative, no apparent distress, and appears stated age.  HENT: Normocephalic  Respiratory: non labored breathing   Neurologic: Awake, alert, oriented to name, place and time.   Neuropsychiatric: Calm, cooperative. Normal affect.      Prior Labs/Diagnostic Studies   All labs and imaging personally reviewed     EKG/ stress test - if available please see in ROS above   No results found.  No flowsheet data found.      The patient's records and results personally reviewed by this provider.     Outside records reviewed from: Care Everywhere      Assessment      Radha Jameson is a 61 year old female seen as a PAC referral for risk assessment and optimization for anesthesia.    Plan/Recommendations  Pt will be optimized for the proposed procedure.  See below for details on the assessment, risk, and preoperative recommendations    NEUROLOGY  - No history of TIA, CVA or seizure  -Post Op delirium risk factors:  No risk identified    ENT  - No current airway concerns.  Will need to be reassessed day of surgery.  Mallampati: Unable to assess  TM: Unable to assess    CARDIAC  - Hypertension  Well controlled- BP was 112/82 at most recent PCP visit 6/2  -congenitally absent IVC  -denies cardiac symptoms   - METS (Metabolic Equivalents)  Patient performs 4 or more METS exercise without symptoms            Total Score: 0      RCRI-Low risk: Class 2 0.9% complication rate            Total Score: 1    RCRI: Elevated Creatinine        PULMONARY  NICKI Low Risk            Total Score: 2    NICKI: Hypertension    NICKI:  "Over 50 ys old      - Denies asthma or inhaler use  - Tobacco History      History   Smoking Status     Never Smoker   Smokeless Tobacco     Never Used       GI  PONV High Risk  Total Score: 3           1 AN PONV: Pt is Female    1 AN PONV: Patient is not a current smoker    1 AN PONV: Patient has history of PONV      -ghosh's esophagus s/p surgery  -[facility] s/p total colectomy  Now with suture granuloma with the above procedure planned     /RENAL  - Baseline Creatinine 2.1.  Stable. Follows with nephrology, most recently seen 5/12 and stable with recommendation to follow up in 6 months. No dialysis.     ENDOCRINE    - BMI: Estimated body mass index is 24.09 kg/m  as calculated from the following:    Height as of 5/27/22: 1.753 m (5' 9\").    Weight as of 5/27/22: 74 kg (163 lb 1.6 oz).  Healthy Weight (BMI 18.5-24.9)  - No history of Diabetes Mellitus    HEME  VTE Low Risk 0.5%            Total Score: 4    VTE: Greater than 59 yrs old    VTE: Pt history of VTE      -lupus coagulant s/p multiple clots. Follows with Dr. Palma at [facility]. Using  mg. Surgeon has requested a 5 day hold. Will message Dr. Palma to see if he is in agreement with this plan.    ACCESS  reports h/o difficult IV access    The patient is optimized for their procedure. AVS with information on surgery time/arrival time, meds and NPO status given by nursing staff. No further diagnostic testing indicated.    Please refer to the physical examination documented by the anesthesiologist in the anesthesia record on the day of surgery.    Video-Visit Details    Type of service:  Video Visit    Patient verbally consented to video service today: YES    Video Start Time: 1146  Video End Time (time video stopped): 1204    Originating Location (pt. Location): Home    Distant Location (provider location): home    Mode of Communication:  Video Conference via [employer]  On the day of service:     Prep time: 19 minutes  Visit time: 18 " minutes  Documentation time: 9 minutes  ------------------------------------------  Total time: 46 minutes      Merari Nazario PA-C  Preoperative Assessment Center  [facility]  Phone: [phone]  Fax: [phone]

## 2022-06-08 NOTE — PROGRESS NOTES
Radha is a 61 year old who is being evaluated via a billable video visit.      How would you like to obtain your AVS? Mail a copy      HPI         Review of Systems         Objective    Vitals - Patient Reported  Pain Score: No Pain (0)        Physical Exam       ROLAND Carmona LPN

## 2022-06-08 NOTE — PATIENT INSTRUCTIONS
Preparing for Your Surgery      Name:  Radha Jameson   MRN:  [id number]   :  1960   Today's Date:  2022         Arriving for surgery:  Surgery date:  22  Arrival time:  12 Noon    Restrictions due to COVID 19:    Effective 2022  1 visitor may accompany patient and wait in the surgery waiting room  All visitors must wear a mask and social distance      parking is available for anyone with mobility limitations or disabilities. (Monday- Friday 7 am- 5 pm)    Please come to:    [facility]  [address] [place] [phone]    Please check in on the 5th floor at the Ambulatory Surgery Center       What can I eat or drink?    -  You may eat and drink normally until 8 hours before surgery. (Until 5:30 am)  -  You may have clear liquids up to 4 hours before surgery. (Until 9:30 am)  Examples of clear liquids:  Water  Clear broth  Juices (apple, white grape, white cranberry  and cider) without pulp  Noncarbonated, powder based beverages  (lemonade and Stevo-Aid)  Sodas (Sprite, 7-Up, ginger ale and seltzer)  Coffee or tea (without milk or cream)  Gatorade    --No alcohol for at least 24 hours before surgery    Which medicines can I take?    Hold Aspirin for 5 days before surgery unless you hear otherwise from our Team after talking to Hematology. Take the last dose of Aspirin on 6-15-22, and then hold until surgery, unless you hear otherwise from our Team after talking to Hematology.  Hold Multivitamins for 7 days before surgery.  Hold Supplements for 7 days before surgery. Take the last Viviscal supplement on 22, and then hold until surgery.  Hold Ibuprofen (Advil, Motrin) for 1 day before surgery--unless otherwise directed by surgeon.  Hold Naproxen (Aleve) for 4 days before surgery.    -  DO NOT take the following medications the day of surgery:  Biotin, Imodium A-D,     -  PLEASE TAKE the following medications the day of surgery   Buspirone (Buspar),  Carvedilol (Coreg), Dutasteride (Avodart), Escitalopram (Lexapro),   Acetaminophen (Tylenol) if needed    Do 2 Fleets enemas prior to arrival.    How do I prepare myself?  - Please take 2 showers before surgery using Scrubcare or Hibiclens soap.    Use this soap only from the neck to your toes.     Leave the soap on your skin for one minute--then rinse thoroughly.      You may use your own shampoo and conditioner; no other hair products.   - Please remove all jewelry and body piercings.  - No lotions, deodorants or fragrance.  - No makeup or fingernail polish.   - Bring your ID and insurance card.    -If you have a Deep Brain Stimulator, a Spinal Cord Stimulator or any implanted Neuro Device you must bring the remote to the Surgery Center         ALL PATIENTS ARE REQUIRED TO HAVE A RESPONSIBLE ADULT TO DRIVE AND BE IN ATTENDANCE WITH THEM FOR 24 HOURS FOLLOWING SURGERY       Questions or Concerns:    -For questions regarding the day of surgery please contact the Ambulatory Surgery Center at [phone].    -If you have health changes between today and your surgery please contact your surgeon.     - For questions after surgery please contact your surgeon's office     For questions after surgery contact your surgeon

## 2022-06-09 ENCOUNTER — TELEPHONE (OUTPATIENT)
Dept: SURGERY | Facility: CLINIC | Age: 62
End: 2022-06-09
Payer: COMMERCIAL

## 2022-06-09 NOTE — TELEPHONE ENCOUNTER
"Received a message from [facility] - per Dr. Sage Fernández, \"okay to hold Aspirin for 5 days and no other recommendations.\"  Blank Carter RN   "

## 2022-06-17 ENCOUNTER — LAB (OUTPATIENT)
Dept: LAB | Facility: CLINIC | Age: 62
End: 2022-06-17
Payer: COMMERCIAL

## 2022-06-17 DIAGNOSIS — Z11.59 ENCOUNTER FOR SCREENING FOR OTHER VIRAL DISEASES: ICD-10-CM

## 2022-06-17 PROCEDURE — U0003 INFECTIOUS AGENT DETECTION BY NUCLEIC ACID (DNA OR RNA); SEVERE ACUTE RESPIRATORY SYNDROME CORONAVIRUS 2 (SARS-COV-2) (CORONAVIRUS DISEASE [COVID-19]), AMPLIFIED PROBE TECHNIQUE, MAKING USE OF HIGH THROUGHPUT TECHNOLOGIES AS DESCRIBED BY CMS-2020-01-R: HCPCS

## 2022-06-17 PROCEDURE — U0005 INFEC AGEN DETEC AMPLI PROBE: HCPCS

## 2022-06-18 LAB — SARS-COV-2 RNA RESP QL NAA+PROBE: NEGATIVE

## 2022-06-21 ENCOUNTER — ANESTHESIA (OUTPATIENT)
Dept: SURGERY | Facility: AMBULATORY SURGERY CENTER | Age: 62
End: 2022-06-21
Payer: COMMERCIAL

## 2022-06-21 ENCOUNTER — HOSPITAL ENCOUNTER (OUTPATIENT)
Facility: AMBULATORY SURGERY CENTER | Age: 62
Discharge: HOME | End: 2022-06-21
Attending: COLON & RECTAL SURGERY
Payer: COMMERCIAL

## 2022-06-21 VITALS
HEART RATE: 54 BPM | RESPIRATION RATE: 16 BRPM | WEIGHT: 155 LBS | OXYGEN SATURATION: 100 % | TEMPERATURE: 98 F | SYSTOLIC BLOOD PRESSURE: 126 MMHG | DIASTOLIC BLOOD PRESSURE: 83 MMHG | HEIGHT: 69 IN | BODY MASS INDEX: 22.96 KG/M2

## 2022-06-21 DIAGNOSIS — T81.89XA SUTURE GRANULOMA, INITIAL ENCOUNTER: ICD-10-CM

## 2022-06-21 PROCEDURE — 10121 INC&RMVL FB SUBQ TISS COMP: CPT | Performed by: COLON & RECTAL SURGERY

## 2022-06-21 PROCEDURE — 44385 ENDOSCOPY OF BOWEL POUCH: CPT

## 2022-06-21 PROCEDURE — 10121 INC&RMVL FB SUBQ TISS COMP: CPT

## 2022-06-21 PROCEDURE — 44385 ENDOSCOPY OF BOWEL POUCH: CPT | Performed by: COLON & RECTAL SURGERY

## 2022-06-21 RX ORDER — LABETALOL HYDROCHLORIDE 5 MG/ML
10 INJECTION, SOLUTION INTRAVENOUS
Status: DISCONTINUED | OUTPATIENT
Start: 2022-06-21 | End: 2022-06-21 | Stop reason: HOSPADM

## 2022-06-21 RX ORDER — ACETAMINOPHEN 325 MG/1
975 TABLET ORAL 4 TIMES DAILY
Qty: 100 TABLET | Refills: 0 | Status: SHIPPED | OUTPATIENT
Start: 2022-06-21 | End: 2022-06-29

## 2022-06-21 RX ORDER — SODIUM CHLORIDE, SODIUM LACTATE, POTASSIUM CHLORIDE, CALCIUM CHLORIDE 600; 310; 30; 20 MG/100ML; MG/100ML; MG/100ML; MG/100ML
INJECTION, SOLUTION INTRAVENOUS CONTINUOUS
Status: DISCONTINUED | OUTPATIENT
Start: 2022-06-21 | End: 2022-06-22 | Stop reason: HOSPADM

## 2022-06-21 RX ORDER — HYDRALAZINE HYDROCHLORIDE 20 MG/ML
2.5-5 INJECTION INTRAMUSCULAR; INTRAVENOUS EVERY 10 MIN PRN
Status: DISCONTINUED | OUTPATIENT
Start: 2022-06-21 | End: 2022-06-21 | Stop reason: HOSPADM

## 2022-06-21 RX ORDER — ONDANSETRON 4 MG/1
4 TABLET, ORALLY DISINTEGRATING ORAL EVERY 30 MIN PRN
Status: DISCONTINUED | OUTPATIENT
Start: 2022-06-21 | End: 2022-06-22 | Stop reason: HOSPADM

## 2022-06-21 RX ORDER — FENTANYL CITRATE 50 UG/ML
25 INJECTION, SOLUTION INTRAMUSCULAR; INTRAVENOUS EVERY 5 MIN PRN
Status: DISCONTINUED | OUTPATIENT
Start: 2022-06-21 | End: 2022-06-21 | Stop reason: HOSPADM

## 2022-06-21 RX ORDER — ACETAMINOPHEN 325 MG/1
975 TABLET ORAL ONCE
Status: COMPLETED | OUTPATIENT
Start: 2022-06-21 | End: 2022-06-21

## 2022-06-21 RX ORDER — LIDOCAINE HYDROCHLORIDE 20 MG/ML
INJECTION, SOLUTION INFILTRATION; PERINEURAL PRN
Status: DISCONTINUED | OUTPATIENT
Start: 2022-06-21 | End: 2022-06-21

## 2022-06-21 RX ORDER — METRONIDAZOLE 500 MG/100ML
500 INJECTION, SOLUTION INTRAVENOUS
Status: COMPLETED | OUTPATIENT
Start: 2022-06-21 | End: 2022-06-21

## 2022-06-21 RX ORDER — LIDOCAINE 40 MG/G
CREAM TOPICAL
Status: DISCONTINUED | OUTPATIENT
Start: 2022-06-21 | End: 2022-06-21 | Stop reason: HOSPADM

## 2022-06-21 RX ORDER — BUPIVACAINE HYDROCHLORIDE 2.5 MG/ML
INJECTION, SOLUTION INFILTRATION; PERINEURAL PRN
Status: DISCONTINUED | OUTPATIENT
Start: 2022-06-21 | End: 2022-06-21 | Stop reason: HOSPADM

## 2022-06-21 RX ORDER — SODIUM CHLORIDE, SODIUM LACTATE, POTASSIUM CHLORIDE, CALCIUM CHLORIDE 600; 310; 30; 20 MG/100ML; MG/100ML; MG/100ML; MG/100ML
INJECTION, SOLUTION INTRAVENOUS CONTINUOUS
Status: DISCONTINUED | OUTPATIENT
Start: 2022-06-21 | End: 2022-06-21 | Stop reason: HOSPADM

## 2022-06-21 RX ORDER — ONDANSETRON 4 MG/1
4 TABLET, ORALLY DISINTEGRATING ORAL
Status: DISCONTINUED | OUTPATIENT
Start: 2022-06-21 | End: 2022-06-22 | Stop reason: HOSPADM

## 2022-06-21 RX ORDER — PROPOFOL 10 MG/ML
INJECTION, EMULSION INTRAVENOUS PRN
Status: DISCONTINUED | OUTPATIENT
Start: 2022-06-21 | End: 2022-06-21

## 2022-06-21 RX ORDER — PROPOFOL 10 MG/ML
INJECTION, EMULSION INTRAVENOUS CONTINUOUS PRN
Status: DISCONTINUED | OUTPATIENT
Start: 2022-06-21 | End: 2022-06-21

## 2022-06-21 RX ORDER — CEFAZOLIN SODIUM 2 G/50ML
2 SOLUTION INTRAVENOUS
Status: COMPLETED | OUTPATIENT
Start: 2022-06-21 | End: 2022-06-21

## 2022-06-21 RX ORDER — ONDANSETRON 2 MG/ML
4 INJECTION INTRAMUSCULAR; INTRAVENOUS EVERY 30 MIN PRN
Status: DISCONTINUED | OUTPATIENT
Start: 2022-06-21 | End: 2022-06-22 | Stop reason: HOSPADM

## 2022-06-21 RX ADMIN — LIDOCAINE HYDROCHLORIDE 60 MG: 20 INJECTION, SOLUTION INFILTRATION; PERINEURAL at 11:59

## 2022-06-21 RX ADMIN — ACETAMINOPHEN 975 MG: 325 TABLET ORAL at 10:44

## 2022-06-21 RX ADMIN — PROPOFOL 50 MG: 10 INJECTION, EMULSION INTRAVENOUS at 11:59

## 2022-06-21 RX ADMIN — PROPOFOL 150 MCG/KG/MIN: 10 INJECTION, EMULSION INTRAVENOUS at 12:00

## 2022-06-21 RX ADMIN — PROPOFOL 30 MG: 10 INJECTION, EMULSION INTRAVENOUS at 12:01

## 2022-06-21 RX ADMIN — METRONIDAZOLE 500 MG: 500 INJECTION, SOLUTION INTRAVENOUS at 10:43

## 2022-06-21 RX ADMIN — SODIUM CHLORIDE, SODIUM LACTATE, POTASSIUM CHLORIDE, CALCIUM CHLORIDE: 600; 310; 30; 20 INJECTION, SOLUTION INTRAVENOUS at 10:42

## 2022-06-21 RX ADMIN — CEFAZOLIN SODIUM 2 G: 2 SOLUTION INTRAVENOUS at 12:00

## 2022-06-21 NOTE — OR NURSING
At time of discharge, pt expressed concern about still being able to feel a superficial suture.  This RN was able to palpate a possible suture just above abdominal incision.  Dr. Hung paged to assess patient prior to discharge.

## 2022-06-21 NOTE — ANESTHESIA PREPROCEDURE EVALUATION
Anesthesia Pre-Procedure Evaluation    Patient: Radha Jameson   MRN: [id number] : 1960        Procedure : Procedure(s):  exam under anesthesia, suture removal, pouchoscopy          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     Ulcerative pancolitis with complication (H)      Umbilical hernia without obstruction and without gangrene       Past Surgical History:   Procedure Laterality Date     BILATERAL OOPHORECTOMY  2009     BREAST SURGERY      reconstruction      SECTION       COLECTOMY TOTAL       COLONOSCOPY       CV FEMORAL ANGIOGRAM       DAVINCI COLECTOMY N/A 6/10/2021    Procedure: ROBOT-ASSISTED  ileal pouch-anal anastomosis, diverting loop ileostomy;  Surgeon: Uriel Hung MD;  Location: [facility] OR     EGD      , 2017     EXAM UNDER ANESTHESIA REMOVE SUTURE / STAPLE N/A 2021    Procedure: EXAM UNDER ANESTHESIA, WITH SUTURE REMOVAL;  Surgeon: Yemi Del Rio MD;  Location: [facility] OR     GASTRIC FUNDOPLICATION  2017     HERNIORRHAPHY VENTRAL N/A 2021    Procedure: HERNIORRHAPHY, VENTRAL, OPEN;  Surgeon: Yemi Del Rio MD;  Location: [facility] OR     HIATAL HERNIA REPAIR  2017    LINX     INSERT STENT URETER N/A 6/10/2021    Procedure: INSERTION, STENT, URETER, PREOPERATIVE;  Surgeon: Joaquin Dixon MD;  Location: [facility] OR     OVARIAN CYST REMOVAL       PELVIC LAPAROSCOPY       Removal of LINX       RLE Venogram/thrombolysis Right 05/15/2020     SIGMOIDOSCOPY FLEXIBLE N/A 6/10/2021    Procedure: SIGMOIDOSCOPY, FLEXIBLE;   Surgeon: Uriel Hung MD;  Location: [facility] OR     TAKEDOWN ILEOSTOMY N/A 8/10/2021    Procedure: CLOSURE, ILEOSTOMY;  Surgeon: Uriel Hung MD;  Location: [facility] OR      Allergies   Allergen Reactions     Warfarin Other (See Comments)     Internal bleeding reaction.     Wasp Venom Protein Angioedema and Other (See Comments)     Morphine Itching     Itching in throat per pt     Amlodipine Other (See Comments)     Edema        Social History     Tobacco Use     Smoking status: Never Smoker     Smokeless tobacco: Never Used   Substance Use Topics     Alcohol use: Yes     Comment: couple coctails most days      Wt Readings from Last 1 Encounters:   06/21/22 70.3 kg (155 lb)        Anesthesia Evaluation            ROS/MED HX  ENT/Pulmonary:       Neurologic:       Cardiovascular:     (+) hypertension-----    METS/Exercise Tolerance:     Hematologic:       Musculoskeletal:       GI/Hepatic:     (+) GERD,     Renal/Genitourinary:     (+) renal disease, type: CRI,     Endo:     (+) thyroid problem, hypothyroidism,     Psychiatric/Substance Use:       Infectious Disease:       Malignancy:       Other:            Physical Exam    Airway        Mallampati: I   TM distance: < 3 FB   Neck ROM: full     Respiratory Devices and Support         Dental           Cardiovascular             Pulmonary                   OUTSIDE LABS:  CBC:   Lab Results   Component Value Date    WBC 7.2 08/18/2021    WBC 6.2 07/26/2021    HGB 12.1 11/08/2021    HGB 11.4 (L) 10/13/2021    HCT 32.8 (L) 08/18/2021    HCT 38.3 07/26/2021     08/18/2021     08/11/2021     BMP:   Lab Results   Component Value Date     11/08/2021     08/18/2021    POTASSIUM 5.0 11/08/2021    POTASSIUM 4.3 10/13/2021    CHLORIDE 109 11/08/2021    CHLORIDE 108 08/18/2021    CO2 25 11/08/2021    CO2 31 08/18/2021    BUN 49 (H) 11/08/2021    BUN 31 (H) 08/18/2021    CR 2.98 (H) 11/08/2021    CR 2.75 (H) 10/13/2021    GLC 89 11/08/2021     GLC 78 08/18/2021     COAGS:   Lab Results   Component Value Date    PTT 26 06/02/2021    INR 1.02 06/02/2021     POC:   Lab Results   Component Value Date    BGM 89 06/10/2021     HEPATIC:   Lab Results   Component Value Date    ALBUMIN 3.9 11/08/2021    PROTTOTAL 7.6 07/26/2021    ALT 20 07/26/2021    AST 16 07/26/2021    ALKPHOS 58 07/26/2021    BILITOTAL 0.5 07/26/2021     OTHER:   Lab Results   Component Value Date    STEPHANIE 9.1 11/08/2021    PHOS 4.2 11/08/2021    MAG 1.8 08/11/2021       Anesthesia Plan    ASA Status:  2      Anesthesia Type: MAC.     - Reason for MAC: immobility needed   Induction: Intravenous.   Maintenance: TIVA.        Consents    Anesthesia Plan(s) and associated risks, benefits, and realistic alternatives discussed. Questions answered and patient/representative(s) expressed understanding.     - Discussed: Risks, Benefits and Alternatives for BOTH SEDATION and the PROCEDURE were discussed     - Discussed with:  Patient      - Extended Intubation/Ventilatory Support Discussed: No.      - Patient is DNR/DNI Status: No    Use of blood products discussed: No .     Postoperative Care            Comments:                Rian Randolph MD, MD

## 2022-06-21 NOTE — OP NOTE
"Procedure Date: 06/21/2022.    PREOPERATIVE DIAGNOSES:    1.  History of ulcerative pancolitis (status post restorative proctocolectomy).  2.  Suture granuloma.    POSTOPERATIVE DIAGNOSES:    1.  History of ulcerative pancolitis (status post restorative proctocolectomy).  2.  Suture granuloma.     ANESTHESIA:  MAC plus local anesthetic.    PROCEDURES PERFORMED:    1.  Flexible pouchoscopy.  2.  Excision of abdominal wall Prolene suture.    SURGEON:  Uriel Hung MD    ASSISTANT:  Noe Fenton MD (General Surgery Resident)    DESCRIPTION OF OPERATION:  After obtaining informed consent, the patient was brought to the operating room and placed in the modified lithotomy position.  MAC anesthesia was gently induced without difficulty.  The patient received appropriate preoperative antibiotic prophylaxis as well as mechanical DVT prophylaxis.  Bilateral lower extremity pneumatic compression devices were applied, and all pressure points were cushioned.  A \"time out\" was performed.  A pediatric colonoscope was then passed transanally all the way up to the ileum.  The ileal J pouch showed no evidence of inflammation, ulceration or neoplasia.  The integrity of the staple lines was intact.  There was no clear evidence of stenosis at the ileoanal anastomosis.  The rectal cuff measured approximately 2-3 cm, and there was no evidence of cuffitis.  No biopsies were taken.  The colonoscope was then removed.  The abdomen was then prepped and draped in a standard sterile fashion.  A 2 cm horizontal incision was placed at the previous ileostomy takedown site.  Subcutaneous tissue was dissected down to the site where we found two Prolene sutures. There was no granuloma, but both Prolene sutures were easily felt under the skin.  Both sutures were removed completely.  Hemostasis was corroborated.  The dermis was reapproximated with inverted 3-0 Vicryl sutures.  The skin was reapproximated with a running 4-0 Monocryl subcuticular " suture and Exofin glue.  Instrument, sponge and needle counts were all correct as reported to me.  The patient tolerated the procedure well.    COMPLICATIONS:  None immediately.    ESTIMATED BLOOD LOSS:  3 mL    REPLACEMENT:  200 mL of crystalloid    DRAINS/TUBES:  None.    SPECIMENS:  None.    FINDINGS:  Normal pouchoscopy.  Two Prolene sutures removed from the old ileostomy site.    DISPOSITION:  PACU.    Uriel Hung MD        D: 2022   T: 2022   MT: [email or web address]    Name:     CSOTT MATUTE  MRN:      [id number]        Account:        [id number]   :      1960           Procedure Date: 2022     Document: [id number]    cc:  MD Blank Reza MD

## 2022-06-21 NOTE — DISCHARGE INSTRUCTIONS
"[facility]  Home Care Following Anesthesia  For 24 hours after surgery:  Get plenty of rest.  A responsible adult must stay with you for at least 24 hours after you leave the surgery center.  Do not drive or use heavy equipment.  If you have weakness or tingling, don't drive or use heavy equipment until this feeling goes away.   Do not drink alcohol.   Avoid strenuous or risky activities.  Ask for help when climbing stairs.  You may feel lightheaded.  IF so, sit for a few minutes before standing.  Have someone help you get up.   If you have nausea (feel sick to your stomach): Drink only clear liquids such as apple juice, ginger ale, broth or 7-Up.  Rest may also help.  Be sure to drink enough fluids.  Move to a regular diet as you feel able.   You may have a slight fever.  Call the doctor if your fever is over 100 F (37.7 C) (taken under the tongue) or lasts longer than 24 hours.  You may have a dry mouth, a sore throat, muscle aches or trouble sleeping. These should go away after 24 hours.  Do not make important or legal decisions.   It is recommended to avoid smoking.        Today you received a Marcaine or bupivacaine block to numb the nerves near your surgery site.  This is a block using local anesthetic or \"numbing\" medication injected around the nerves to anesthetize or \"numb\" the area supplied by those nerves.  This block is injected into the muscle layer near your surgical site.  The medication may numb the location where you had surgery for 6-18 hours, but may last up to 24 hours.  If your surgical site is an arm or leg you should be careful with your affected limb, since it is possible to injure your limb without being aware of it due to the numbing.  Until full feeling returns, you should guard against bumping or hitting your limb, and avoid extreme hot or cold temperatures on the skin.  As the block wears off, the feeling will return as a tingling or prickly " sensation near your surgical site.  You will experience more discomfort from your incision as the feeling returns.  You may want to take a pain pill (a narcotic or Tylenol if this was prescribed by your surgeon) when you start to experience mild pain before the pain beccomes more severe.  If your pain medications do not control your pain you should notifiy your surgeon.    Tips for taking pain medications  To get the best pain relief possible, remember these points:  Take pain medications as directed, before pain becomes severe.  Pain medication can upset your stomach: taking it with food may help.  Constipation is a common side effect of pain medication. Drink plenty of  fluids.  Eat foods high in fiber. Take a stool softener if recommended by your doctor or pharmacist.  Do not drink alcohol, drive or operate machinery while taking pain medications.  Ask about other ways to control pain, such as with heat, ice or relaxation.    Tylenol/Acetaminophen Consumption  To help encourage the safe use of acetaminophen, the makers of TYLENOL  have lowered the maximum daily dose for single-ingredient Extra Strength TYLENOL  (acetaminophen) products sold in the [place] from 8 pills per day (4,000 mg) to 6 pills per day (3,000 mg). The dosing interval has also changed from 2 pills every 4-6 hours to 2 pills every 6 hours.  If you feel your pain relief is insufficient, you may take Tylenol/Acetaminophen in addition to your narcotic pain medication.   Be careful not to exceed 3,000 mg of Tylenol/Acetaminophen in a 24 hour period from all sources.  If you are taking extra strength Tylenol/acetaminophen (500 mg), the maximum dose is 6 tablets in 24 hours.  If you are taking regular strength acetaminophen (325 mg), the maximum dose is 9 tablets in 24 hours.    Call a doctor for any of the following:  Signs of infection (fever, growing tenderness at the surgery site, a large amount of drainage or bleeding, severe pain, foul-smelling  drainage, redness, swelling).  It has been over 8 to 10 hours since surgery and you are still not able to urinate (pass water).  Headache for over 24 hours.  Numbness, tingling or weakness the day after surgery (if you had spinal anesthesia).  Signs of Covid-19 infection (temperature over 100 degrees, shortness of breath, cough, loss of taste/smell, generalized body aches, persistent headache, chills, sore throat, nausea/vomiting/diarrhea)  Your doctor is:  Dr. Uriel Hung, Colon Rectal: [phone]                  Or dial [phone] and ask for the resident on call for:  Colon Rectal  For emergency care, call the:  [facility]:  [phone] (TTY for hearing impaired: [phone])

## 2022-06-21 NOTE — ANESTHESIA CARE TRANSFER NOTE
Patient: Radha COSTA Stalcar    Procedure: Procedure(s):  exam under anesthesia, suture removal, pouchoscopy       Diagnosis: Suture granuloma, initial encounter [T81.89XA]  Ulcerative pancolitis with complication (H) [K51.019]  Diagnosis Additional Information: No value filed.    Anesthesia Type:   MAC     Note:      Level of Consciousness: awake  Oxygen Supplementation: room air    Independent Airway: airway patency satisfactory and stable  Dentition: dentition unchanged  Vital Signs Stable: post-procedure vital signs reviewed and stable  Report to RN Given: handoff report given  Patient transferred to: Phase II    Handoff Report: Identifed the Patient, Identified the Reponsible Provider, Reviewed the pertinent medical history, Discussed the surgical course, Reviewed Intra-OP anesthesia mangement and issues during anesthesia, Set expectations for post-procedure period and Allowed opportunity for questions and acknowledgement of understanding      Vitals:  Vitals Value Taken Time   BP     Temp 36.3  C (97.4  F) 06/21/22 1240   Pulse 64 06/21/22 1240   Resp 18 06/21/22 1240   SpO2 99 % 06/21/22 1240       Electronically Signed By: LEVI Corona CRNA  June 21, 2022  12:43 PM

## 2022-06-21 NOTE — ANESTHESIA POSTPROCEDURE EVALUATION
Patient: Radha COSTA Stalcar    Procedure: Procedure(s):  exam under anesthesia, suture removal, pouchoscopy       Anesthesia Type:  MAC    Note:  Disposition: Outpatient   Postop Pain Control: Uneventful            Sign Out: Well controlled pain   PONV: No   Neuro/Psych: Uneventful            Sign Out: Acceptable/Baseline neuro status   Airway/Respiratory:    CV/Hemodynamics: Uneventful            Sign Out: Acceptable CV status; No obvious hypovolemia; No obvious fluid overload   Other NRE: NONE   DID A NON-ROUTINE EVENT OCCUR? No           Last vitals:  Vitals Value Taken Time   BP     Temp 36.3  C (97.4  F) 06/21/22 1240   Pulse 64 06/21/22 1240   Resp 18 06/21/22 1240   SpO2 99 % 06/21/22 1240       Electronically Signed By: Rian Randolph MD, MD  June 21, 2022  12:43 PM

## 2022-06-21 NOTE — BRIEF OP NOTE
[facility]    Brief Operative Note    Pre-operative diagnosis: Suture granuloma, initial encounter [T81.89XA]  Ulcerative pancolitis with complication (H) [K51.019]  Post-operative diagnosis Same as pre-operative diagnosis    Procedure: Procedure(s):  exam under anesthesia, suture removal, pouchoscopy  Surgeon: Surgeon(s) and Role:     * Uriel Hung MD - Primary  Anesthesia: Monitor Anesthesia Care   Estimated Blood Loss: Minimal    Drains: None  Specimens: * No specimens in log *  Findings:   NL pouchoscopy. Single Prolene suture removed.  Complications: None.  Implants: * No implants in log *

## 2022-06-24 ENCOUNTER — OFFICE VISIT (OUTPATIENT)
Dept: SURGERY | Facility: CLINIC | Age: 62
End: 2022-06-24
Payer: COMMERCIAL

## 2022-06-24 ENCOUNTER — NURSE TRIAGE (OUTPATIENT)
Dept: CALL CENTER | Age: 62
End: 2022-06-24

## 2022-06-24 VITALS
SYSTOLIC BLOOD PRESSURE: 135 MMHG | TEMPERATURE: 97.4 F | HEIGHT: 69 IN | DIASTOLIC BLOOD PRESSURE: 92 MMHG | HEART RATE: 60 BPM | BODY MASS INDEX: 22.96 KG/M2 | WEIGHT: 155 LBS | OXYGEN SATURATION: 100 %

## 2022-06-24 DIAGNOSIS — Z09 FOLLOW-UP EXAMINATION AFTER COLORECTAL SURGERY: Primary | ICD-10-CM

## 2022-06-24 PROCEDURE — 99024 POSTOP FOLLOW-UP VISIT: CPT | Performed by: NURSE PRACTITIONER

## 2022-06-24 ASSESSMENT — PAIN SCALES - GENERAL: PAINLEVEL: MILD PAIN (2)

## 2022-06-24 NOTE — TELEPHONE ENCOUNTER
"Radha is status post removal of retained suture on 6/21.  She states the area almost is like when she had a hernia.  It has swollen up to the size of a golf ball.  It is progressively been getting larger.  It is not painful.  Denies drainage.  Denies extended redness.  Denies fevers.  She states that she \"can push it back in and it goes flat but then it will come right back.\"    Scheduled same day appt with Simi Chen NP as the area has been getting progressively larger with concern for a fluid collection around the incision.   "

## 2022-06-24 NOTE — NURSING NOTE
"Chief Complaint   Patient presents with     Post-op Visit     Post-op problem       Vitals:    06/24/22 1002   BP: (!) 135/92   BP Location: Left arm   Patient Position: Sitting   Cuff Size: Adult Regular   Pulse: 60   Temp: 97.4  F (36.3  C)   TempSrc: Oral   SpO2: 100%   Weight: 155 lb   Height: 5' 9\"       Body mass index is 22.89 kg/m .    Alondra Govea CMA    "

## 2022-06-24 NOTE — LETTER
"2022       RE: Radha Jameson  [address]  [address]  [place] [address]     Dear Colleague,    Thank you for referring your patient, Radha Jameson, to the [facility] COLON AND RECTAL SURGERY CLINIC [place] at [facility]. Please see a copy of my visit note below.    Colon and Rectal Surgery Postoperative Clinic Note    RE: Radha Jameson  : 1960  JU: 2022    Radha Jameson is a very pleasant 61 year old female with history of ulcerative pancolitis with J pouch and ileostomy takedown 8/10/21 who had some retained sutures at her stoma site that were removed in the OR 22 with Dr. Hung. She presents today for a golf ball sized lump at her surgical site.    Interval history: Radha has been doing well but she developed a golf ball size lump at her incision sites the day after surgery.  This is only tender if she pushes on it hard but otherwise no pain.  No fevers or chills.  No erythema.  No drainage.  She states that it does not feel like her hernia did in the past.    Physical Examination: Exam was chaperoned by Alondra Govea MA   BP (!) 135/92 (BP Location: Left arm, Patient Position: Sitting, Cuff Size: Adult Regular)   Pulse 60   Temp 97.4  F (36.3  C) (Oral)   Ht 5' 9\"   Wt 155 lb   LMP 2009   SpO2 100%   BMI 22.89 kg/m           General: alert, oriented, in no acute distress, sitting comfortably  HEENT: mucous membranes moist  Abdomen: Incision site in the right lower quadrant with Dermabond in place.  She does have a nontender area of the fluctuance to the right of the incision site.  This is not reducible. No erythema. I gently opened the corner of her incision up and was able to get old bloody drainage from the site.  The lump was immediately much smaller.  No purulent drainage.    Assessment/Plan:  61 year old female with history of ulcerative pancolitis with J pouch and ileostomy takedown " 8/10/21 who had some retained sutures at her stoma site that were removed in the OR 22 with Dr. Hung.  She had a small hematoma at her incision site.  I opened the corner of her incision to allow this to drain and only old blood resulted.  No purulent drainage.  We will have her apply an external gauze dressing as needed for any drainage.  She is scheduled in 2 weeks for follow-up with me.  If she develops any worsening symptoms in the meantime, I would like to see her back in clinic. Patient's questions were answered to her stated satisfaction and she is in agreement with this plan.    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     Ulcerative pancolitis with complication (H)      Umbilical hernia without obstruction and without gangrene        Surgical history:  Past Surgical History:   Procedure Laterality Date     BILATERAL OOPHORECTOMY  2009     BREAST SURGERY  2003    reconstruction      SECTION       COLECTOMY TOTAL  2019     COLONOSCOPY       COLONOSCOPY N/A 2022    Procedure: exam under anesthesia, suture removal, pouchoscopy;  Surgeon: Uriel Hung MD;  Location: [facility] OR     CV FEMORAL ANGIOGRAM       DAVINCI COLECTOMY N/A 6/10/2021    Procedure: ROBOT-ASSISTED  ileal pouch-anal anastomosis, diverting loop ileostomy;  Surgeon: Uriel Hung MD;  Location: [facility] OR     EGD      ,      EXAM UNDER ANESTHESIA REMOVE SUTURE / STAPLE N/A 2021    Procedure: EXAM UNDER ANESTHESIA, WITH SUTURE REMOVAL;  Surgeon:  Yemi Del Rio MD;  Location: [facility] OR     GASTRIC FUNDOPLICATION  2017     HERNIORRHAPHY VENTRAL N/A 11/11/2021    Procedure: HERNIORRHAPHY, VENTRAL, OPEN;  Surgeon: Yemi Del Rio MD;  Location: [facility] OR     HIATAL HERNIA REPAIR  2017    LINX     INSERT STENT URETER N/A 6/10/2021    Procedure: INSERTION, STENT, URETER, PREOPERATIVE;  Surgeon: Joaquin Dixon MD;  Location: [facility] OR     OVARIAN CYST REMOVAL  1988     PELVIC LAPAROSCOPY  1985     Removal of LINX       RLE Venogram/thrombolysis Right 05/15/2020     SIGMOIDOSCOPY FLEXIBLE N/A 6/10/2021    Procedure: SIGMOIDOSCOPY, FLEXIBLE;  Surgeon: Uriel Hung MD;  Location: [facility] OR     TAKEDOWN ILEOSTOMY N/A 8/10/2021    Procedure: CLOSURE, ILEOSTOMY;  Surgeon: Uriel Hung MD;  Location: [facility] OR       Problem list:  Patient Active Problem List    Diagnosis Date Noted     Suture granuloma, initial encounter 05/27/2022     Priority: Medium     Added automatically from request for surgery [id number]       Ventral hernia without obstruction or gangrene 10/06/2021     Priority: Medium     Added automatically from request for surgery [id number]       Chronic kidney disease, stage 3 (H) 08/18/2021     Priority: Medium     Follow-up examination after colorectal surgery 07/02/2021     Priority: Medium     Added automatically from request for surgery [id number]       Ulcerative pancolitis with complication (H) 04/28/2021     Priority: Medium     Added automatically from request for surgery [id number]         Medications:  Current Outpatient Medications   Medication Sig Dispense Refill     acetaminophen (TYLENOL) 325 MG tablet Take 3 tablets (975 mg) by mouth 4 times daily for 8 days Alternate with ibuprofen (ADVIL/MOTRIN), IF ordered. 100 tablet 0     aspirin (ASA) 325 MG EC tablet Take 325 mg by mouth every evening       augmented betamethasone dipropionate (DIPROLENE) 0.05 % external lotion Apply topically 2 times daily scalp       BIOTIN PO Take 1  tablet by mouth 2 times daily       busPIRone (BUSPAR) 5 MG tablet Take 10 mg by mouth 2 times daily       carvedilol (COREG) 25 MG tablet Take 12.5 mg by mouth 2 times daily (with meals)       dutasteride (AVODART) 0.5 MG capsule Take 0.5 mg by mouth 2 times daily        EPINEPHrine (ANY BX GENERIC EQUIV) 0.3 MG/0.3ML injection 2-pack once as needed for anaphylaxis        escitalopram (LEXAPRO) 20 MG tablet Take 20 mg by mouth every morning       ketoconazole (NIZORAL) 2 % external shampoo daily as needed        loperamide (IMODIUM A-D) 2 MG tablet Take 2 tablets (4 mg) by mouth 3 times daily as needed for diarrhea 60 tablet 5     loperamide (IMODIUM A-D) 2 MG tablet Take 1 tablet before bedtime. Can increase to 2 tablets before bedtime (Patient taking differently: 2 times daily as needed Take 1 tablet before bedtime. Can increase to 2 tablets before bedtime) 60 tablet 3     NONFORMULARY Viviscal - for hair - supplement - 1 tablet twice daily       nortriptyline (PAMELOR) 25 MG capsule Take 25 mg by mouth At Bedtime        polyethylene glycol (GOLYTELY) 236 g suspension At 7:30 AM on the day of the exam:  Start drinking the Golytely jug. Drink one 8-ounce glass every 15 minutes until the jug is half empty.  Discard remainder of container. (Patient not taking: No sig reported) 4000 mL 0       Allergies:  Allergies   Allergen Reactions     Warfarin Other (See Comments)     Internal bleeding reaction.     Wasp Venom Protein Angioedema and Other (See Comments)     Morphine Itching     Itching in throat per pt     Amlodipine Other (See Comments)     Edema         Family history:  Family History   Problem Relation Age of Onset     Matson's esophagus Father      Diabetes Mother      Hypertension Mother      Multiple myeloma Mother      Ulcerative Colitis Mother      Anesthesia Reaction Mother         post op delirium     Cervical Cancer Sister      Diabetes Type 2  Brother      Asthma Maternal Grandmother      Breast  "Cancer Maternal Grandmother      Breast Cancer Paternal Grandmother      Clotting Disorder No family hx of      Bleeding Disorder No family hx of        Social history:  Social History     Tobacco Use     Smoking status: Never Smoker     Smokeless tobacco: Never Used   Substance Use Topics     Alcohol use: Yes     Comment: couple coctails most days     Marital status: .    Nursing Notes:   Alondra Govea  6/24/2022 10:08 AM  Signed  Chief Complaint   Patient presents with     Post-op Visit     Post-op problem       Vitals:    06/24/22 1002   BP: (!) 135/92   BP Location: Left arm   Patient Position: Sitting   Cuff Size: Adult Regular   Pulse: 60   Temp: 97.4  F (36.3  C)   TempSrc: Oral   SpO2: 100%   Weight: 155 lb   Height: 5' 9\"       Body mass index is 22.89 kg/m .    Alondra Govea CMA    15 minutes spent on the date of the encounter doing chart review, history and exam, documentation and further activities as noted above.   This is a postop visit.    LEVI Rowe, NP-C  Colon and Rectal Surgery  [facility]    This note was created using speech recognition software and may contain unintended word substitutions.  "

## 2022-06-24 NOTE — PROGRESS NOTES
"Colon and Rectal Surgery Postoperative Clinic Note    RE: Radha Jameson  : 1960  JU: 2022    Radha Jameson is a very pleasant 61 year old female with history of ulcerative pancolitis with J pouch and ileostomy takedown 8/10/21 who had some retained sutures at her stoma site that were removed in the OR 22 with Dr. Hung. She presents today for a golf ball sized lump at her surgical site.    Interval history: Radha has been doing well but she developed a golf ball size lump at her incision sites the day after surgery.  This is only tender if she pushes on it hard but otherwise no pain.  No fevers or chills.  No erythema.  No drainage.  She states that it does not feel like her hernia did in the past.    Physical Examination: Exam was chaperoned by Alondra Govea MA   BP (!) 135/92 (BP Location: Left arm, Patient Position: Sitting, Cuff Size: Adult Regular)   Pulse 60   Temp 97.4  F (36.3  C) (Oral)   Ht 5' 9\"   Wt 155 lb   LMP 2009   SpO2 100%   BMI 22.89 kg/m           General: alert, oriented, in no acute distress, sitting comfortably  HEENT: mucous membranes moist  Abdomen: Incision site in the right lower quadrant with Dermabond in place.  She does have a nontender area of the fluctuance to the right of the incision site.  This is not reducible. No erythema. I gently opened the corner of her incision up and was able to get old bloody drainage from the site.  The lump was immediately much smaller.  No purulent drainage.    Assessment/Plan:  61 year old female with history of ulcerative pancolitis with J pouch and ileostomy takedown 8/10/21 who had some retained sutures at her stoma site that were removed in the OR 22 with Dr. Hung.  She had a small hematoma at her incision site.  I opened the corner of her incision to allow this to drain and only old blood resulted.  No purulent drainage.  We will have her apply an external gauze dressing as needed for any drainage.  " She is scheduled in 2 weeks for follow-up with me.  If she develops any worsening symptoms in the meantime, I would like to see her back in clinic. Patient's questions were answered to her stated satisfaction and she is in agreement with this plan.    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     Ulcerative pancolitis with complication (H)      Umbilical hernia without obstruction and without gangrene        Surgical history:  Past Surgical History:   Procedure Laterality Date     BILATERAL OOPHORECTOMY  2009     BREAST SURGERY      reconstruction      SECTION       COLECTOMY TOTAL       COLONOSCOPY       COLONOSCOPY N/A 2022    Procedure: exam under anesthesia, suture removal, pouchoscopy;  Surgeon: Uriel Hung MD;  Location: [facility] OR     CV FEMORAL ANGIOGRAM       DAVINCI COLECTOMY N/A 6/10/2021    Procedure: ROBOT-ASSISTED  ileal pouch-anal anastomosis, diverting loop ileostomy;  Surgeon: Uriel Hung MD;  Location: [facility] OR     EGD      , 2017     EXAM UNDER ANESTHESIA REMOVE SUTURE / STAPLE N/A 2021    Procedure: EXAM UNDER ANESTHESIA, WITH SUTURE REMOVAL;  Surgeon: Yemi Del Rio MD;  Location: [facility] OR     GASTRIC FUNDOPLICATION  2017     HERNIORRHAPHY VENTRAL N/A 2021    Procedure: HERNIORRHAPHY, VENTRAL, OPEN;  Surgeon: Yemi Del Rio MD;  Location: [facility] OR     HIATAL HERNIA REPAIR  2017    LINX     INSERT STENT URETER N/A 6/10/2021    Procedure: INSERTION, STENT, URETER, PREOPERATIVE;   Surgeon: Joaquin Dixon MD;  Location: [facility] OR     OVARIAN CYST REMOVAL  1988     PELVIC LAPAROSCOPY  1985     Removal of LINX       RLE Venogram/thrombolysis Right 05/15/2020     SIGMOIDOSCOPY FLEXIBLE N/A 6/10/2021    Procedure: SIGMOIDOSCOPY, FLEXIBLE;  Surgeon: Uriel Hung MD;  Location: [facility] OR     TAKEDOWN ILEOSTOMY N/A 8/10/2021    Procedure: CLOSURE, ILEOSTOMY;  Surgeon: Uriel Hung MD;  Location: [facility] OR       Problem list:  Patient Active Problem List    Diagnosis Date Noted     Suture granuloma, initial encounter 05/27/2022     Priority: Medium     Added automatically from request for surgery [id number]       Ventral hernia without obstruction or gangrene 10/06/2021     Priority: Medium     Added automatically from request for surgery [id number]       Chronic kidney disease, stage 3 (H) 08/18/2021     Priority: Medium     Follow-up examination after colorectal surgery 07/02/2021     Priority: Medium     Added automatically from request for surgery [id number]       Ulcerative pancolitis with complication (H) 04/28/2021     Priority: Medium     Added automatically from request for surgery [id number]         Medications:  Current Outpatient Medications   Medication Sig Dispense Refill     acetaminophen (TYLENOL) 325 MG tablet Take 3 tablets (975 mg) by mouth 4 times daily for 8 days Alternate with ibuprofen (ADVIL/MOTRIN), IF ordered. 100 tablet 0     aspirin (ASA) 325 MG EC tablet Take 325 mg by mouth every evening       augmented betamethasone dipropionate (DIPROLENE) 0.05 % external lotion Apply topically 2 times daily scalp       BIOTIN PO Take 1 tablet by mouth 2 times daily       busPIRone (BUSPAR) 5 MG tablet Take 10 mg by mouth 2 times daily       carvedilol (COREG) 25 MG tablet Take 12.5 mg by mouth 2 times daily (with meals)       dutasteride (AVODART) 0.5 MG capsule Take 0.5 mg by mouth 2 times daily        EPINEPHrine (ANY BX GENERIC EQUIV) 0.3 MG/0.3ML injection 2-pack once as needed  for anaphylaxis        escitalopram (LEXAPRO) 20 MG tablet Take 20 mg by mouth every morning       ketoconazole (NIZORAL) 2 % external shampoo daily as needed        loperamide (IMODIUM A-D) 2 MG tablet Take 2 tablets (4 mg) by mouth 3 times daily as needed for diarrhea 60 tablet 5     loperamide (IMODIUM A-D) 2 MG tablet Take 1 tablet before bedtime. Can increase to 2 tablets before bedtime (Patient taking differently: 2 times daily as needed Take 1 tablet before bedtime. Can increase to 2 tablets before bedtime) 60 tablet 3     NONFORMULARY Viviscal - for hair - supplement - 1 tablet twice daily       nortriptyline (PAMELOR) 25 MG capsule Take 25 mg by mouth At Bedtime        polyethylene glycol (GOLYTELY) 236 g suspension At 7:30 AM on the day of the exam:  Start drinking the Golytely jug. Drink one 8-ounce glass every 15 minutes until the jug is half empty.  Discard remainder of container. (Patient not taking: No sig reported) 4000 mL 0       Allergies:  Allergies   Allergen Reactions     Warfarin Other (See Comments)     Internal bleeding reaction.     Wasp Venom Protein Angioedema and Other (See Comments)     Morphine Itching     Itching in throat per pt     Amlodipine Other (See Comments)     Edema         Family history:  Family History   Problem Relation Age of Onset     Matson's esophagus Father      Diabetes Mother      Hypertension Mother      Multiple myeloma Mother      Ulcerative Colitis Mother      Anesthesia Reaction Mother         post op delirium     Cervical Cancer Sister      Diabetes Type 2  Brother      Asthma Maternal Grandmother      Breast Cancer Maternal Grandmother      Breast Cancer Paternal Grandmother      Clotting Disorder No family hx of      Bleeding Disorder No family hx of        Social history:  Social History     Tobacco Use     Smoking status: Never Smoker     Smokeless tobacco: Never Used   Substance Use Topics     Alcohol use: Yes     Comment: couple coctails most days  "    Marital status: .    Nursing Notes:   Alondra Govea  6/24/2022 10:08 AM  Signed  Chief Complaint   Patient presents with     Post-op Visit     Post-op problem       Vitals:    06/24/22 1002   BP: (!) 135/92   BP Location: Left arm   Patient Position: Sitting   Cuff Size: Adult Regular   Pulse: 60   Temp: 97.4  F (36.3  C)   TempSrc: Oral   SpO2: 100%   Weight: 155 lb   Height: 5' 9\"       Body mass index is 22.89 kg/m .    Alondra Govea CMA         15 minutes spent on the date of the encounter doing chart review, history and exam, documentation and further activities as noted above.   This is a postop visit.    LEVI Rowe, NP-C  Colon and Rectal Surgery  [facility]    This note was created using speech recognition software and may contain unintended word substitutions.    "

## 2022-06-24 NOTE — TELEPHONE ENCOUNTER
Nurse Triage SBAR    Is this a 2nd Level Triage? YES, LICENSED PRACTITIONER REVIEW IS REQUIRED    Situation: Post op - golf ball size tissue at area where suture removed- right lower abd.  See note for details    Background: 6-21-22  PROCEDURES PERFORMED:    1.  Flexible pouchoscopy.  2.  Excision of abdominal wall Prolene suture.     SURGEON:  Uriel Hung MD    Assessment: post op swelling-suture site    Protocol Recommended Disposition:   No disposition on file.    Recommendation:   High priority note to Dr. Hung clinic for pt call backj     Pt # [phone]    Reason for Disposition    Caller has NON-URGENT question and triager unable to answer question    Additional Information    Negative: Sounds like a life-threatening emergency to the triager    Negative: Chest pain    Negative: Difficulty breathing    Negative: Acting confused (e.g., disoriented, slurred speech) or excessively sleepy    Negative: Surgical incision symptoms and questions    Negative: Discomfort (pain, burning or stinging) when passing urine and male    Negative: Discomfort (pain, burning or stinging) when passing urine and female    Negative: Constipation    Negative: New or worsening leg (calf, thigh) pain    Negative: New or worsening leg swelling    Negative: Dizziness is severe, or persists > 24 hours after surgery    Negative: Symptoms arising from use of a urinary catheter (Valerio or Coude)    Negative: Cast problems or questions    Negative: Medication question    Negative: Bright red, wide-spread, sunburn-like rash    Negative: SEVERE headache and after spinal (epidural) anesthesia    Negative: Vomiting and persists > 4 hours    Negative: Vomiting and abdomen looks much more swollen than usual    Negative: Drinking very little and dehydration suspected (e.g., no urine > 12 hours, very dry mouth, very lightheaded)    Negative: Patient sounds very sick or weak to the triager    Negative: Sounds like a serious complication  "to the triager    Negative: Fever > 100.4 F (38.0 C)    Negative: Caller has URGENT question and triager unable to answer question    Negative: SEVERE post-op pain (e.g., excruciating, pain scale 8-10) that is not controlled with pain medications    Negative: Headache and after spinal (epidural) anesthesia and not severe    Negative: Fever present > 3 days (72 hours)    Negative: Patient wants to be seen    Negative: MILD TO MODERATE post-op pain (e.g., pain scale 1-7) that is not controlled with pain medications    Answer Assessment - Initial Assessment Questions  1. SYMPTOM: \"What's the main symptom you're concerned about?\" (e.g., pain, fever, vomiting)      Post op 6-21-22  Suture removed-  Another surgery out in recovery room-on abd, right lower.  Now this area about size of golf, no open area, glue still out, no redness.  Soft, able to push down flat- does not hurt.  ?  Is this normal or swelling  2. ONSET: \"When did   start?\"    Noticed increased  On 6-22-22    3. SURGERY: \"What surgery was performed?\"   PROCEDURES PERFORMED:    1.  Flexible pouchoscopy.  2.  Excision of abdominal wall Prolene suture.     SURGEON:  Uriel Hung MD  6-21-22  4. DATE of SURGERY: \"When was surgery performed?\"    6-21-22  5. ANESTHESIA: \" What type of anesthesia did you have?\" (e.g., general, spinal, epidural, local)    6. PAIN: \"Is there any pain?\" If so, ask: \"How bad is it?\"  (Scale 1-10; or mild, moderate, severe)    none  7. FEVER: \"Do you have a fever?\" If so, ask: \"What is your temperature, how was it measured, and when did it start?\"     No fever symptoms  8. VOMITING: \"Is there any vomiting?\" If yes, ask: \"How many times?\"      none  9. BLEEDING: \"Is there any bleeding?\" If so, ask: \"How much?\" and \"Where?\"  none  10. OTHER SYMPTOMS: \"Do you have any other symptoms?\" (e.g., drainage from wound, painful urination, constipation)        Voiding / BM without difficulty    Pt does not have my chart- unable to send " pictures.    Protocols used: POST-OP SYMPTOMS AND [id number]

## 2022-06-27 ENCOUNTER — LAB (OUTPATIENT)
Dept: LAB | Facility: CLINIC | Age: 62
End: 2022-06-27
Payer: COMMERCIAL

## 2022-06-27 ENCOUNTER — OFFICE VISIT (OUTPATIENT)
Dept: SURGERY | Facility: CLINIC | Age: 62
End: 2022-06-27
Payer: COMMERCIAL

## 2022-06-27 ENCOUNTER — NURSE TRIAGE (OUTPATIENT)
Dept: NURSING | Facility: CLINIC | Age: 62
End: 2022-06-27

## 2022-06-27 VITALS
BODY MASS INDEX: 22.89 KG/M2 | SYSTOLIC BLOOD PRESSURE: 152 MMHG | HEIGHT: 69 IN | OXYGEN SATURATION: 99 % | HEART RATE: 61 BPM | DIASTOLIC BLOOD PRESSURE: 100 MMHG

## 2022-06-27 DIAGNOSIS — T14.8XXA HEMATOMA OF SKIN: ICD-10-CM

## 2022-06-27 DIAGNOSIS — Z09 FOLLOW-UP EXAMINATION AFTER COLORECTAL SURGERY: ICD-10-CM

## 2022-06-27 DIAGNOSIS — Z20.822 SUSPECTED 2019 NOVEL CORONAVIRUS INFECTION: ICD-10-CM

## 2022-06-27 DIAGNOSIS — S30.1XXD ABDOMINAL WALL HEMATOMA, SUBSEQUENT ENCOUNTER: Primary | ICD-10-CM

## 2022-06-27 DIAGNOSIS — Z09 FOLLOW-UP EXAMINATION AFTER COLORECTAL SURGERY: Primary | ICD-10-CM

## 2022-06-27 LAB
ALBUMIN SERPL-MCNC: 3.6 G/DL (ref 3.4–5)
ALP SERPL-CCNC: 66 U/L (ref 40–150)
ALT SERPL W P-5'-P-CCNC: 37 U/L (ref 0–50)
ANION GAP SERPL CALCULATED.3IONS-SCNC: 6 MMOL/L (ref 3–14)
AST SERPL W P-5'-P-CCNC: 30 U/L (ref 0–45)
BASOPHILS # BLD AUTO: 0.1 10E3/UL (ref 0–0.2)
BASOPHILS NFR BLD AUTO: 1 %
BILIRUB SERPL-MCNC: 0.7 MG/DL (ref 0.2–1.3)
BUN SERPL-MCNC: 31 MG/DL (ref 7–30)
CALCIUM SERPL-MCNC: 9.5 MG/DL (ref 8.5–10.1)
CHLORIDE BLD-SCNC: 106 MMOL/L (ref 94–109)
CO2 SERPL-SCNC: 28 MMOL/L (ref 20–32)
CREAT SERPL-MCNC: 2.24 MG/DL (ref 0.52–1.04)
EOSINOPHIL # BLD AUTO: 0.4 10E3/UL (ref 0–0.7)
EOSINOPHIL NFR BLD AUTO: 6 %
ERYTHROCYTE [DISTWIDTH] IN BLOOD BY AUTOMATED COUNT: 14.6 % (ref 10–15)
GFR SERPL CREATININE-BSD FRML MDRD: 24 ML/MIN/1.73M2
GLUCOSE BLD-MCNC: 97 MG/DL (ref 70–99)
HCT VFR BLD AUTO: 38.4 % (ref 35–47)
HGB BLD-MCNC: 12.2 G/DL (ref 11.7–15.7)
IMM GRANULOCYTES # BLD: 0 10E3/UL
IMM GRANULOCYTES NFR BLD: 1 %
LYMPHOCYTES # BLD AUTO: 1.2 10E3/UL (ref 0.8–5.3)
LYMPHOCYTES NFR BLD AUTO: 19 %
MCH RBC QN AUTO: 30.3 PG (ref 26.5–33)
MCHC RBC AUTO-ENTMCNC: 31.8 G/DL (ref 31.5–36.5)
MCV RBC AUTO: 96 FL (ref 78–100)
MONOCYTES # BLD AUTO: 0.5 10E3/UL (ref 0–1.3)
MONOCYTES NFR BLD AUTO: 8 %
NEUTROPHILS # BLD AUTO: 4.1 10E3/UL (ref 1.6–8.3)
NEUTROPHILS NFR BLD AUTO: 65 %
NRBC # BLD AUTO: 0 10E3/UL
NRBC BLD AUTO-RTO: 0 /100
PLATELET # BLD AUTO: 236 10E3/UL (ref 150–450)
POTASSIUM BLD-SCNC: 5.1 MMOL/L (ref 3.4–5.3)
PROT SERPL-MCNC: 7.1 G/DL (ref 6.8–8.8)
RBC # BLD AUTO: 4.02 10E6/UL (ref 3.8–5.2)
SARS-COV-2 RNA RESP QL NAA+PROBE: NEGATIVE
SODIUM SERPL-SCNC: 140 MMOL/L (ref 133–144)
WBC # BLD AUTO: 6.2 10E3/UL (ref 4–11)

## 2022-06-27 PROCEDURE — 36415 COLL VENOUS BLD VENIPUNCTURE: CPT | Performed by: PATHOLOGY

## 2022-06-27 PROCEDURE — 80053 COMPREHEN METABOLIC PANEL: CPT | Performed by: PATHOLOGY

## 2022-06-27 PROCEDURE — U0005 INFEC AGEN DETEC AMPLI PROBE: HCPCS | Performed by: NURSE PRACTITIONER

## 2022-06-27 PROCEDURE — 85025 COMPLETE CBC W/AUTO DIFF WBC: CPT | Performed by: PATHOLOGY

## 2022-06-27 PROCEDURE — 99024 POSTOP FOLLOW-UP VISIT: CPT | Performed by: NURSE PRACTITIONER

## 2022-06-27 ASSESSMENT — PAIN SCALES - GENERAL: PAINLEVEL: MILD PAIN (3)

## 2022-06-27 NOTE — TELEPHONE ENCOUNTER
"Nurse Triage SBAR    Is this a 2nd Level Triage? YES, LICENSED PRACTITIONER REVIEW IS REQUIRED    Situation:   states site drained 6/24 in clinic.  Now hard gold ball size protrusion.    Background:   procedure 6/21. Pooling of blood. concerned may have dvt from this. Asks why when drained  Friday, now returned.    Assessment:   excessive sleepiness.blood pooling continues.    Protocol Recommended Disposition:   See More Appropriate Protocol    Recommendation:   call pt for f/u. expecting call back.  thanks.  Routed to provider    Does the patient meet one of the following criteria for ADS visit consideration? No      Reason for Disposition    Acting confused (e.g., disoriented, slurred speech) or excessively sleepy    Additional Information    Negative: Sounds like a life-threatening emergency to the triager    Negative: Chest pain    Negative: Difficulty breathing    Answer Assessment - Initial Assessment Questions  1. SYMPTOM: \"What's the main symptom you're concerned about?\" (e.g., pain, fever, vomiting)    Hard area, something protruding, golf ball size  2. ONSET: \"When did sx  start?\"     4-5 day ago  3. SURGERY: \"What surgery was performed?\"  punchoscopy adn suture removal  4. DATE of SURGERY: \"When was surgery performed?\"     6/21/22  5. ANESTHESIA: \" What type of anesthesia did you have?\" (e.g., general, spinal, epidural, local)    unknown  6. PAIN: \"Is there any pain?\" If so, ask: \"How bad is it?\"  (Scale 1-10; or mild, moderate, severe)    2/10 mild  7. FEVER: \"Do you have a fever?\" If so, ask: \"What is your temperature, how was it measured, and when did it start?\"  No not feverish  8. VOMITING: \"Is there any vomiting?\" If yes, ask: \"How many times?\"  no  9. BLEEDING: \"Is there any bleeding?\" If so, ask: \"How much?\" and \"Where?\"  Slight. Along inicision line, darker color, 1/2 teaspoon  10. OTHER SYMPTOMS: \"Do you have any other symptoms?\" (e.g., drainage from wound, painful urination, constipation)    " Slight blood. Extremely tired.    Protocols used: POST-OP SYMPTOMS AND [id number]

## 2022-06-27 NOTE — LETTER
"2022       RE: Radha Jameson  [address]  [address]  [place] [address]     Dear Colleague,    Thank you for referring your patient, Radha Jameson, to the [facility] COLON AND RECTAL SURGERY CLINIC [place] at [facility]. Please see a copy of my visit note below.    Colon and Rectal Surgery Postoperative Clinic Note    RE: Radha Jameson  : 1960  JU: 2022    Radha Jameson is a very pleasant 61 year old female with history of ulcerative pancolitis with J pouch and ileostomy takedown 8/10/21 who had some retained sutures at her stoma site that were removed in the OR 22 with Dr. Hung. I saw her last week with a hematoma at her surgical site. She presents today for bruising and worsening pain.    Interval history: Radha has had worsening bruising and a more firm area that she is concerned about. No fevers or chills but she again feels more fatigued and like her ears are ringing. Last time she felt like that her creatinine was elevated.    Physical Examination: Exam was chaperoned by JAVI Hampton   BP (!) 152/100 (BP Location: Left arm, Patient Position: Sitting, Cuff Size: Adult Large)   Pulse 61   Ht 5' 9\"   LMP 2009   SpO2 99%   BMI 22.89 kg/m     General: alert, oriented, in no acute distress, sitting comfortably  HEENT: mucous membranes moist  Abdomen: Incision site in the right lower quadrant with Dermabond in place.  Significant ecchymosis and some induration. The incision site was probed with a Qtip with only old bloody drainage present. Gently wicked with 1/4 inch NuGauze.    Assessment/Plan:  61 year old female with history of ulcerative pancolitis with J pouch and ileostomy takedown 8/10/21 who had some retained sutures at her stoma site that were removed in the OR 22 with Dr. Hung.  Large hematoma at her surgical site. Only old blood present. Discussed with Dr. Hung. " Recommended warm/cold compresses and holding ASA for 10 days. Will have her keep the incision opening by wicking with 1/4 inch NuGauze. Will check for COVID today given her fatigue and ears ringing and will see what her Cr is also. I would like to see her for a recheck next week. Patient's questions were answered to her stated satisfaction and she is in agreement with this plan.     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     Ulcerative pancolitis with complication (H)      Umbilical hernia without obstruction and without gangrene        Surgical history:  Past Surgical History:   Procedure Laterality Date     BILATERAL OOPHORECTOMY  2009     BREAST SURGERY      reconstruction      SECTION       COLECTOMY TOTAL       COLONOSCOPY       COLONOSCOPY N/A 2022    Procedure: exam under anesthesia, suture removal, pouchoscopy;  Surgeon: Uriel Hung MD;  [place] [facility] [place]     CV FEMORAL ANGIOGRAM       DAVINCI COLECTOMY N/A 6/10/2021    Procedure: ROBOT-ASSISTED  ileal pouch-anal anastomosis, diverting loop ileostomy;  Surgeon: Uriel Hung MD;  Location: [facility] OR     EGD      , 2017     EXAM UNDER ANESTHESIA REMOVE SUTURE / STAPLE N/A 2021    Procedure: EXAM UNDER ANESTHESIA, WITH SUTURE REMOVAL;  Surgeon: Yemi Del Rio MD;  [place] [facility] [place]     GASTRIC FUNDOPLICATION  2017     HERNIORRHAPHY VENTRAL N/A 2021    Procedure: HERNIORRHAPHY, VENTRAL, OPEN;  Surgeon: Yemi Del Rio MD;  [place]: [facility] [place]      HIATAL HERNIA REPAIR  2017    LINX     INSERT STENT URETER N/A 6/10/2021    Procedure: INSERTION, STENT, URETER, PREOPERATIVE;  Surgeon: Joaquin Dixon MD;  Location: [facility] OR     OVARIAN CYST REMOVAL  1988     PELVIC LAPAROSCOPY  1985     Removal of LINX       RLE Venogram/thrombolysis Right 05/15/2020     SIGMOIDOSCOPY FLEXIBLE N/A 6/10/2021    Procedure: SIGMOIDOSCOPY, FLEXIBLE;  Surgeon: Uriel Hung MD;  Location: [facility] OR     TAKEDOWN ILEOSTOMY N/A 8/10/2021    Procedure: CLOSURE, ILEOSTOMY;  Surgeon: Uriel Hung MD;  Location: [facility] OR       Problem list:    Patient Active Problem List    Diagnosis Date Noted     Suture granuloma, initial encounter 05/27/2022     Priority: Medium     Added automatically from request for surgery [id number]       Ventral hernia without obstruction or gangrene 10/06/2021     Priority: Medium     Added automatically from request for surgery [id number]       Chronic kidney disease, stage 3 (H) 08/18/2021     Priority: Medium     Follow-up examination after colorectal surgery 07/02/2021     Priority: Medium     Added automatically from request for surgery [id number]       Ulcerative pancolitis with complication (H) 04/28/2021     Priority: Medium     Added automatically from request for surgery [id number]         Medications:  Current Outpatient Medications   Medication Sig Dispense Refill     acetaminophen (TYLENOL) 325 MG tablet Take 3 tablets (975 mg) by mouth 4 times daily for 8 days Alternate with ibuprofen (ADVIL/MOTRIN), IF ordered. 100 tablet 0     aspirin (ASA) 325 MG EC tablet Take 325 mg by mouth every evening       augmented betamethasone dipropionate (DIPROLENE) 0.05 % external lotion Apply topically 2 times daily scalp       BIOTIN PO Take 1 tablet by mouth 2 times daily       busPIRone (BUSPAR) 5 MG tablet Take 10 mg by mouth 2 times daily       carvedilol (COREG) 25 MG tablet Take 12.5 mg by mouth 2 times daily (with meals)       dutasteride (AVODART)  0.5 MG capsule Take 0.5 mg by mouth 2 times daily        EPINEPHrine (ANY BX GENERIC EQUIV) 0.3 MG/0.3ML injection 2-pack once as needed for anaphylaxis        escitalopram (LEXAPRO) 20 MG tablet Take 20 mg by mouth every morning       ketoconazole (NIZORAL) 2 % external shampoo daily as needed        loperamide (IMODIUM A-D) 2 MG tablet Take 2 tablets (4 mg) by mouth 3 times daily as needed for diarrhea 60 tablet 5     loperamide (IMODIUM A-D) 2 MG tablet Take 1 tablet before bedtime. Can increase to 2 tablets before bedtime (Patient taking differently: 2 times daily as needed Take 1 tablet before bedtime. Can increase to 2 tablets before bedtime) 60 tablet 3     NONFORMULARY Viviscal - for hair - supplement - 1 tablet twice daily       nortriptyline (PAMELOR) 25 MG capsule Take 25 mg by mouth At Bedtime        polyethylene glycol (GOLYTELY) 236 g suspension At 7:30 AM on the day of the exam:  Start drinking the Golytely jug. Drink one 8-ounce glass every 15 minutes until the jug is half empty.  Discard remainder of container. (Patient not taking: No sig reported) 4000 mL 0       Allergies:  Allergies   Allergen Reactions     Warfarin Other (See Comments)     Internal bleeding reaction.     Wasp Venom Protein Angioedema and Other (See Comments)     Morphine Itching     Itching in throat per pt     Amlodipine Other (See Comments)     Edema         Family history:  Family History   Problem Relation Age of Onset     Matson's esophagus Father      Diabetes Mother      Hypertension Mother      Multiple myeloma Mother      Ulcerative Colitis Mother      Anesthesia Reaction Mother         post op delirium     Cervical Cancer Sister      Diabetes Type 2  Brother      Asthma Maternal Grandmother      Breast Cancer Maternal Grandmother      Breast Cancer Paternal Grandmother      Clotting Disorder No family hx of      Bleeding Disorder No family hx of        Social history:  Social History     Tobacco Use     Smoking  "status: Never Smoker     Smokeless tobacco: Never Used   Substance Use Topics     Alcohol use: Yes     Comment: couple coctails most days     Marital status: .    Nursing Notes:   Zamzam Lizarraga, EMT  6/27/2022 11:40 AM  Signed  Chief Complaint   Patient presents with     RECHECK     Painful hematoma       Vitals:    06/27/22 1138   BP: (!) 152/100   BP Location: Left arm   Patient Position: Sitting   Cuff Size: Adult Large   Pulse: 61   SpO2: 99%   Height: 5' 9\"       Body mass index is 22.89 kg/m .      Zamzam Lizarraga, TODD      15 minutes spent on the date of the encounter doing chart review, history and exam, documentation and further activities as noted above.   This is a postop visit.      LEVI Rowe, NP-C  Colon and Rectal Surgery  [facility]    This note was created using speech recognition software and may contain unintended word substitutions.  "

## 2022-06-27 NOTE — NURSING NOTE
"Chief Complaint   Patient presents with     RECHECK     Painful hematoma       Vitals:    06/27/22 1138   BP: (!) 152/100   BP Location: Left arm   Patient Position: Sitting   Cuff Size: Adult Large   Pulse: 61   SpO2: 99%   Height: 5' 9\"       Body mass index is 22.89 kg/m .                          Zamzam Lizarraga, EMT    "

## 2022-06-27 NOTE — TELEPHONE ENCOUNTER
Radha had a hematoma drained on 6/24/2022 from her original surgery on 6/21/2022. She states the area is now painful and hard. It's only draining a small amount at this point. Her skin is also purple near the incision and across her abd. On another note she does state that she is excessively sleepy. I do remember during Radha's last takedown surgery she explained this feeling as well. At the time it was excessively worked up but nothing came of these tests. She symptoms eventually subsided. Scheduled appt with Simi Chen NP today.

## 2022-06-27 NOTE — PROGRESS NOTES
"Colon and Rectal Surgery Postoperative Clinic Note    RE: Radha Jameson  : 1960  JU: 2022    Radha Jameson is a very pleasant 61 year old female with history of ulcerative pancolitis with J pouch and ileostomy takedown 8/10/21 who had some retained sutures at her stoma site that were removed in the OR 22 with Dr. Hung. I saw her last week with a hematoma at her surgical site. She presents today for bruising and worsening pain.    Interval history: Radha has had worsening bruising and a more firm area that she is concerned about. No fevers or chills but she again feels more fatigued and like her ears are ringing. Last time she felt like that her creatinine was elevated.    Physical Examination: Exam was chaperoned by JAVI Hampton   BP (!) 152/100 (BP Location: Left arm, Patient Position: Sitting, Cuff Size: Adult Large)   Pulse 61   Ht 5' 9\"   LMP 2009   SpO2 99%   BMI 22.89 kg/m     General: alert, oriented, in no acute distress, sitting comfortably  HEENT: mucous membranes moist  Abdomen: Incision site in the right lower quadrant with Dermabond in place.  Significant ecchymosis and some induration. The incision site was probed with a Qtip with only old bloody drainage present. Gently wicked with 1/4 inch NuGauze.    Assessment/Plan:  61 year old female with history of ulcerative pancolitis with J pouch and ileostomy takedown 8/10/21 who had some retained sutures at her stoma site that were removed in the OR 22 with Dr. Hung.  Large hematoma at her surgical site. Only old blood present. Discussed with Dr. Hung. Recommended warm/cold compresses and holding ASA for 10 days. Will have her keep the incision opening by wicking with 1/4 inch NuGauze. Will check for COVID today given her fatigue and ears ringing and will see what her Cr is also. I would like to see her for a recheck next week. Patient's questions were answered to her stated satisfaction and she is in " agreement with this plan.     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     Ulcerative pancolitis with complication (H)      Umbilical hernia without obstruction and without gangrene        Surgical history:  Past Surgical History:   Procedure Laterality Date     BILATERAL OOPHORECTOMY       BREAST SURGERY      reconstruction      SECTION       COLECTOMY TOTAL       COLONOSCOPY       COLONOSCOPY N/A 2022    Procedure: exam under anesthesia, suture removal, pouchoscopy;  Surgeon: Uriel Hung MD;  [place] [facility] [place]     CV FEMORAL ANGIOGRAM       DAVINCI COLECTOMY N/A 6/10/2021    Procedure: ROBOT-ASSISTED  ileal pouch-anal anastomosis, diverting loop ileostomy;  Surgeon: Uriel Hung MD;  Location: [facility] OR     EGD      , 2017     EXAM UNDER ANESTHESIA REMOVE SUTURE / STAPLE N/A 2021    Procedure: EXAM UNDER ANESTHESIA, WITH SUTURE REMOVAL;  Surgeon: Yemi Del Rio MD;  [place] [facility] [place] [place]     GASTRIC FUNDOPLICATION  2017     HERNIORRHAPHY VENTRAL N/A 2021    Procedure: HERNIORRHAPHY, VENTRAL, OPEN;  Surgeon: Yemi Del Rio MD;  [place] [facility] [place] [place]     HIATAL HERNIA REPAIR  2017    LINX     INSERT STENT URETER N/A 6/10/2021    Procedure: INSERTION, STENT, URETER, PREOPERATIVE;  Surgeon: Joaquin Dixon MD;  Location: [facility] OR     OVARIAN CYST REMOVAL       PELVIC LAPAROSCOPY  1985     Removal of LINX       RLE Venogram/thrombolysis Right 05/15/2020     SIGMOIDOSCOPY FLEXIBLE N/A 6/10/2021     Procedure: SIGMOIDOSCOPY, FLEXIBLE;  Surgeon: Uirel Hung MD;  Location: [facility] OR     TAKEDOWN ILEOSTOMY N/A 8/10/2021    Procedure: CLOSURE, ILEOSTOMY;  Surgeon: Uriel Hung MD;  Location: [facility] OR       Problem list:    Patient Active Problem List    Diagnosis Date Noted     Suture granuloma, initial encounter 05/27/2022     Priority: Medium     Added automatically from request for surgery [id number]       Ventral hernia without obstruction or gangrene 10/06/2021     Priority: Medium     Added automatically from request for surgery [id number]       Chronic kidney disease, stage 3 (H) 08/18/2021     Priority: Medium     Follow-up examination after colorectal surgery 07/02/2021     Priority: Medium     Added automatically from request for surgery [id number]       Ulcerative pancolitis with complication (H) 04/28/2021     Priority: Medium     Added automatically from request for surgery [id number]         Medications:  Current Outpatient Medications   Medication Sig Dispense Refill     acetaminophen (TYLENOL) 325 MG tablet Take 3 tablets (975 mg) by mouth 4 times daily for 8 days Alternate with ibuprofen (ADVIL/MOTRIN), IF ordered. 100 tablet 0     aspirin (ASA) 325 MG EC tablet Take 325 mg by mouth every evening       augmented betamethasone dipropionate (DIPROLENE) 0.05 % external lotion Apply topically 2 times daily scalp       BIOTIN PO Take 1 tablet by mouth 2 times daily       busPIRone (BUSPAR) 5 MG tablet Take 10 mg by mouth 2 times daily       carvedilol (COREG) 25 MG tablet Take 12.5 mg by mouth 2 times daily (with meals)       dutasteride (AVODART) 0.5 MG capsule Take 0.5 mg by mouth 2 times daily        EPINEPHrine (ANY BX GENERIC EQUIV) 0.3 MG/0.3ML injection 2-pack once as needed for anaphylaxis        escitalopram (LEXAPRO) 20 MG tablet Take 20 mg by mouth every morning       ketoconazole (NIZORAL) 2 % external shampoo daily as needed        loperamide (IMODIUM A-D) 2 MG tablet Take 2 tablets  (4 mg) by mouth 3 times daily as needed for diarrhea 60 tablet 5     loperamide (IMODIUM A-D) 2 MG tablet Take 1 tablet before bedtime. Can increase to 2 tablets before bedtime (Patient taking differently: 2 times daily as needed Take 1 tablet before bedtime. Can increase to 2 tablets before bedtime) 60 tablet 3     NONFORMULARY Viviscal - for hair - supplement - 1 tablet twice daily       nortriptyline (PAMELOR) 25 MG capsule Take 25 mg by mouth At Bedtime        polyethylene glycol (GOLYTELY) 236 g suspension At 7:30 AM on the day of the exam:  Start drinking the Golytely jug. Drink one 8-ounce glass every 15 minutes until the jug is half empty.  Discard remainder of container. (Patient not taking: No sig reported) 4000 mL 0       Allergies:  Allergies   Allergen Reactions     Warfarin Other (See Comments)     Internal bleeding reaction.     Wasp Venom Protein Angioedema and Other (See Comments)     Morphine Itching     Itching in throat per pt     Amlodipine Other (See Comments)     Edema         Family history:  Family History   Problem Relation Age of Onset     Matson's esophagus Father      Diabetes Mother      Hypertension Mother      Multiple myeloma Mother      Ulcerative Colitis Mother      Anesthesia Reaction Mother         post op delirium     Cervical Cancer Sister      Diabetes Type 2  Brother      Asthma Maternal Grandmother      Breast Cancer Maternal Grandmother      Breast Cancer Paternal Grandmother      Clotting Disorder No family hx of      Bleeding Disorder No family hx of        Social history:  Social History     Tobacco Use     Smoking status: Never Smoker     Smokeless tobacco: Never Used   Substance Use Topics     Alcohol use: Yes     Comment: couple coctails most days     Marital status: .    Nursing Notes:   Zamzam Lizarraga, EMT  6/27/2022 11:40 AM  Signed  Chief Complaint   Patient presents with     RECHECK     Painful hematoma       Vitals:    06/27/22 1138   BP: (!) 152/100  "  BP Location: Left arm   Patient Position: Sitting   Cuff Size: Adult Large   Pulse: 61   SpO2: 99%   Height: 5' 9\"       Body mass index is 22.89 kg/m .                          Zamzam Lizarraga EMT         15 minutes spent on the date of the encounter doing chart review, history and exam, documentation and further activities as noted above.   This is a postop visit.    LEVI Rowe, NP-C  Colon and Rectal Surgery  [facility]    This note was created using speech recognition software and may contain unintended word substitutions.    "

## 2022-07-06 ENCOUNTER — OFFICE VISIT (OUTPATIENT)
Dept: SURGERY | Facility: CLINIC | Age: 62
End: 2022-07-06
Payer: COMMERCIAL

## 2022-07-06 VITALS
SYSTOLIC BLOOD PRESSURE: 142 MMHG | DIASTOLIC BLOOD PRESSURE: 100 MMHG | OXYGEN SATURATION: 100 % | HEART RATE: 66 BPM | HEIGHT: 69 IN | BODY MASS INDEX: 22.89 KG/M2

## 2022-07-06 DIAGNOSIS — Z09 FOLLOW-UP EXAMINATION AFTER COLORECTAL SURGERY: Primary | ICD-10-CM

## 2022-07-06 DIAGNOSIS — T14.8XXA HEMATOMA OF SKIN: ICD-10-CM

## 2022-07-06 PROCEDURE — 99212 OFFICE O/P EST SF 10 MIN: CPT | Performed by: NURSE PRACTITIONER

## 2022-07-06 ASSESSMENT — PAIN SCALES - GENERAL: PAINLEVEL: NO PAIN (0)

## 2022-07-06 NOTE — NURSING NOTE
"Chief Complaint   Patient presents with     Post-op Visit       Vitals:    07/06/22 1355   BP: (!) 142/100   BP Location: Left arm   Patient Position: Sitting   Cuff Size: Adult Regular   Pulse: 66   SpO2: 100%   Height: 5' 9\"       Body mass index is 22.89 kg/m .    Alondra Govea CMA    "

## 2022-07-06 NOTE — PROGRESS NOTES
"Colon and Rectal Surgery Postoperative Clinic Note    RE: Radha Jameson  : 1960  JU: 2022    Radha Jameson is a very pleasant 61 year old female with history of ulcerative pancolitis with J pouch and ileostomy takedown 8/10/21 who had some retained sutures at her stoma site that were removed in the OR 22 with Dr. Hung. I saw her postoperatively with a hematoma at her surgical site. She presents today for bruising and worsening pain.    Interval history: Radha reports that she is overall feeling much better.  She was put on antibiotics by gastroenterology and feels that these have helped significantly.  She is unable to pack the opening near her site and this is closed over.  She reports that the pain is getting much better.  The bruise seems to be resolving but she still has a hard area present.    Physical Examination:   BP (!) 142/100 (BP Location: Left arm, Patient Position: Sitting, Cuff Size: Adult Regular)   Pulse 66   Ht 5' 9\"   LMP 2009   SpO2 100%   BMI 22.89 kg/m     General: alert, oriented, in no acute distress, sitting comfortably  HEENT: mucous membranes moist  Abdomen: Incision site in the right lower quadrant with Dermabond in place.  Significant ecchymosis and some induration.     Assessment/Plan:  61 year old female with history of ulcerative pancolitis with J pouch and ileostomy takedown 8/10/21 who had some retained sutures at her stoma site that were removed in the OR 22 with Dr. Hung.  Large hematoma at her surgical site.  This is starting to resolve but does still have a firm hematoma present.  Discussed with her that this will likely take several weeks to reabsorb but asked her to notify the clinic for any worsening pain, if this gets larger, or if the bruising seems to get worse.  She has a history of a DVT and has been holding her aspirin due to the hematoma.  Okay to restart this now after 10 days but if her bruising gets worse or the lump " gets larger, I would like her to stop again to be seen in clinic.  I would like to see her in about 1 month for a recheck unless she has any worsening symptoms in the meantime. Patient's questions were answered to her stated satisfaction and she is in agreement with this plan.    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     Ulcerative pancolitis with complication (H)      Umbilical hernia without obstruction and without gangrene        Surgical history:  Past Surgical History:   Procedure Laterality Date     BILATERAL OOPHORECTOMY       BREAST SURGERY      reconstruction      SECTION       COLECTOMY TOTAL  2019     COLONOSCOPY       COLONOSCOPY N/A 2022    Procedure: exam under anesthesia, suture removal, pouchoscopy;  Surgeon: Uriel Hung MD;  [place] [facility] [place]     CV FEMORAL ANGIOGRAM       DAVINCI COLECTOMY N/A 6/10/2021    Procedure: ROBOT-ASSISTED  ileal pouch-anal anastomosis, diverting loop ileostomy;  Surgeon: Uriel Hung MD;  Location: [facility] OR     EGD      , 2017     EXAM UNDER ANESTHESIA REMOVE SUTURE / STAPLE N/A 2021    Procedure: EXAM UNDER ANESTHESIA, WITH SUTURE REMOVAL;  Surgeon: Yemi Del Rio MD;  Location: [facility] [place]     GASTRIC FUNDOPLICATION  2017     HERNIORRHAPHY VENTRAL N/A 2021    Procedure: HERNIORRHAPHY, VENTRAL, OPEN;  Surgeon: Yemi Del Rio MD;  Location: [facility] [place]     HIATAL HERNIA REPAIR  2017    LINX     INSERT STENT URETER N/A 6/10/2021    Procedure: INSERTION,  STENT, URETER, PREOPERATIVE;  Surgeon: Joaquin Dixon MD;  Location: [facility] OR     OVARIAN CYST REMOVAL  1988     PELVIC LAPAROSCOPY  1985     Removal of LINX       RLE Venogram/thrombolysis Right 05/15/2020     SIGMOIDOSCOPY FLEXIBLE N/A 6/10/2021    Procedure: SIGMOIDOSCOPY, FLEXIBLE;  Surgeon: Uriel Hung MD;  Location: [facility] OR     TAKEDOWN ILEOSTOMY N/A 8/10/2021    Procedure: CLOSURE, ILEOSTOMY;  Surgeon: Uriel Hung MD;  Location: [facility] OR       Problem list:    Patient Active Problem List    Diagnosis Date Noted     Suture granuloma, initial encounter 05/27/2022     Priority: Medium     Added automatically from request for surgery [id number]       Ventral hernia without obstruction or gangrene 10/06/2021     Priority: Medium     Added automatically from request for surgery [id number]       Chronic kidney disease, stage 3 (H) 08/18/2021     Priority: Medium     Follow-up examination after colorectal surgery 07/02/2021     Priority: Medium     Added automatically from request for surgery [id number]       Ulcerative pancolitis with complication (H) 04/28/2021     Priority: Medium     Added automatically from request for surgery [id number]         Medications:  Current Outpatient Medications   Medication Sig Dispense Refill     aspirin (ASA) 325 MG EC tablet Take 325 mg by mouth every evening       augmented betamethasone dipropionate (DIPROLENE) 0.05 % external lotion Apply topically 2 times daily scalp       BIOTIN PO Take 1 tablet by mouth 2 times daily       busPIRone (BUSPAR) 5 MG tablet Take 10 mg by mouth 2 times daily       carvedilol (COREG) 25 MG tablet Take 12.5 mg by mouth 2 times daily (with meals)       dutasteride (AVODART) 0.5 MG capsule Take 0.5 mg by mouth 2 times daily        EPINEPHrine (ANY BX GENERIC EQUIV) 0.3 MG/0.3ML injection 2-pack once as needed for anaphylaxis        escitalopram (LEXAPRO) 20 MG tablet Take 20 mg by mouth every morning       ketoconazole (NIZORAL) 2 %  external shampoo daily as needed        loperamide (IMODIUM A-D) 2 MG tablet Take 2 tablets (4 mg) by mouth 3 times daily as needed for diarrhea 60 tablet 5     loperamide (IMODIUM A-D) 2 MG tablet Take 1 tablet before bedtime. Can increase to 2 tablets before bedtime (Patient taking differently: 2 times daily as needed Take 1 tablet before bedtime. Can increase to 2 tablets before bedtime) 60 tablet 3     NONFORMULARY Viviscal - for hair - supplement - 1 tablet twice daily       nortriptyline (PAMELOR) 25 MG capsule Take 25 mg by mouth At Bedtime        polyethylene glycol (GOLYTELY) 236 g suspension At 7:30 AM on the day of the exam:  Start drinking the Golytely jug. Drink one 8-ounce glass every 15 minutes until the jug is half empty.  Discard remainder of container. (Patient not taking: No sig reported) 4000 mL 0       Allergies:  Allergies   Allergen Reactions     Warfarin Other (See Comments)     Internal bleeding reaction.     Wasp Venom Protein Angioedema and Other (See Comments)     Morphine Itching     Itching in throat per pt     Amlodipine Other (See Comments)     Edema         Family history:  Family History   Problem Relation Age of Onset     Matson's esophagus Father      Diabetes Mother      Hypertension Mother      Multiple myeloma Mother      Ulcerative Colitis Mother      Anesthesia Reaction Mother         post op delirium     Cervical Cancer Sister      Diabetes Type 2  Brother      Asthma Maternal Grandmother      Breast Cancer Maternal Grandmother      Breast Cancer Paternal Grandmother      Clotting Disorder No family hx of      Bleeding Disorder No family hx of        Social history:  Social History     Tobacco Use     Smoking status: Never Smoker     Smokeless tobacco: Never Used   Substance Use Topics     Alcohol use: Yes     Comment: couple coctails most days     Marital status: .    Nursing Notes:   Alondra Govea  7/6/2022  1:57 PM  Signed  Chief Complaint   Patient presents  "with     Post-op Visit       Vitals:    07/06/22 1355   BP: (!) 142/100   BP Location: Left arm   Patient Position: Sitting   Cuff Size: Adult Regular   Pulse: 66   SpO2: 100%   Height: 5' 9\"       Body mass index is 22.89 kg/m .    Alondra Govea CMA         10 minutes spent on the date of the encounter doing chart review, history and exam, documentation and further activities as noted above.   This is a postop visit.    LEVI Rowe, NP-C  Colon and Rectal Surgery  [facility]    This note was created using speech recognition software and may contain unintended word substitutions.    "

## 2022-07-06 NOTE — LETTER
"2022       RE: Radha Jameson  [address]  [address]  [place] [address]     Dear Colleague,    Thank you for referring your patient, Radha Jameson, to the [facility] COLON AND RE[facility]AL SURGERY [facility]IN[facility] [place] [facility]. Please see a copy of my visit note below.    Colon and Rectal Surgery Postoperative Clinic Note    RE: Radha Jameson  : 1960  JU: 2022    Radha Jameson is a very pleasant 61 year old female with history of ulcerative pancolitis with J pouch and ileostomy takedown 8/10/21 who had some retained sutures at her stoma site that were removed in the OR 22 with Dr. Hung. I saw her postoperatively with a hematoma at her surgical site. She presents today for bruising and worsening pain.    Interval history: Radha reports that she is overall feeling much better.  She was put on antibiotics by gastroenterology and feels that these have helped significantly.  She is unable to pack the opening near her site and this is closed over.  She reports that the pain is getting much better.  The bruise seems to be resolving but she still has a hard area present.    Physical Examination:   BP (!) 142/100 (BP Location: Left arm, Patient Position: Sitting, Cuff Size: Adult Regular)   Pulse 66   Ht 5' 9\"   LMP 2009   SpO2 100%   BMI 22.89 kg/m     General: alert, oriented, in no acute distress, sitting comfortably  HEENT: mucous membranes moist  Abdomen: Incision site in the right lower quadrant with Dermabond in place.  Significant ecchymosis and some induration.     Assessment/Plan:  61 year old female with history of ulcerative pancolitis with J pouch and ileostomy takedown 8/10/21 who had some retained sutures at her stoma site that were removed in the OR 22 with Dr. Hung.  Large hematoma at her surgical site.  This is starting to resolve but does still have a firm hematoma present.  Discussed " with her that this will likely take several weeks to reabsorb but asked her to notify the clinic for any worsening pain, if this gets larger, or if the bruising seems to get worse.  She has a history of a DVT and has been holding her aspirin due to the hematoma.  Okay to restart this now after 10 days but if her bruising gets worse or the lump gets larger, I would like her to stop again to be seen in clinic.  I would like to see her in about 1 month for a recheck unless she has any worsening symptoms in the meantime. Patient's questions were answered to her stated satisfaction and she is in agreement with this plan.    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     Ulcerative pancolitis with complication (H)      Umbilical hernia without obstruction and without gangrene        Surgical history:  Past Surgical History:   Procedure Laterality Date     BILATERAL OOPHORECTOMY  2009     BREAST SURGERY  2003    reconstruction      SECTION       COLECTOMY TOTAL  2019     COLONOSCOPY       COLONOSCOPY N/A 2022    Procedure: exam under anesthesia, suture removal, pouchoscopy;  Surgeon: Uriel Hung MD;  Location: [facility] OR     CV FEMORAL ANGIOGRAM       DAVINCI COLECTOMY N/A 6/10/2021    Procedure: ROBOT-ASSISTED  ileal pouch-anal anastomosis, diverting loop ileostomy;  Surgeon: Uriel Hung MD;  Location: [facility] OR     EGD      ,      EXAM UNDER ANESTHESIA REMOVE SUTURE / STAPLE N/A 2021    Procedure: EXAM UNDER ANESTHESIA, WITH SUTURE  REMOVAL;  Surgeon: Yemi Del Rio MD;  Location: [facility] OR     GASTRIC FUNDOPLICATION  2017     HERNIORRHAPHY VENTRAL N/A 11/11/2021    Procedure: HERNIORRHAPHY, VENTRAL, OPEN;  Surgeon: Yemi Del Rio MD;  Location: [facility] OR     HIATAL HERNIA REPAIR  2017    LINX     INSERT STENT URETER N/A 6/10/2021    Procedure: INSERTION, STENT, URETER, PREOPERATIVE;  Surgeon: Joaquin Dixon MD;  Location: [facility] OR     OVARIAN CYST REMOVAL  1988     PELVIC LAPAROSCOPY  1985     Removal of LINX       RLE Venogram/thrombolysis Right 05/15/2020     SIGMOIDOSCOPY FLEXIBLE N/A 6/10/2021    Procedure: SIGMOIDOSCOPY, FLEXIBLE;  Surgeon: Uriel Hung MD;  Location: [facility] OR     TAKEDOWN ILEOSTOMY N/A 8/10/2021    Procedure: CLOSURE, ILEOSTOMY;  Surgeon: Uriel Hung MD;  Location: [facility] OR       Problem list:    Patient Active Problem List    Diagnosis Date Noted     Suture granuloma, initial encounter 05/27/2022     Priority: Medium     Added automatically from request for surgery [id number]       Ventral hernia without obstruction or gangrene 10/06/2021     Priority: Medium     Added automatically from request for surgery [id number]       Chronic kidney disease, stage 3 (H) 08/18/2021     Priority: Medium     Follow-up examination after colorectal surgery 07/02/2021     Priority: Medium     Added automatically from request for surgery [id number]       Ulcerative pancolitis with complication (H) 04/28/2021     Priority: Medium     Added automatically from request for surgery [id number]         Medications:  Current Outpatient Medications   Medication Sig Dispense Refill     aspirin (ASA) 325 MG EC tablet Take 325 mg by mouth every evening       augmented betamethasone dipropionate (DIPROLENE) 0.05 % external lotion Apply topically 2 times daily scalp       BIOTIN PO Take 1 tablet by mouth 2 times daily       busPIRone (BUSPAR) 5 MG tablet Take 10 mg by mouth 2 times daily       carvedilol (COREG) 25 MG tablet Take  12.5 mg by mouth 2 times daily (with meals)       dutasteride (AVODART) 0.5 MG capsule Take 0.5 mg by mouth 2 times daily        EPINEPHrine (ANY BX GENERIC EQUIV) 0.3 MG/0.3ML injection 2-pack once as needed for anaphylaxis        escitalopram (LEXAPRO) 20 MG tablet Take 20 mg by mouth every morning       ketoconazole (NIZORAL) 2 % external shampoo daily as needed        loperamide (IMODIUM A-D) 2 MG tablet Take 2 tablets (4 mg) by mouth 3 times daily as needed for diarrhea 60 tablet 5     loperamide (IMODIUM A-D) 2 MG tablet Take 1 tablet before bedtime. Can increase to 2 tablets before bedtime (Patient taking differently: 2 times daily as needed Take 1 tablet before bedtime. Can increase to 2 tablets before bedtime) 60 tablet 3     NONFORMULARY Viviscal - for hair - supplement - 1 tablet twice daily       nortriptyline (PAMELOR) 25 MG capsule Take 25 mg by mouth At Bedtime        polyethylene glycol (GOLYTELY) 236 g suspension At 7:30 AM on the day of the exam:  Start drinking the Golytely jug. Drink one 8-ounce glass every 15 minutes until the jug is half empty.  Discard remainder of container. (Patient not taking: No sig reported) 4000 mL 0       Allergies:  Allergies   Allergen Reactions     Warfarin Other (See Comments)     Internal bleeding reaction.     Wasp Venom Protein Angioedema and Other (See Comments)     Morphine Itching     Itching in throat per pt     Amlodipine Other (See Comments)     Edema         Family history:  Family History   Problem Relation Age of Onset     Matson's esophagus Father      Diabetes Mother      Hypertension Mother      Multiple myeloma Mother      Ulcerative Colitis Mother      Anesthesia Reaction Mother         post op delirium     Cervical Cancer Sister      Diabetes Type 2  Brother      Asthma Maternal Grandmother      Breast Cancer Maternal Grandmother      Breast Cancer Paternal Grandmother      Clotting Disorder No family hx of      Bleeding Disorder No family hx of   "      Social history:  Social History     Tobacco Use     Smoking status: Never Smoker     Smokeless tobacco: Never Used   Substance Use Topics     Alcohol use: Yes     Comment: couple coctails most days     Marital status: .    Nursing Notes:   Alondra Govea  7/6/2022  1:57 PM  Signed  Chief Complaint   Patient presents with     Post-op Visit       Vitals:    07/06/22 1355   BP: (!) 142/100   BP Location: Left arm   Patient Position: Sitting   Cuff Size: Adult Regular   Pulse: 66   SpO2: 100%   Height: 5' 9\"       Body mass index is 22.89 kg/m .    Alondra Govea CMA         10 minutes spent on the date of the encounter doing chart review, history and exam, documentation and further activities as noted above.   This is a postop visit.    LEVI Rowe, NP-C  Colon and Rectal Surgery  [facility]    This note was created using speech recognition software and may contain unintended word substitutions.  "

## 2024-05-20 NOTE — BRIEF OP NOTE
[facility]    Brief Operative Note    Pre-operative diagnosis: Ulcerative pancolitis with complication (H) [K51.019]  Follow-up examination after colorectal surgery [Z09]  Post-operative diagnosis Same as pre-operative diagnosis    Procedure: Procedure(s):  CLOSURE, ILEOSTOMY  Surgeon: Surgeon(s) and Role:     * Uriel Hung MD - Primary  Anesthesia: Combined General with Block   Estimated blood loss: Minimal  Drains: Subcutaneous penrose drain at ileostomy closure site  Specimens: * No specimens in log *  Findings:   None.  Complications: None.  Implants: * No implants in log *         Bilingual team member: Addie   Language: Yovani     Contacted patient parent via telephone, inform patient parent that patient need to complete the lab work.

## (undated) DEVICE — SU SILK 0 TIE 6X30" A306H

## (undated) DEVICE — PACK DAVINCI UROL

## (undated) DEVICE — PREP POVIDONE IODINE SOLUTION 10% 4OZ

## (undated) DEVICE — STPL 100 X 3.8MM GIA10038S

## (undated) DEVICE — Device

## (undated) DEVICE — DAVINCI HOT SHEARS TIP COVER  400180

## (undated) DEVICE — LINEN GOWN XLG 5407

## (undated) DEVICE — DAVINCI XI DRAPE ARM 470015

## (undated) DEVICE — BNDG ABDOMINAL BINDER 9X45-62" 79-89071

## (undated) DEVICE — SU MONOCRYL 4-0 PS-2 27" UND Y426H

## (undated) DEVICE — PREP CHLORAPREP 26ML TINTED ORANGE  260815

## (undated) DEVICE — KIT ENDO FIRST STEP DISINFECTANT 200ML W/POUCH EP-4

## (undated) DEVICE — LINEN TOWEL PACK X5 5464

## (undated) DEVICE — SYR 10ML LL W/O NDL 302995

## (undated) DEVICE — DAVINCI XI MONOPOLAR SCISSORS HOT SHEARS 8MM 470179

## (undated) DEVICE — CLIP ENDO HEMO-LOC GREEN MED/LG 544230

## (undated) DEVICE — SUCTION TIP POOLE K770

## (undated) DEVICE — DAVINCI SEAL CANNULA AND STPL 12MM 470380

## (undated) DEVICE — DRSG PRIMAPORE 02X3" 7133

## (undated) DEVICE — SYR 10ML PREFILLED 0.9% NACL INJ NOT STERILE 306547

## (undated) DEVICE — ESU PENCIL W/COATED BLADE E2450H

## (undated) DEVICE — ENDO ACCESS PLATFORM GELPOINT MINI CNGL3

## (undated) DEVICE — SU VICRYL 3-0 SH 8X18" UND J864D

## (undated) DEVICE — BARRIER SEPRAFILM 5X6" SINGLE SHEET 4301-02

## (undated) DEVICE — GLOVE PROTEXIS W/NEU-THERA 7.5  2D73TE75

## (undated) DEVICE — SU VICRYL 3-0 SH 27" UND J416H

## (undated) DEVICE — ENDO TROCAR CONMED AIRSEAL BLADELESS 08X120MM IAS8-120LP

## (undated) DEVICE — SU MONOCRYL 4-0 PS-2 18" UND Y496G

## (undated) DEVICE — DRAPE IOBAN INCISE 23X17" 6650EZ

## (undated) DEVICE — DRSG STERI STRIP 1/2X4" R1547

## (undated) DEVICE — PACK CYSTO UMMC CUSTOM

## (undated) DEVICE — SU VICRYL 0 TIE 54" J608H

## (undated) DEVICE — SUCTION TIP YANKAUER STR K87

## (undated) DEVICE — DRAPE LEGGINGS CLEAR 8430

## (undated) DEVICE — SPONGE RAY-TEC 4X8" 7318

## (undated) DEVICE — ESU GROUND PAD ADULT W/CORD E7507

## (undated) DEVICE — LINEN TOWEL PACK X6 WHITE 5487

## (undated) DEVICE — DAVINCI XI REDUCER 8-12MM 470381

## (undated) DEVICE — SUCTION IRR STRYKERFLOW II W/TIP 250-070-520

## (undated) DEVICE — SU PDS II 2-0 SH 27" Z317H

## (undated) DEVICE — STPL CIRCULAR ENDOPATH 25MM CVD ECS25A

## (undated) DEVICE — DAVINCI XI SEAL UNIVERSAL 5-8MM 470361

## (undated) DEVICE — SU SILK 2-0 TIE 12X30" A305H

## (undated) DEVICE — ESU PENCIL SMOKE EVAC W/ROCKER SWITCH 0703-047-000

## (undated) DEVICE — KIT PATIENT POSITIONING PIGAZZI LATEX FREE 40580

## (undated) DEVICE — SU MONOCRYL 4-0 RB-1 27" Y214H

## (undated) DEVICE — MARKER SKIN DOUBLE TIP W/FLEXI-RULER W/LABELS

## (undated) DEVICE — BLADE KNIFE SURG 10 371110

## (undated) DEVICE — RETR RING LONE STAR 14.1X14.1CM 3307G

## (undated) DEVICE — GUIDEWIRE SENSOR DUAL FLEX STR 0.035"X150CM M0066703080

## (undated) DEVICE — TUBING SUCTION 10'X3/16" N510

## (undated) DEVICE — SU ETHILON 2-0 FS 18" 664H

## (undated) DEVICE — DAVINCI XI DRAPE COLUMN 470341

## (undated) DEVICE — DRSG GAUZE 4X4" TRAY 6939

## (undated) DEVICE — DEVICE SUTURE PASSER 14GA WECK EFX EFXSP2

## (undated) DEVICE — ENDO SYSTEM WATER BOTTLE & TUBING W/CO2 FILTER 00711549

## (undated) DEVICE — DRAIN JACKSON PRATT RESERVOIR 100ML SU130-1305

## (undated) DEVICE — SU VICRYL 0 CT-1 27" UND J260H

## (undated) DEVICE — STPL RELOAD 100 X 3.8MM GIA10038L

## (undated) DEVICE — SUCTION MANIFOLD NEPTUNE 2 SYS 4 PORT 0702-020-000

## (undated) DEVICE — DAVINCI XI DRIVER NDL MEGA SUTURECUT 8MM EXT 471309

## (undated) DEVICE — TUBING CONMED AIRSEAL SMOKE EVAC INSUFFLATION ASM-EVAC

## (undated) DEVICE — DRAPE SHEET REV FOLD 3/4 9349

## (undated) DEVICE — DRSG GAUZE 4X4" 3033

## (undated) DEVICE — PACK RECTAL KIT CUSTOM ASC

## (undated) DEVICE — PAD CHUX UNDERPAD 30X30"

## (undated) DEVICE — ESU ENDO SCISSORS 5MM CVD 5DCS

## (undated) DEVICE — LINEN TOWEL PACK X30 5481

## (undated) DEVICE — SU VICRYL 2-0 TIE 54" J615H

## (undated) DEVICE — SYR 30ML SLIP TIP W/O NDL 302833

## (undated) DEVICE — KIT NEW IMAGE COLOSTOMY/ILEOSTOMY 2 3/4" LF 19354

## (undated) DEVICE — SUCTION MANIFOLD NEPTUNE 2 SYS 1 PORT 702-025-000

## (undated) DEVICE — PAD CHUX UNDERPAD 23X24" 7136

## (undated) DEVICE — ENDO CAP AND TUBING STERILE FOR ENDOGATOR  100130

## (undated) DEVICE — PACK MINOR CUSTOM ASC

## (undated) DEVICE — PACK GOWN 3/PK DISP XL SBA32GPFCB

## (undated) DEVICE — SU VICRYL 0 UR-6 27" J603H

## (undated) DEVICE — GLOVE PROTEXIS BLUE W/NEU-THERA 8.0  2D73EB80

## (undated) DEVICE — SU PROLENE 1 CTX 30" 8455H

## (undated) DEVICE — DRAPE LAP W/ARMBOARD 29410

## (undated) DEVICE — SU SILK 4-0 RB-1 30" K871H

## (undated) DEVICE — SYR PISTON URETHRAL 60ML 68000

## (undated) DEVICE — PROTECTOR ARM ONE-STEP TRENDELENBURG 40418

## (undated) DEVICE — SOL NACL 0.9% IRRIG 500ML BOTTLE 2F7123

## (undated) DEVICE — ENDO TROCAR BLUNT TIP KII BALLOON 12X100MM C0R47

## (undated) DEVICE — SOL WATER IRRIG 3000ML BAG 2B7117

## (undated) DEVICE — RETR ELASTIC STAYS LONE STAR SHARP 5MM 8/PACK 3311-8G

## (undated) DEVICE — WIPES FOLEY CARE SURESTEP PROVON DFC100

## (undated) DEVICE — SU PDS II 0 CT-2 27" Z334H

## (undated) DEVICE — SOL WATER IRRIG 1000ML BOTTLE 2F7114

## (undated) DEVICE — SU VICRYL 3-0 SH 27" J316H

## (undated) DEVICE — CATH TRAY FOLEY SURESTEP 16FR W/URNE MTR STLK LATEX A303316A

## (undated) DEVICE — DAVINCI XI ESU BIPOLAR 3MM ENDOWRIST FENESTRATED EXT 471205

## (undated) DEVICE — SU SILK 4-0 RB-1 CR 8X18" C054D

## (undated) DEVICE — SOL WATER IRRIG 500ML BOTTLE 2F7113

## (undated) DEVICE — SU PROLENE 2-0 SHDA 36" 8523H

## (undated) DEVICE — SU ETHIBOND 2-0 CT-2 30" X411H

## (undated) DEVICE — SOL ADH LIQUID BENZOIN SWAB 0.6ML C1544

## (undated) DEVICE — DRAPE MAYO STAND 23X54 8337

## (undated) DEVICE — GOWN IMPERVIOUS BREATHABLE SMART XLG 89045

## (undated) DEVICE — KIT CONNECTOR FOR OLYMPUS ENDOSCOPES DEFENDO 100310

## (undated) DEVICE — GLOVE PROTEXIS MICRO 7.5  2D73PM75

## (undated) DEVICE — SPONGE LAP 18X18" X8435

## (undated) DEVICE — CATH URETERAL OPEN END 5FRX70CM M0064002010

## (undated) DEVICE — SYSTEM CLEARIFY VISUALIZATION 21-345

## (undated) DEVICE — DEVICE SUTURE GRASPER TROCAR CLOSURE 14GA PMITCSG

## (undated) DEVICE — DRAIN JACKSON PRATT CHANNEL 19FR ROUND HUBLESS SIL JP-2230

## (undated) DEVICE — WIPE PREMOIST CLEANSING WASHCLOTHS 7988

## (undated) DEVICE — JELLY LUBRICATING SURGILUBE 2OZ TUBE

## (undated) DEVICE — SU SILK 3-0 TIE 12X30" A304H

## (undated) DEVICE — VESSEL LOOPS YELLOW MAXI 31145694

## (undated) DEVICE — SU VICRYL 2-0 SH 27" UND J417H

## (undated) RX ORDER — DEXAMETHASONE SODIUM PHOSPHATE 4 MG/ML
INJECTION, SOLUTION INTRA-ARTICULAR; INTRALESIONAL; INTRAMUSCULAR; INTRAVENOUS; SOFT TISSUE
Status: DISPENSED
Start: 2021-08-10

## (undated) RX ORDER — FENTANYL CITRATE 50 UG/ML
INJECTION, SOLUTION INTRAMUSCULAR; INTRAVENOUS
Status: DISPENSED
Start: 2021-06-10

## (undated) RX ORDER — FENTANYL CITRATE 50 UG/ML
INJECTION, SOLUTION INTRAMUSCULAR; INTRAVENOUS
Status: DISPENSED
Start: 2021-08-10

## (undated) RX ORDER — PROPOFOL 10 MG/ML
INJECTION, EMULSION INTRAVENOUS
Status: DISPENSED
Start: 2021-08-10

## (undated) RX ORDER — CEFAZOLIN SODIUM 1 G/3ML
INJECTION, POWDER, FOR SOLUTION INTRAMUSCULAR; INTRAVENOUS
Status: DISPENSED
Start: 2021-11-11

## (undated) RX ORDER — CEFAZOLIN SODIUM 1 G/3ML
INJECTION, POWDER, FOR SOLUTION INTRAMUSCULAR; INTRAVENOUS
Status: DISPENSED
Start: 2022-06-21

## (undated) RX ORDER — SCOLOPAMINE TRANSDERMAL SYSTEM 1 MG/1
PATCH, EXTENDED RELEASE TRANSDERMAL
Status: DISPENSED
Start: 2021-08-10

## (undated) RX ORDER — SCOLOPAMINE TRANSDERMAL SYSTEM 1 MG/1
PATCH, EXTENDED RELEASE TRANSDERMAL
Status: DISPENSED
Start: 2021-11-11

## (undated) RX ORDER — ERTAPENEM 1 G/1
INJECTION, POWDER, LYOPHILIZED, FOR SOLUTION INTRAMUSCULAR; INTRAVENOUS
Status: DISPENSED
Start: 2021-06-10

## (undated) RX ORDER — PROPOFOL 10 MG/ML
INJECTION, EMULSION INTRAVENOUS
Status: DISPENSED
Start: 2022-06-21

## (undated) RX ORDER — INDOCYANINE GREEN AND WATER 25 MG
KIT INJECTION
Status: DISPENSED
Start: 2021-06-10

## (undated) RX ORDER — GRANISETRON HYDROCHLORIDE 1 MG/ML
INJECTION INTRAVENOUS
Status: DISPENSED
Start: 2021-06-10

## (undated) RX ORDER — LIDOCAINE HYDROCHLORIDE 20 MG/ML
INJECTION, SOLUTION EPIDURAL; INFILTRATION; INTRACAUDAL; PERINEURAL
Status: DISPENSED
Start: 2021-08-10

## (undated) RX ORDER — EPHEDRINE SULFATE 50 MG/ML
INJECTION, SOLUTION INTRAMUSCULAR; INTRAVENOUS; SUBCUTANEOUS
Status: DISPENSED
Start: 2021-08-10

## (undated) RX ORDER — GABAPENTIN 100 MG/1
CAPSULE ORAL
Status: DISPENSED
Start: 2021-11-11

## (undated) RX ORDER — CELECOXIB 200 MG/1
CAPSULE ORAL
Status: DISPENSED
Start: 2021-06-10

## (undated) RX ORDER — METRONIDAZOLE 500 MG/100ML
INJECTION, SOLUTION INTRAVENOUS
Status: DISPENSED
Start: 2022-06-21

## (undated) RX ORDER — HYDROMORPHONE HYDROCHLORIDE 1 MG/ML
INJECTION, SOLUTION INTRAMUSCULAR; INTRAVENOUS; SUBCUTANEOUS
Status: DISPENSED
Start: 2021-06-10

## (undated) RX ORDER — BUPIVACAINE HYDROCHLORIDE 2.5 MG/ML
INJECTION, SOLUTION INFILTRATION; PERINEURAL
Status: DISPENSED
Start: 2022-06-21

## (undated) RX ORDER — FENTANYL CITRATE-0.9 % NACL/PF 10 MCG/ML
PLASTIC BAG, INJECTION (ML) INTRAVENOUS
Status: DISPENSED
Start: 2021-08-10

## (undated) RX ORDER — GRANISETRON HYDROCHLORIDE 1 MG/ML
INJECTION INTRAVENOUS
Status: DISPENSED
Start: 2021-08-10

## (undated) RX ORDER — FENTANYL CITRATE 50 UG/ML
INJECTION, SOLUTION INTRAMUSCULAR; INTRAVENOUS
Status: DISPENSED
Start: 2021-11-11

## (undated) RX ORDER — SODIUM CHLORIDE, SODIUM LACTATE, POTASSIUM CHLORIDE, CALCIUM CHLORIDE 600; 310; 30; 20 MG/100ML; MG/100ML; MG/100ML; MG/100ML
INJECTION, SOLUTION INTRAVENOUS
Status: DISPENSED
Start: 2021-06-10

## (undated) RX ORDER — ACETAMINOPHEN 325 MG/1
TABLET ORAL
Status: DISPENSED
Start: 2021-08-10

## (undated) RX ORDER — LIDOCAINE HCL/PF 100 MG/5ML
SYRINGE (ML) INJECTION
Status: DISPENSED
Start: 2022-06-21

## (undated) RX ORDER — MANNITOL 20 G/100ML
INJECTION, SOLUTION INTRAVENOUS
Status: DISPENSED
Start: 2021-08-10

## (undated) RX ORDER — BUPIVACAINE HYDROCHLORIDE 2.5 MG/ML
INJECTION, SOLUTION EPIDURAL; INFILTRATION; INTRACAUDAL
Status: DISPENSED
Start: 2021-06-10

## (undated) RX ORDER — BUPIVACAINE HYDROCHLORIDE 2.5 MG/ML
INJECTION, SOLUTION EPIDURAL; INFILTRATION; INTRACAUDAL
Status: DISPENSED
Start: 2021-11-11

## (undated) RX ORDER — ACETAMINOPHEN 325 MG/1
TABLET ORAL
Status: DISPENSED
Start: 2021-11-11

## (undated) RX ORDER — ACETAMINOPHEN 325 MG/1
TABLET ORAL
Status: DISPENSED
Start: 2022-06-21